# Patient Record
Sex: MALE | Race: AMERICAN INDIAN OR ALASKA NATIVE | NOT HISPANIC OR LATINO | Employment: UNEMPLOYED | URBAN - METROPOLITAN AREA
[De-identification: names, ages, dates, MRNs, and addresses within clinical notes are randomized per-mention and may not be internally consistent; named-entity substitution may affect disease eponyms.]

---

## 2017-06-22 ENCOUNTER — HOSPITAL ENCOUNTER (EMERGENCY)
Facility: CLINIC | Age: 52
Discharge: HOME | End: 2017-06-22
Attending: EMERGENCY MEDICINE | Admitting: EMERGENCY MEDICINE
Payer: COMMERCIAL

## 2017-06-22 VITALS
HEART RATE: 72 BPM | WEIGHT: 156 LBS | RESPIRATION RATE: 16 BRPM | DIASTOLIC BLOOD PRESSURE: 82 MMHG | BODY MASS INDEX: 23.64 KG/M2 | SYSTOLIC BLOOD PRESSURE: 132 MMHG | OXYGEN SATURATION: 97 % | TEMPERATURE: 98.2 F | HEIGHT: 68 IN

## 2017-06-22 DIAGNOSIS — F10.930 UNCOMPLICATED ALCOHOL WITHDRAWAL (H): ICD-10-CM

## 2017-06-22 DIAGNOSIS — R11.2 NAUSEA AND VOMITING, INTRACTABILITY OF VOMITING NOT SPECIFIED, UNSPECIFIED VOMITING TYPE: ICD-10-CM

## 2017-06-22 DIAGNOSIS — E87.6 HYPOKALEMIA: ICD-10-CM

## 2017-06-22 LAB
ALBUMIN SERPL-MCNC: 4 G/DL (ref 3.4–5)
ALCOHOL BREATH TEST: 0 (ref 0–0.01)
ALP SERPL-CCNC: 199 U/L (ref 40–150)
ALT SERPL W P-5'-P-CCNC: 108 U/L (ref 0–70)
ANION GAP SERPL CALCULATED.3IONS-SCNC: 11 MMOL/L (ref 3–14)
ANION GAP SERPL CALCULATED.3IONS-SCNC: 18 MMOL/L (ref 3–14)
AST SERPL W P-5'-P-CCNC: 214 U/L (ref 0–45)
BASOPHILS # BLD AUTO: 0.1 10E9/L (ref 0–0.2)
BASOPHILS NFR BLD AUTO: 1 %
BILIRUB SERPL-MCNC: 4.6 MG/DL (ref 0.2–1.3)
BUN SERPL-MCNC: 13 MG/DL (ref 7–30)
BUN SERPL-MCNC: 14 MG/DL (ref 7–30)
CALCIUM SERPL-MCNC: 8 MG/DL (ref 8.5–10.1)
CALCIUM SERPL-MCNC: 9 MG/DL (ref 8.5–10.1)
CHLORIDE SERPL-SCNC: 102 MMOL/L (ref 94–109)
CHLORIDE SERPL-SCNC: 96 MMOL/L (ref 94–109)
CO2 SERPL-SCNC: 26 MMOL/L (ref 20–32)
CO2 SERPL-SCNC: 29 MMOL/L (ref 20–32)
CREAT SERPL-MCNC: 0.6 MG/DL (ref 0.66–1.25)
CREAT SERPL-MCNC: 0.7 MG/DL (ref 0.66–1.25)
DIFFERENTIAL METHOD BLD: ABNORMAL
EOSINOPHIL # BLD AUTO: 0 10E9/L (ref 0–0.7)
EOSINOPHIL NFR BLD AUTO: 0.4 %
ERYTHROCYTE [DISTWIDTH] IN BLOOD BY AUTOMATED COUNT: 15.5 % (ref 10–15)
GFR SERPL CREATININE-BSD FRML MDRD: >90 ML/MIN/1.7M2
GFR SERPL CREATININE-BSD FRML MDRD: ABNORMAL ML/MIN/1.7M2
GLUCOSE SERPL-MCNC: 104 MG/DL (ref 70–99)
GLUCOSE SERPL-MCNC: 135 MG/DL (ref 70–99)
HCT VFR BLD AUTO: 43 % (ref 40–53)
HGB BLD-MCNC: 15.3 G/DL (ref 13.3–17.7)
IMM GRANULOCYTES # BLD: 0 10E9/L (ref 0–0.4)
IMM GRANULOCYTES NFR BLD: 0.2 %
LIPASE SERPL-CCNC: 315 U/L (ref 73–393)
LYMPHOCYTES # BLD AUTO: 0.6 10E9/L (ref 0.8–5.3)
LYMPHOCYTES NFR BLD AUTO: 11.1 %
MCH RBC QN AUTO: 32.6 PG (ref 26.5–33)
MCHC RBC AUTO-ENTMCNC: 35.6 G/DL (ref 31.5–36.5)
MCV RBC AUTO: 92 FL (ref 78–100)
MONOCYTES # BLD AUTO: 0.7 10E9/L (ref 0–1.3)
MONOCYTES NFR BLD AUTO: 14 %
NEUTROPHILS # BLD AUTO: 3.8 10E9/L (ref 1.6–8.3)
NEUTROPHILS NFR BLD AUTO: 73.3 %
NRBC # BLD AUTO: 0 10*3/UL
NRBC BLD AUTO-RTO: 0 /100
PLATELET # BLD AUTO: 102 10E9/L (ref 150–450)
POTASSIUM SERPL-SCNC: 2.6 MMOL/L (ref 3.4–5.3)
POTASSIUM SERPL-SCNC: 3.2 MMOL/L (ref 3.4–5.3)
PROT SERPL-MCNC: 9.1 G/DL (ref 6.8–8.8)
RBC # BLD AUTO: 4.69 10E12/L (ref 4.4–5.9)
SODIUM SERPL-SCNC: 140 MMOL/L (ref 133–144)
SODIUM SERPL-SCNC: 142 MMOL/L (ref 133–144)
WBC # BLD AUTO: 5.2 10E9/L (ref 4–11)

## 2017-06-22 PROCEDURE — 25000128 H RX IP 250 OP 636: Performed by: EMERGENCY MEDICINE

## 2017-06-22 PROCEDURE — 80048 BASIC METABOLIC PNL TOTAL CA: CPT | Performed by: EMERGENCY MEDICINE

## 2017-06-22 PROCEDURE — 36415 COLL VENOUS BLD VENIPUNCTURE: CPT | Performed by: EMERGENCY MEDICINE

## 2017-06-22 PROCEDURE — 96375 TX/PRO/DX INJ NEW DRUG ADDON: CPT | Performed by: EMERGENCY MEDICINE

## 2017-06-22 PROCEDURE — 96361 HYDRATE IV INFUSION ADD-ON: CPT | Performed by: EMERGENCY MEDICINE

## 2017-06-22 PROCEDURE — 80053 COMPREHEN METABOLIC PANEL: CPT | Performed by: EMERGENCY MEDICINE

## 2017-06-22 PROCEDURE — 82075 ASSAY OF BREATH ETHANOL: CPT | Performed by: EMERGENCY MEDICINE

## 2017-06-22 PROCEDURE — 25000132 ZZH RX MED GY IP 250 OP 250 PS 637: Performed by: EMERGENCY MEDICINE

## 2017-06-22 PROCEDURE — 99284 EMERGENCY DEPT VISIT MOD MDM: CPT | Mod: Z6 | Performed by: EMERGENCY MEDICINE

## 2017-06-22 PROCEDURE — 99284 EMERGENCY DEPT VISIT MOD MDM: CPT | Mod: 25 | Performed by: EMERGENCY MEDICINE

## 2017-06-22 PROCEDURE — 85025 COMPLETE CBC W/AUTO DIFF WBC: CPT | Performed by: EMERGENCY MEDICINE

## 2017-06-22 PROCEDURE — 83690 ASSAY OF LIPASE: CPT | Performed by: EMERGENCY MEDICINE

## 2017-06-22 PROCEDURE — 96365 THER/PROPH/DIAG IV INF INIT: CPT | Performed by: EMERGENCY MEDICINE

## 2017-06-22 RX ORDER — POTASSIUM CHLORIDE 7.45 MG/ML
10 INJECTION INTRAVENOUS CONTINUOUS
Status: DISCONTINUED | OUTPATIENT
Start: 2017-06-22 | End: 2017-06-22 | Stop reason: HOSPADM

## 2017-06-22 RX ORDER — DIAZEPAM 10 MG
10 TABLET ORAL ONCE
Status: COMPLETED | OUTPATIENT
Start: 2017-06-22 | End: 2017-06-22

## 2017-06-22 RX ORDER — POTASSIUM CHLORIDE 1.5 G/1.58G
40 POWDER, FOR SOLUTION ORAL ONCE
Status: COMPLETED | OUTPATIENT
Start: 2017-06-22 | End: 2017-06-22

## 2017-06-22 RX ORDER — ONDANSETRON 2 MG/ML
8 INJECTION INTRAMUSCULAR; INTRAVENOUS ONCE
Status: COMPLETED | OUTPATIENT
Start: 2017-06-22 | End: 2017-06-22

## 2017-06-22 RX ADMIN — DIAZEPAM 10 MG: 10 TABLET ORAL at 12:52

## 2017-06-22 RX ADMIN — SODIUM CHLORIDE 1000 ML: 9 INJECTION, SOLUTION INTRAVENOUS at 09:17

## 2017-06-22 RX ADMIN — POTASSIUM CHLORIDE 40 MEQ: 1.5 POWDER, FOR SOLUTION ORAL at 10:20

## 2017-06-22 RX ADMIN — ONDANSETRON 8 MG: 2 INJECTION INTRAMUSCULAR; INTRAVENOUS at 09:19

## 2017-06-22 RX ADMIN — SODIUM CHLORIDE 1000 ML: 9 INJECTION, SOLUTION INTRAVENOUS at 10:22

## 2017-06-22 RX ADMIN — DIAZEPAM 10 MG: 10 TABLET ORAL at 09:59

## 2017-06-22 RX ADMIN — POTASSIUM CHLORIDE 10 MEQ: 7.46 INJECTION, SOLUTION INTRAVENOUS at 10:33

## 2017-06-22 ASSESSMENT — ENCOUNTER SYMPTOMS
RESPIRATORY NEGATIVE: 1
FEVER: 0
NAUSEA: 1

## 2017-06-22 NOTE — ED AVS SNAPSHOT
[facility], Emergency Department    [address]    [place] [place] [place] [address]    Phone:  [phone]    Fax:  [phone]                                       Mario Lamar II   MRN: [id number]    Department:  [facility], Emergency Department   Date of Visit:  6/22/2017           Patient Information     Date Of Birth          1965        Your diagnoses for this visit were:     Nausea and vomiting, intractability of vomiting not specified, unspecified vomiting type     Hypokalemia        You were seen by Destiney Clement MD.      24 Hour Appointment Hotline       To make an appointment at any [facility] clinic, call [phone] ([phone]). If you don't have a family doctor or clinic, we will help you find one. [facility] clinics are conveniently located to serve the needs of you and your family.             Review of your medicines      Our records show that you are taking the medicines listed below. If these are incorrect, please call your family doctor or clinic.        Dose / Directions Last dose taken    clobetasol 0.05 % ointment   Commonly known as:  TEMOVATE   Quantity:  15 g        Apply topically 2 times daily as needed (Applied to patches on elbows and knees)   Refills:  0        FLUoxetine 20 MG capsule   Commonly known as:  PROzac   Dose:  20 mg   Quantity:  30 capsule        Take 1 capsule (20 mg) by mouth daily   Refills:  0        mirtazapine 7.5 MG Tabs tablet   Commonly known as:  REMERON   Dose:  7.5 mg   Quantity:  30 tablet        Take 1 tablet (7.5 mg) by mouth At Bedtime   Refills:  0                Procedures and tests performed during your visit     Alcohol breath test POCT    Basic metabolic panel    CBC with platelets differential    Comprehensive metabolic panel    Lipase      Orders Needing Specimen Collection     None      Pending Results     No orders found from 6/20/2017 to 6/23/2017.            Pending Culture Results     No orders found from  "2017 to 2017.            Pending Results Instructions     If you had any lab results that were not finalized at the time of your Discharge, you can call the ED Lab Result RN at [phone]. You will be contacted by this team for any positive Lab results or changes in treatment. The nurses are available 7 days a week from 10A to 6:30P.  You can leave a message 24 hours per day and they will return your call.        Thank you for choosing [facility]       Thank you for choosing [facility] for your care. Our goal is always to provide you with excellent care. Hearing back from our patients is one way we can continue to improve our services. Please take a few minutes to complete the written survey that you may receive in the mail after you visit with us. Thank you!        [facility] Information     [facility] lets you send messages to your doctor, view your test results, renew your prescriptions, schedule appointments and more. To sign up, go to [email or web address] . Click on \"Log in\" on the left side of the screen, which will take you to the Welcome page. Then click on \"Sign up Now\" on the right side of the page.     You will be asked to enter the access code listed below, as well as some personal information. Please follow the directions to create your username and password.     Your access code is: [email or web address]  Expires: 2017 12:48 PM     Your access code will  in 90 days. If you need help or a new code, please call your [facility] clinic or [phone].        Care EveryWhere ID     This is your Care EveryWhere ID. This could be used by other organizations to access your [facility] medical records  [id number]        Equal Access to Services     DEANN ROSENBAUM : Geri Bender, marko galvan, quentin hensley. So [facility] [phone].    ATENCIÓN: Si habla español, tiene a lo disposición servicios gratuitos de asistencia lingüística. Llame " al [phone].    We comply with applicable federal civil rights laws and [place] laws. We do not discriminate on the basis of race, color, national origin, age, disability sex, sexual orientation or gender identity.            After Visit Summary       This is your record. Keep this with you and show to your community pharmacist(s) and doctor(s) at your next visit.

## 2017-06-22 NOTE — ED NOTES
Patient placed on EKG monitor, pulse oximeter and blood pressure cuff for K of 2.6.  MD notified of low K+

## 2017-06-22 NOTE — ED PROVIDER NOTES
"  History     Chief Complaint   Patient presents with     Alcohol Problem     Drinks a quart to half gallon of vodka. Also drinks one mouthwash on Sunday. Last drink was at about 2200. Looking for detox. Dry heaving since yesterday and complains of pain in right lateral rib area and right upper quadrant.      HPI  Mario Lamar II is a 52-year-old male with a history of alcohol use who is here with concern for alcohol withdrawal, nausea, vomiting.  The patient states that he drinks regularly and has been doing so for many months now.  He describes for the last 2 days symptoms of epigastric discomfort nausea and vomiting.  He points to his liver and says that he has  liver pain .  He has had no fevers.  He denies any blood in his vomit. He is having symptoms of alcohol withdrawal including shakiness.  He denies a history of seizures.  No other recent illness.  No trauma.  No other complaints at this time.  This part of the document was transcribed by Silvio Wheat Medical Scribe.       I have reviewed the Medications, Allergies, Past Medical and Surgical History, and Social History in the Epic system.    Review of Systems   Constitutional: Negative for fever.   Respiratory: Negative.    Gastrointestinal: Positive for nausea.   All other systems reviewed and are negative.      Physical Exam   BP: (!) 130/93  Heart Rate: 91  Temp: 98.4  F (36.9  C)  Resp: 16  Height: 172.7 cm (5' 8\")  Weight: 70.8 kg (156 lb)  SpO2: 98 %  Physical Exam   Constitutional: He is oriented to person, place, and time. He appears well-developed and well-nourished.   HENT:   Head: Atraumatic.   Right Ear: External ear normal.   Left Ear: External ear normal.   Eyes: Conjunctivae and EOM are normal. No scleral icterus.   Neck: Normal range of motion. Neck supple.   Cardiovascular: Normal rate and regular rhythm.    Pulmonary/Chest: Effort normal. No respiratory distress.   Abdominal: Soft. There is no tenderness. There is no " rebound and no guarding.   Musculoskeletal: Normal range of motion. He exhibits no edema or tenderness.   Neurological: He is alert and oriented to person, place, and time.   Skin: Skin is warm and dry. No rash noted.   Psychiatric: He has a normal mood and affect. His behavior is normal.   Nursing note and vitals reviewed.      ED Course     ED Course     Procedures      Labs Ordered and Resulted from Time of ED Arrival Up to the Time of Departure from the ED   COMPREHENSIVE METABOLIC PANEL - Abnormal; Notable for the following:        Result Value    Potassium 2.6 (*)     Anion Gap 18 (*)     Glucose 104 (*)     Bilirubin Total 4.6 (*)     Protein Total 9.1 (*)     Alkaline Phosphatase 199 (*)      (*)      (*)     All other components within normal limits   CBC WITH PLATELETS DIFFERENTIAL - Abnormal; Notable for the following:     RDW 15.5 (*)     Platelet Count 102 (*)     Absolute Lymphocytes 0.6 (*)     All other components within normal limits   BASIC METABOLIC PANEL - Abnormal; Notable for the following:     Potassium 3.2 (*)     Glucose 135 (*)     Creatinine 0.60 (*)     Calcium 8.0 (*)     All other components within normal limits   ALCOHOL BREATH TEST POCT - Normal   LIPASE       Assessments & Plan (with Medical Decision Making)   52-year-old male here with nausea vomiting and alcohol withdrawal.  In the Emergency Department he is not tachycardic not hypertensive.  He does seem slightly tremulous though.  His belly exam is extremely benign with no tenderness elicited on palpation.  He was given symptomatic control with IV fluids, Zofran as well as a dose of oral Valium.  His labs were mostly notable for a chem panel with an elevated anion gap and a potassium of 2.6.  After the interventions previously described we rechecked the potassium and it is  now normal at 3.3.  The rest of his labs are unremarkable with exception of a mildly elevated ALT AST and bilirubin, suspect alcoholic  hepatitis.  He is feeling much improved against so I think it is probably reasonable for him to be transitioned over to detox.  I offered the patient detox at [address] but the patient declined.  He wanted to be admitted to [facility] but there is no chance for any bed today.  Again he declined detox and wants to go home instead which I certainly cannot force him to stay.  He can come back to the emergency room with any concern.  This part of the document was transcribed by Silvio Wheat, Medical Scribe.       I have reviewed the nursing notes.    I have reviewed the findings, diagnosis, plan and need for follow up with the patient.    Discharge Medication List as of 6/22/2017 12:48 PM          Final diagnoses:   Nausea and vomiting, intractability of vomiting not specified, unspecified vomiting type   Hypokalemia       6/22/2017   [facility], [place], EMERGENCY DEPARTMENT     Destiney Clement MD  06/22/17 6693

## 2017-06-22 NOTE — ED AVS SNAPSHOT
[facility], Emergency Department    [address]    [place] [place] [address]    Phone:  [phone]    Fax:  [phone]                                       Mario Lamar II   MRN: [id number]    Department:  [facility], Emergency Department   Date of Visit:  6/22/2017           After Visit Summary Signature Page     I have received my discharge instructions, and my questions have been answered. I have discussed any challenges I see with this plan with the nurse or doctor.    ..........................................................................................................................................  Patient/Patient Representative Signature      ..........................................................................................................................................  Patient Representative Print Name and Relationship to Patient    ..................................................               ................................................  Date                                            Time    ..........................................................................................................................................  Reviewed by Signature/Title    ...................................................              ..............................................  Date                                                            Time

## 2017-06-22 NOTE — DISCHARGE INSTRUCTIONS
Please make an appointment to [facility] with [facility] (phone: [phone] as soon as possible.

## 2017-07-14 ENCOUNTER — HOSPITAL ENCOUNTER (EMERGENCY)
Facility: CLINIC | Age: 52
Discharge: HOME | End: 2017-07-14
Attending: EMERGENCY MEDICINE | Admitting: EMERGENCY MEDICINE
Payer: COMMERCIAL

## 2017-07-14 VITALS
TEMPERATURE: 98 F | WEIGHT: 154 LBS | RESPIRATION RATE: 18 BRPM | DIASTOLIC BLOOD PRESSURE: 75 MMHG | SYSTOLIC BLOOD PRESSURE: 120 MMHG | HEIGHT: 68 IN | BODY MASS INDEX: 23.34 KG/M2 | OXYGEN SATURATION: 96 %

## 2017-07-14 DIAGNOSIS — F10.10 ALCOHOL ABUSE: ICD-10-CM

## 2017-07-14 DIAGNOSIS — F10.20 ACUTE ALCOHOLISM (H): ICD-10-CM

## 2017-07-14 LAB — ALCOHOL BREATH TEST: 0 (ref 0–0.01)

## 2017-07-14 PROCEDURE — 82075 ASSAY OF BREATH ETHANOL: CPT | Performed by: EMERGENCY MEDICINE

## 2017-07-14 PROCEDURE — 25000132 ZZH RX MED GY IP 250 OP 250 PS 637: Performed by: EMERGENCY MEDICINE

## 2017-07-14 PROCEDURE — 99283 EMERGENCY DEPT VISIT LOW MDM: CPT | Performed by: EMERGENCY MEDICINE

## 2017-07-14 PROCEDURE — 99282 EMERGENCY DEPT VISIT SF MDM: CPT | Mod: Z6 | Performed by: EMERGENCY MEDICINE

## 2017-07-14 RX ORDER — DIAZEPAM 5 MG
5 TABLET ORAL ONCE
Status: COMPLETED | OUTPATIENT
Start: 2017-07-14 | End: 2017-07-14

## 2017-07-14 RX ADMIN — DIAZEPAM 5 MG: 5 TABLET ORAL at 13:56

## 2017-07-14 ASSESSMENT — ENCOUNTER SYMPTOMS
NAUSEA: 1
ARTHRALGIAS: 0
HEADACHES: 0
COLOR CHANGE: 0
CHILLS: 1
CONFUSION: 0
NECK STIFFNESS: 0
DIAPHORESIS: 1
DIFFICULTY URINATING: 0
SHORTNESS OF BREATH: 0
FEVER: 0
EYE REDNESS: 0
ABDOMINAL PAIN: 0

## 2017-07-14 NOTE — ED AVS SNAPSHOT
[facility], Emergency Department    [address]    [place] [address]    Phone:  [phone]    Fax:  [phone]                                       Mario Lamar II   MRN: [id number]    Department:  [facility], Emergency Department   Date of Visit:  7/14/2017           Patient Information     Date Of Birth          1965        Your diagnoses for this visit were:     Alcohol abuse        You were seen by Gilberto Treviño MD.      Follow-up Information     Follow up with Elle Aguilar MD.    Specialty:  Family Practice    Contact information:     [facility]  [address] [facility] [address]  [phone]        24 Hour Appointment Hotline       To make an appointment at any [facility] clinic, call [phone] ([phone]). If you don't have a family doctor or clinic, we will help you find one. [facility] clinics are conveniently located to serve the needs of you and your family.             Review of your medicines      Our records show that you are taking the medicines listed below. If these are incorrect, please call your family doctor or clinic.        Dose / Directions Last dose taken    clobetasol 0.05 % ointment   Commonly known as:  TEMOVATE   Quantity:  15 g        Apply topically 2 times daily as needed (Applied to patches on elbows and knees)   Refills:  0        FLUoxetine 20 MG capsule   Commonly known as:  PROzac   Dose:  20 mg   Quantity:  30 capsule        Take 1 capsule (20 mg) by mouth daily   Refills:  0        mirtazapine 7.5 MG Tabs tablet   Commonly known as:  REMERON   Dose:  7.5 mg   Quantity:  30 tablet        Take 1 tablet (7.5 mg) by mouth At Bedtime   Refills:  0                Procedures and tests performed during your visit     Alcohol breath test POCT      Orders Needing Specimen Collection     None      Pending Results     No orders found from 7/12/2017 to 7/15/2017.            Pending Culture Results     No orders found  "from 2017 to 7/15/2017.            Pending Results Instructions     If you had any lab results that were not finalized at the time of your Discharge, you can call the ED Lab Result RN at [phone]. You will be contacted by this team for any positive Lab results or changes in treatment. The nurses are available 7 days a week from 10A to 6:30P.  You can leave a message 24 hours per day and they will return your call.        Thank you for choosing [facility]       Thank you for choosing [facility] for your care. Our goal is always to provide you with excellent care. Hearing back from our patients is one way we can continue to improve our services. Please take a few minutes to complete the written survey that you may receive in the mail after you visit with us. Thank you!        [employer] Information     [employer] lets you send messages to your doctor, view your test results, renew your prescriptions, schedule appointments and more. To sign up, go to [email or web address] . Click on \"Log in\" on the left side of the screen, which will take you to the Welcome page. Then click on \"Sign up Now\" on the right side of the page.     You will be asked to enter the access code listed below, as well as some personal information. Please follow the directions to create your username and password.     Your access code is: [email or web address]  Expires: 2017 12:48 PM     Your access code will  in 90 days. If you need help or a new code, please call your [facility] clinic or [phone].        Care EveryWhere ID     This is your Care EveryWhere ID. This could be used by other organizations to access your [facility] medical records  [id number]        Equal Access to Services     DEANN ROSENBAUM : Hadii lili Bender, marko aglvan, quentin hensley. So [facility] [phone].    ATENCIÓN: Si habla español, tiene a lo disposición servicios gratuitos de asistencia lingüística. " Alayna lang [phone].    We comply with applicable federal civil rights laws and [place] laws. We do not discriminate on the basis of race, color, national origin, age, disability sex, sexual orientation or gender identity.            After Visit Summary       This is your record. Keep this with you and show to your community pharmacist(s) and doctor(s) at your next visit.

## 2017-07-14 NOTE — ED PROVIDER NOTES
"  History     Chief Complaint   Patient presents with     Alcohol Problem     Pt has been drinking 1L vodka per day with last drink at approx midnight. Pt states he has had seizures with withdrawal. Pt believes last one was approx 3mths ago.     HPI  Mario Lamar II is a 52 year old male with a history of substance abuse who presents to the Emergency Department for evaluation of an alcohol problem. Patient complains of withdrawal symptoms beginning last night; including shakiness, chills, diaphoresis, nausea, and seeing \"lots of little things\" when he closes his eyes. He has been drinking everyday since the death of his wife in 2015. He drinks vodka. His last drink was yesterday evening around midnight, as far as he remembers since he notes that he \"blacked out\". He is seeking detox. He has been to detox before at [address]. He denies other drug use. He notes that his hand \"is all messed up\" and that he has screws and plates in his ankle. Patient is homeless, but occasionally stays with his sister.    Past Medical History:   Diagnosis Date     Substance abuse     alcohol abuse       Past Surgical History:   Procedure Laterality Date     ORTHOPEDIC SURGERY      Right ankle orthopedic surgery after fracture.       No family history on file.    Social History   Substance Use Topics     Smoking status: Current Every Day Smoker     Packs/day: 0.25     Smokeless tobacco: Former User     Alcohol use Yes      Comment: every day, 1 quart and a half/day of vodka       No current facility-administered medications for this encounter.      Current Outpatient Prescriptions   Medication     FLUoxetine (PROZAC) 20 MG capsule     mirtazapine (REMERON) 7.5 MG TABS tablet     clobetasol (TEMOVATE) 0.05 % ointment      No Known Allergies      I have reviewed the Medications, Allergies, Past Medical and Surgical History, and Social History in the Epic system.    Review of Systems   Constitutional: Positive for chills and " "diaphoresis. Negative for fever.   HENT: Negative for congestion.    Eyes: Positive for visual disturbance. Negative for redness.   Respiratory: Negative for shortness of breath.    Cardiovascular: Negative for chest pain.   Gastrointestinal: Positive for nausea. Negative for abdominal pain.   Genitourinary: Negative for difficulty urinating.   Musculoskeletal: Negative for arthralgias and neck stiffness.   Skin: Negative for color change.   Neurological: Negative for headaches.   Psychiatric/Behavioral: Negative for confusion.   All other systems reviewed and are negative.      Physical Exam   BP: 125/84  Heart Rate: 88  Temp: 98.3  F (36.8  C)  Resp: 18  Height: 172.7 cm (5' 8\")  Weight: 69.9 kg (154 lb)  SpO2: 95 %  Physical Exam   Constitutional: No distress.   HENT:   Head: Atraumatic.   Mouth/Throat: Oropharynx is clear and moist. No oropharyngeal exudate.   Eyes: Pupils are equal, round, and reactive to light. No scleral icterus.   Cardiovascular: Normal heart sounds and intact distal pulses.    Pulmonary/Chest: Breath sounds normal. No respiratory distress.   Abdominal: Soft. Bowel sounds are normal. There is no tenderness.   Musculoskeletal: He exhibits no edema or tenderness.   Neurological: He displays tremor.   Skin: Skin is warm. No rash noted. He is not diaphoretic.       ED Course   1:15 PM  The patient was seen and examined by Kevin Treviño MD in Room 7.     ED Course     Procedures               Labs Ordered and Resulted from Time of ED Arrival Up to the Time of Departure from the ED   ALCOHOL BREATH TEST POCT - Normal            Assessments & Plan (with Medical Decision Making)   52-year-old male with long-standing history of alcohol use and abuse who presents requesting detox.  He states that he is drinking 1 L of vodka per day.  However, his exam is largely unremarkable with normal vital signs and a mild tremor of his upper extremities, stretched hands.  Alcohol level was 0.  He had been provided " with 5 mg of Valium.  Unfortunately there were no detox beds available.  The patient will be discharged with instructions to follow-up with his primary physician.  In addition, he was given a list of available detox centers in the [facility].    I have reviewed the nursing notes.    I have reviewed the findings, diagnosis, plan and need for follow up with the patient.    New Prescriptions    No medications on file       Final diagnoses:   Alcohol abuse   IYuridia, am serving as a trained medical scribe to document services personally performed by Kevin Treviño MD, based on the provider's statements to me.      IKevin MD, was physically present and have reviewed and verified the accuracy of this note documented by Yuridia Espinoza.       7/14/2017   [facility], [place], EMERGENCY DEPARTMENT     Gilberto Treviño MD  07/14/17 4198

## 2017-07-14 NOTE — ED AVS SNAPSHOT
[facility], Emergency Department    [address]    [place] [place] [address]    Phone:  [phone]    Fax:  [phone]                                       Mario Lamar II   MRN: [id number]    Department:  [facility], Emergency Department   Date of Visit:  7/14/2017           After Visit Summary Signature Page     I have received my discharge instructions, and my questions have been answered. I have discussed any challenges I see with this plan with the nurse or doctor.    ..........................................................................................................................................  Patient/Patient Representative Signature      ..........................................................................................................................................  Patient Representative Print Name and Relationship to Patient    ..................................................               ................................................  Date                                            Time    ..........................................................................................................................................  Reviewed by Signature/Title    ...................................................              ..............................................  Date                                                            Time

## 2017-11-26 ENCOUNTER — HOSPITAL ENCOUNTER (INPATIENT)
Facility: CLINIC | Age: 52
LOS: 5 days | Discharge: HOME | DRG: 897 | End: 2017-12-01
Attending: EMERGENCY MEDICINE | Admitting: INTERNAL MEDICINE
Payer: COMMERCIAL

## 2017-11-26 DIAGNOSIS — E87.6 HYPOKALEMIA: ICD-10-CM

## 2017-11-26 DIAGNOSIS — F10.939 ALCOHOL WITHDRAWAL SYNDROME WITH COMPLICATION (H): ICD-10-CM

## 2017-11-26 DIAGNOSIS — F10.930 ALCOHOL WITHDRAWAL SYNDROME WITHOUT COMPLICATION (H): ICD-10-CM

## 2017-11-26 DIAGNOSIS — F32.A DEPRESSION WITH SUICIDAL IDEATION: Primary | ICD-10-CM

## 2017-11-26 DIAGNOSIS — E83.42 HYPOMAGNESEMIA: ICD-10-CM

## 2017-11-26 DIAGNOSIS — R45.851 DEPRESSION WITH SUICIDAL IDEATION: Primary | ICD-10-CM

## 2017-11-26 LAB
ABO + RH BLD: NORMAL
ABO + RH BLD: NORMAL
ALBUMIN SERPL-MCNC: 3.7 G/DL (ref 3.4–5)
ALBUMIN UR-MCNC: 30 MG/DL
ALP SERPL-CCNC: 167 U/L (ref 40–150)
ALT SERPL W P-5'-P-CCNC: 123 U/L (ref 0–70)
AMPHETAMINES UR QL SCN: NEGATIVE
ANION GAP SERPL CALCULATED.3IONS-SCNC: 20 MMOL/L (ref 3–14)
APPEARANCE UR: CLEAR
AST SERPL W P-5'-P-CCNC: 196 U/L (ref 0–45)
BACTERIA #/AREA URNS HPF: ABNORMAL /HPF
BARBITURATES UR QL: NEGATIVE
BASOPHILS # BLD AUTO: 0.1 10E9/L (ref 0–0.2)
BASOPHILS NFR BLD AUTO: 1.5 %
BENZODIAZ UR QL: POSITIVE
BILIRUB SERPL-MCNC: 2.5 MG/DL (ref 0.2–1.3)
BILIRUB UR QL STRIP: ABNORMAL
BLD GP AB SCN SERPL QL: NORMAL
BLOOD BANK CMNT PATIENT-IMP: NORMAL
BUN SERPL-MCNC: 13 MG/DL (ref 7–30)
CALCIUM SERPL-MCNC: 8.5 MG/DL (ref 8.5–10.1)
CANNABINOIDS UR QL SCN: NEGATIVE
CHLORIDE SERPL-SCNC: 99 MMOL/L (ref 94–109)
CO2 SERPL-SCNC: 20 MMOL/L (ref 20–32)
COCAINE UR QL: NEGATIVE
COLOR UR AUTO: ABNORMAL
CREAT SERPL-MCNC: 0.56 MG/DL (ref 0.66–1.25)
DIFFERENTIAL METHOD BLD: ABNORMAL
EOSINOPHIL # BLD AUTO: 0 10E9/L (ref 0–0.7)
EOSINOPHIL NFR BLD AUTO: 0.2 %
ERYTHROCYTE [DISTWIDTH] IN BLOOD BY AUTOMATED COUNT: 15.1 % (ref 10–15)
ETHANOL SERPL-MCNC: 0.01 G/DL
ETHANOL UR QL SCN: NEGATIVE
GFR SERPL CREATININE-BSD FRML MDRD: >90 ML/MIN/1.7M2
GLUCOSE SERPL-MCNC: 71 MG/DL (ref 70–99)
GLUCOSE UR STRIP-MCNC: 30 MG/DL
HCT VFR BLD AUTO: 40.3 % (ref 40–53)
HGB BLD-MCNC: 14 G/DL (ref 13.3–17.7)
HGB UR QL STRIP: ABNORMAL
IMM GRANULOCYTES # BLD: 0 10E9/L (ref 0–0.4)
IMM GRANULOCYTES NFR BLD: 0 %
INR PPP: 1.08 (ref 0.86–1.14)
KETONES UR STRIP-MCNC: 40 MG/DL
LEUKOCYTE ESTERASE UR QL STRIP: ABNORMAL
LIPASE SERPL-CCNC: 399 U/L (ref 73–393)
LYMPHOCYTES # BLD AUTO: 1 10E9/L (ref 0.8–5.3)
LYMPHOCYTES NFR BLD AUTO: 17.4 %
MAGNESIUM SERPL-MCNC: 1.2 MG/DL (ref 1.6–2.3)
MAGNESIUM SERPL-MCNC: 2.1 MG/DL (ref 1.6–2.3)
MCH RBC QN AUTO: 31.5 PG (ref 26.5–33)
MCHC RBC AUTO-ENTMCNC: 34.7 G/DL (ref 31.5–36.5)
MCV RBC AUTO: 91 FL (ref 78–100)
MONOCYTES # BLD AUTO: 0.7 10E9/L (ref 0–1.3)
MONOCYTES NFR BLD AUTO: 11.2 %
MUCOUS THREADS #/AREA URNS LPF: PRESENT /LPF
NEUTROPHILS # BLD AUTO: 4.2 10E9/L (ref 1.6–8.3)
NEUTROPHILS NFR BLD AUTO: 69.7 %
NITRATE UR QL: NEGATIVE
NRBC # BLD AUTO: 0 10*3/UL
NRBC BLD AUTO-RTO: 0 /100
OPIATES UR QL SCN: NEGATIVE
PH UR STRIP: 7 PH (ref 5–7)
PHOSPHATE SERPL-MCNC: 2.6 MG/DL (ref 2.5–4.5)
PLATELET # BLD AUTO: 124 10E9/L (ref 150–450)
POTASSIUM SERPL-SCNC: 3.3 MMOL/L (ref 3.4–5.3)
POTASSIUM SERPL-SCNC: 3.4 MMOL/L (ref 3.4–5.3)
PROT SERPL-MCNC: 8.7 G/DL (ref 6.8–8.8)
RBC # BLD AUTO: 4.45 10E12/L (ref 4.4–5.9)
RBC #/AREA URNS AUTO: >182 /HPF (ref 0–2)
SODIUM SERPL-SCNC: 139 MMOL/L (ref 133–144)
SOURCE: ABNORMAL
SP GR UR STRIP: 1.02 (ref 1–1.03)
SPECIMEN EXP DATE BLD: NORMAL
SQUAMOUS #/AREA URNS AUTO: <1 /HPF (ref 0–1)
TROPONIN I BLD-MCNC: 0 UG/L (ref 0–0.1)
TROPONIN I SERPL-MCNC: <0.015 UG/L (ref 0–0.04)
UROBILINOGEN UR STRIP-MCNC: >12 MG/DL (ref 0–2)
WBC # BLD AUTO: 6 10E9/L (ref 4–11)
WBC #/AREA URNS AUTO: 11 /HPF (ref 0–2)

## 2017-11-26 PROCEDURE — 85610 PROTHROMBIN TIME: CPT | Performed by: EMERGENCY MEDICINE

## 2017-11-26 PROCEDURE — 12000001 ZZH R&B MED SURG/OB UMMC

## 2017-11-26 PROCEDURE — 25000128 H RX IP 250 OP 636: Performed by: EMERGENCY MEDICINE

## 2017-11-26 PROCEDURE — 99284 EMERGENCY DEPT VISIT MOD MDM: CPT | Mod: Z6 | Performed by: EMERGENCY MEDICINE

## 2017-11-26 PROCEDURE — 86900 BLOOD TYPING SEROLOGIC ABO: CPT | Performed by: EMERGENCY MEDICINE

## 2017-11-26 PROCEDURE — 83735 ASSAY OF MAGNESIUM: CPT | Performed by: EMERGENCY MEDICINE

## 2017-11-26 PROCEDURE — 84100 ASSAY OF PHOSPHORUS: CPT | Performed by: EMERGENCY MEDICINE

## 2017-11-26 PROCEDURE — 83690 ASSAY OF LIPASE: CPT | Performed by: EMERGENCY MEDICINE

## 2017-11-26 PROCEDURE — 36415 COLL VENOUS BLD VENIPUNCTURE: CPT | Performed by: STUDENT IN AN ORGANIZED HEALTH CARE EDUCATION/TRAINING PROGRAM

## 2017-11-26 PROCEDURE — 86850 RBC ANTIBODY SCREEN: CPT | Performed by: EMERGENCY MEDICINE

## 2017-11-26 PROCEDURE — 86901 BLOOD TYPING SEROLOGIC RH(D): CPT | Performed by: EMERGENCY MEDICINE

## 2017-11-26 PROCEDURE — 83735 ASSAY OF MAGNESIUM: CPT | Performed by: STUDENT IN AN ORGANIZED HEALTH CARE EDUCATION/TRAINING PROGRAM

## 2017-11-26 PROCEDURE — 96375 TX/PRO/DX INJ NEW DRUG ADDON: CPT | Performed by: EMERGENCY MEDICINE

## 2017-11-26 PROCEDURE — 99285 EMERGENCY DEPT VISIT HI MDM: CPT | Mod: 25 | Performed by: EMERGENCY MEDICINE

## 2017-11-26 PROCEDURE — 80320 DRUG SCREEN QUANTALCOHOLS: CPT | Performed by: EMERGENCY MEDICINE

## 2017-11-26 PROCEDURE — 25000128 H RX IP 250 OP 636: Performed by: STUDENT IN AN ORGANIZED HEALTH CARE EDUCATION/TRAINING PROGRAM

## 2017-11-26 PROCEDURE — 96365 THER/PROPH/DIAG IV INF INIT: CPT | Performed by: EMERGENCY MEDICINE

## 2017-11-26 PROCEDURE — 87086 URINE CULTURE/COLONY COUNT: CPT | Performed by: INTERNAL MEDICINE

## 2017-11-26 PROCEDURE — 96376 TX/PRO/DX INJ SAME DRUG ADON: CPT | Performed by: EMERGENCY MEDICINE

## 2017-11-26 PROCEDURE — 96366 THER/PROPH/DIAG IV INF ADDON: CPT | Performed by: EMERGENCY MEDICINE

## 2017-11-26 PROCEDURE — 96367 TX/PROPH/DG ADDL SEQ IV INF: CPT | Performed by: EMERGENCY MEDICINE

## 2017-11-26 PROCEDURE — 80307 DRUG TEST PRSMV CHEM ANLYZR: CPT | Performed by: EMERGENCY MEDICINE

## 2017-11-26 PROCEDURE — 25000125 ZZHC RX 250: Performed by: EMERGENCY MEDICINE

## 2017-11-26 PROCEDURE — 84484 ASSAY OF TROPONIN QUANT: CPT

## 2017-11-26 PROCEDURE — 84484 ASSAY OF TROPONIN QUANT: CPT | Performed by: STUDENT IN AN ORGANIZED HEALTH CARE EDUCATION/TRAINING PROGRAM

## 2017-11-26 PROCEDURE — 84132 ASSAY OF SERUM POTASSIUM: CPT | Performed by: STUDENT IN AN ORGANIZED HEALTH CARE EDUCATION/TRAINING PROGRAM

## 2017-11-26 PROCEDURE — 81001 URINALYSIS AUTO W/SCOPE: CPT | Performed by: EMERGENCY MEDICINE

## 2017-11-26 PROCEDURE — 85025 COMPLETE CBC W/AUTO DIFF WBC: CPT | Performed by: EMERGENCY MEDICINE

## 2017-11-26 PROCEDURE — HZ2ZZZZ DETOXIFICATION SERVICES FOR SUBSTANCE ABUSE TREATMENT: ICD-10-PCS | Performed by: STUDENT IN AN ORGANIZED HEALTH CARE EDUCATION/TRAINING PROGRAM

## 2017-11-26 PROCEDURE — 25000132 ZZH RX MED GY IP 250 OP 250 PS 637: Performed by: EMERGENCY MEDICINE

## 2017-11-26 PROCEDURE — 93005 ELECTROCARDIOGRAM TRACING: CPT | Performed by: EMERGENCY MEDICINE

## 2017-11-26 PROCEDURE — 80053 COMPREHEN METABOLIC PANEL: CPT | Performed by: EMERGENCY MEDICINE

## 2017-11-26 PROCEDURE — 25000132 ZZH RX MED GY IP 250 OP 250 PS 637: Performed by: STUDENT IN AN ORGANIZED HEALTH CARE EDUCATION/TRAINING PROGRAM

## 2017-11-26 PROCEDURE — 96361 HYDRATE IV INFUSION ADD-ON: CPT | Performed by: EMERGENCY MEDICINE

## 2017-11-26 RX ORDER — PRAZOSIN HYDROCHLORIDE 2 MG/1
4 CAPSULE ORAL AT BEDTIME
Status: ON HOLD | COMMUNITY
End: 2017-12-01

## 2017-11-26 RX ORDER — MAGNESIUM SULFATE HEPTAHYDRATE 40 MG/ML
4 INJECTION, SOLUTION INTRAVENOUS EVERY 4 HOURS PRN
Status: DISCONTINUED | OUTPATIENT
Start: 2017-11-26 | End: 2017-12-01 | Stop reason: HOSPADM

## 2017-11-26 RX ORDER — NALOXONE HYDROCHLORIDE 0.4 MG/ML
.1-.4 INJECTION, SOLUTION INTRAMUSCULAR; INTRAVENOUS; SUBCUTANEOUS
Status: DISCONTINUED | OUTPATIENT
Start: 2017-11-26 | End: 2017-12-01 | Stop reason: HOSPADM

## 2017-11-26 RX ORDER — DIAZEPAM 10 MG
10 TABLET ORAL ONCE
Status: DISCONTINUED | OUTPATIENT
Start: 2017-11-26 | End: 2017-11-26

## 2017-11-26 RX ORDER — ONDANSETRON 2 MG/ML
4 INJECTION INTRAMUSCULAR; INTRAVENOUS ONCE
Status: COMPLETED | OUTPATIENT
Start: 2017-11-26 | End: 2017-11-26

## 2017-11-26 RX ORDER — FOLIC ACID 1 MG/1
1 TABLET ORAL DAILY
Status: DISCONTINUED | OUTPATIENT
Start: 2017-11-27 | End: 2017-12-01 | Stop reason: HOSPADM

## 2017-11-26 RX ORDER — POTASSIUM CHLORIDE 1.5 G/1.58G
20-40 POWDER, FOR SOLUTION ORAL
Status: DISCONTINUED | OUTPATIENT
Start: 2017-11-26 | End: 2017-12-01 | Stop reason: HOSPADM

## 2017-11-26 RX ORDER — POTASSIUM CHLORIDE 750 MG/1
20-40 TABLET, EXTENDED RELEASE ORAL
Status: DISCONTINUED | OUTPATIENT
Start: 2017-11-26 | End: 2017-12-01 | Stop reason: HOSPADM

## 2017-11-26 RX ORDER — BETAMETHASONE DIPROPIONATE 0.5 MG/G
OINTMENT, AUGMENTED TOPICAL 2 TIMES DAILY
Status: ON HOLD | COMMUNITY
End: 2017-12-01

## 2017-11-26 RX ORDER — RISPERIDONE 2 MG/1
2 TABLET ORAL AT BEDTIME
Status: ON HOLD | COMMUNITY
End: 2017-12-01

## 2017-11-26 RX ORDER — ONDANSETRON 4 MG/1
4 TABLET, ORALLY DISINTEGRATING ORAL EVERY 6 HOURS PRN
Status: DISCONTINUED | OUTPATIENT
Start: 2017-11-26 | End: 2017-12-01 | Stop reason: HOSPADM

## 2017-11-26 RX ORDER — POTASSIUM CL/LIDO/0.9 % NACL 10MEQ/0.1L
10 INTRAVENOUS SOLUTION, PIGGYBACK (ML) INTRAVENOUS ONCE
Status: COMPLETED | OUTPATIENT
Start: 2017-11-26 | End: 2017-11-26

## 2017-11-26 RX ORDER — HYDROXYZINE HYDROCHLORIDE 25 MG/1
25 TABLET, FILM COATED ORAL 3 TIMES DAILY
Status: ON HOLD | COMMUNITY
End: 2017-12-01

## 2017-11-26 RX ORDER — DIAZEPAM 10 MG/2ML
10 INJECTION, SOLUTION INTRAMUSCULAR; INTRAVENOUS ONCE
Status: COMPLETED | OUTPATIENT
Start: 2017-11-26 | End: 2017-11-26

## 2017-11-26 RX ORDER — POTASSIUM CL/LIDO/0.9 % NACL 10MEQ/0.1L
10 INTRAVENOUS SOLUTION, PIGGYBACK (ML) INTRAVENOUS
Status: DISCONTINUED | OUTPATIENT
Start: 2017-11-26 | End: 2017-12-01 | Stop reason: HOSPADM

## 2017-11-26 RX ORDER — ACETAMINOPHEN 325 MG/1
650 TABLET ORAL 3 TIMES DAILY PRN
COMMUNITY
End: 2018-01-31

## 2017-11-26 RX ORDER — MIRTAZAPINE 15 MG/1
15 TABLET, FILM COATED ORAL AT BEDTIME
Status: ON HOLD | COMMUNITY
End: 2017-12-01

## 2017-11-26 RX ORDER — DIAZEPAM 5 MG
5-20 TABLET ORAL EVERY 30 MIN PRN
Status: DISCONTINUED | OUTPATIENT
Start: 2017-11-26 | End: 2017-12-01 | Stop reason: HOSPADM

## 2017-11-26 RX ORDER — POTASSIUM CHLORIDE 7.45 MG/ML
10 INJECTION INTRAVENOUS
Status: DISCONTINUED | OUTPATIENT
Start: 2017-11-26 | End: 2017-12-01 | Stop reason: HOSPADM

## 2017-11-26 RX ORDER — ONDANSETRON 2 MG/ML
4 INJECTION INTRAMUSCULAR; INTRAVENOUS EVERY 6 HOURS PRN
Status: DISCONTINUED | OUTPATIENT
Start: 2017-11-26 | End: 2017-12-01 | Stop reason: HOSPADM

## 2017-11-26 RX ORDER — DIAZEPAM 10 MG
10 TABLET ORAL ONCE
Status: COMPLETED | OUTPATIENT
Start: 2017-11-26 | End: 2017-11-26

## 2017-11-26 RX ORDER — MULTIPLE VITAMINS W/ MINERALS TAB 9MG-400MCG
1 TAB ORAL DAILY
Status: DISCONTINUED | OUTPATIENT
Start: 2017-11-27 | End: 2017-12-01 | Stop reason: HOSPADM

## 2017-11-26 RX ORDER — POTASSIUM CHLORIDE 14.9 MG/ML
20 INJECTION INTRAVENOUS
Status: DISCONTINUED | OUTPATIENT
Start: 2017-11-26 | End: 2017-12-01 | Stop reason: HOSPADM

## 2017-11-26 RX ORDER — SENNOSIDES 8.6 MG
1 TABLET ORAL 2 TIMES DAILY PRN
Status: ON HOLD | COMMUNITY
End: 2017-12-01

## 2017-11-26 RX ORDER — FLUOXETINE 10 MG/1
30 CAPSULE ORAL DAILY
Status: ON HOLD | COMMUNITY
End: 2017-12-01

## 2017-11-26 RX ORDER — LANOLIN ALCOHOL/MO/W.PET/CERES
100 CREAM (GRAM) TOPICAL DAILY
Status: COMPLETED | OUTPATIENT
Start: 2017-11-27 | End: 2017-11-29

## 2017-11-26 RX ADMIN — POTASSIUM CHLORIDE 10 MEQ: 14.9 INJECTION, SOLUTION, CONCENTRATE PARENTERAL at 14:14

## 2017-11-26 RX ADMIN — ONDANSETRON 4 MG: 2 INJECTION INTRAMUSCULAR; INTRAVENOUS at 16:04

## 2017-11-26 RX ADMIN — SODIUM CHLORIDE 1000 ML: 9 INJECTION, SOLUTION INTRAVENOUS at 11:35

## 2017-11-26 RX ADMIN — DIAZEPAM 10 MG: 10 TABLET ORAL at 16:10

## 2017-11-26 RX ADMIN — ONDANSETRON 4 MG: 2 INJECTION INTRAMUSCULAR; INTRAVENOUS at 11:35

## 2017-11-26 RX ADMIN — DIAZEPAM 10 MG: 5 INJECTION, SOLUTION INTRAMUSCULAR; INTRAVENOUS at 11:35

## 2017-11-26 RX ADMIN — PANTOPRAZOLE SODIUM 40 MG: 40 INJECTION, POWDER, FOR SOLUTION INTRAVENOUS at 12:33

## 2017-11-26 RX ADMIN — SODIUM CHLORIDE 1000 ML: 9 INJECTION, SOLUTION INTRAVENOUS at 19:03

## 2017-11-26 RX ADMIN — POTASSIUM CHLORIDE 20 MEQ: 750 TABLET, FILM COATED, EXTENDED RELEASE ORAL at 19:03

## 2017-11-26 RX ADMIN — FOLIC ACID: 5 INJECTION, SOLUTION INTRAMUSCULAR; INTRAVENOUS; SUBCUTANEOUS at 13:00

## 2017-11-26 RX ADMIN — DIAZEPAM 10 MG: 5 TABLET ORAL at 19:45

## 2017-11-26 ASSESSMENT — ACTIVITIES OF DAILY LIVING (ADL)
TOILETING: 0-->INDEPENDENT
NUMBER_OF_TIMES_PATIENT_HAS_FALLEN_WITHIN_LAST_SIX_MONTHS: 5
RETIRED_EATING: 0-->INDEPENDENT
SWALLOWING: 0-->SWALLOWS FOODS/LIQUIDS WITHOUT DIFFICULTY
COGNITION: 0 - NO COGNITION ISSUES REPORTED
ADLS_ACUITY_SCORE: 13
BATHING: 0-->INDEPENDENT
DRESS: 0-->INDEPENDENT
AMBULATION: 0-->INDEPENDENT
TRANSFERRING: 0-->INDEPENDENT
FALL_HISTORY_WITHIN_LAST_SIX_MONTHS: YES
RETIRED_COMMUNICATION: 0-->UNDERSTANDS/COMMUNICATES WITHOUT DIFFICULTY

## 2017-11-26 ASSESSMENT — ENCOUNTER SYMPTOMS
ARTHRALGIAS: 0
HEADACHES: 0
SHORTNESS OF BREATH: 0
COLOR CHANGE: 0
EYE REDNESS: 0
NECK STIFFNESS: 0
FEVER: 0
DIFFICULTY URINATING: 0
DYSPHORIC MOOD: 1
BLOOD IN STOOL: 0
ABDOMINAL PAIN: 1
VOMITING: 1
NAUSEA: 1
CONFUSION: 0

## 2017-11-26 NOTE — IP AVS SNAPSHOT
[facility]    [address] [facility] [address] [place] [address]    Phone:  [phone]                                       After Visit Summary   11/26/2017    Mario Lamar II    MRN: [id number]           After Visit Summary Signature Page     I have received my discharge instructions, and my questions have been answered. I have discussed any challenges I see with this plan with the nurse or doctor.    ..........................................................................................................................................  Patient/Patient Representative Signature      ..........................................................................................................................................  Patient Representative Print Name and Relationship to Patient    ..................................................               ................................................  Date                                            Time    ..........................................................................................................................................  Reviewed by Signature/Title    ...................................................              ..............................................  Date                                                            Time

## 2017-11-26 NOTE — ED NOTES
RN went into room to check vitals before admission and he was having chest pain (330pm).  Admitting MD was at bedside and ordered EKG and Istat troponin which were normal.  MSSA was 14 and patient was given an additional 10mg of oral valium.  Pt was given 4mg IV Zofran for nausea.  Updated report given to Love ALCAZAR on 10a.

## 2017-11-26 NOTE — ED PROVIDER NOTES
"  History     Chief Complaint   Patient presents with     Withdrawal     shaky, nausea, dizzy, does not want detox     Abdominal Pain     right side abd pain \"since October\"     HPI  Mario Lamar II is a 52 year old male who presents to the emergency department complaining of alcohol withdrawal.  The patient states that he drinks alcohol daily.  He states that as of late, he's been drinking a half a gallon of vodka per day.  His last drink was yesterday evening.  This morning, the patient began feeling tremulous, nauseous, and began experiencing upper abdominal pain.  Patient reports a history of alcohol at all which is in included tremors, alcohol withdrawal seizures, and DTs.  The patient  states that he has been vomiting some yellow liquid this morning as well.  He states he's had upper abdominal pain for the past 1-2 months.  He denies any fever.  He denies any cough or dyspnea.  Patient states he did have one episode of loose stool today.  He denies melena.  He denies hematemesis.  He denies hematochezia.  The patient reports a history of depression.  He denies any suicide ideation.  He has not been compliant with his medications including his anti-depressants and antihypertensives.  He denies any recent fall or injury.  He does express desire to quit drinking.    I have reviewed the Medications, Allergies, Past Medical and Surgical History, and Social History in the Epic system.    Review of Systems   Constitutional: Negative for fever.   HENT: Negative for congestion.    Eyes: Negative for redness.   Respiratory: Negative for shortness of breath.    Cardiovascular: Negative for chest pain.   Gastrointestinal: Positive for abdominal pain, nausea and vomiting. Negative for blood in stool.   Genitourinary: Negative for difficulty urinating.   Musculoskeletal: Negative for arthralgias and neck stiffness.   Skin: Negative for color change.   Neurological: Negative for headaches. "   Psychiatric/Behavioral: Positive for dysphoric mood. Negative for confusion and suicidal ideas.   All other systems reviewed and are negative.      Physical Exam   BP: 141/81  Pulse: 107  Temp: 98  F (36.7  C)  Resp: 18  Weight: 69.6 kg (153 lb 8 oz)  SpO2: 98 %      Physical Exam   Constitutional: He appears well-developed and well-nourished. No distress.   HENT:   Head: Normocephalic and atraumatic.   Mouth/Throat: Oropharynx is clear and moist. No oropharyngeal exudate.   Tongue fasciculations   Eyes: Pupils are equal, round, and reactive to light. No scleral icterus.   Neck: Normal range of motion.   Cardiovascular: Normal heart sounds and intact distal pulses.  Tachycardia present.    Pulmonary/Chest: Effort normal and breath sounds normal. No respiratory distress.   Abdominal: Soft. Bowel sounds are normal. There is no tenderness.   Genitourinary: Rectal exam shows guaiac positive stool (brown stool).   Musculoskeletal: Normal range of motion. He exhibits no edema or tenderness.   Neurological: He is alert. He has normal strength. He displays tremor. Coordination normal.   Skin: Skin is warm. No rash noted. He is not diaphoretic.   Psychiatric: He has a normal mood and affect. His behavior is normal.   Nursing note and vitals reviewed.      ED Course     ED Course     Procedures            Critical Care time:         Results for orders placed or performed during the hospital encounter of 11/26/17 (from the past 24 hour(s))   CBC with platelets differential   Result Value Ref Range    WBC 6.0 4.0 - 11.0 10e9/L    RBC Count 4.45 4.4 - 5.9 10e12/L    Hemoglobin 14.0 13.3 - 17.7 g/dL    Hematocrit 40.3 40.0 - 53.0 %    MCV 91 78 - 100 fl    MCH 31.5 26.5 - 33.0 pg    MCHC 34.7 31.5 - 36.5 g/dL    RDW 15.1 (H) 10.0 - 15.0 %    Platelet Count 124 (L) 150 - 450 10e9/L    Diff Method Automated Method     % Neutrophils 69.7 %    % Lymphocytes 17.4 %    % Monocytes 11.2 %    % Eosinophils 0.2 %    % Basophils 1.5 %     % Immature Granulocytes 0.0 %    Nucleated RBCs 0 0 /100    Absolute Neutrophil 4.2 1.6 - 8.3 10e9/L    Absolute Lymphocytes 1.0 0.8 - 5.3 10e9/L    Absolute Monocytes 0.7 0.0 - 1.3 10e9/L    Absolute Eosinophils 0.0 0.0 - 0.7 10e9/L    Absolute Basophils 0.1 0.0 - 0.2 10e9/L    Abs Immature Granulocytes 0.0 0 - 0.4 10e9/L    Absolute Nucleated RBC 0.0    Comprehensive metabolic panel   Result Value Ref Range    Sodium 139 133 - 144 mmol/L    Potassium 3.3 (L) 3.4 - 5.3 mmol/L    Chloride 99 94 - 109 mmol/L    Carbon Dioxide 20 20 - 32 mmol/L    Anion Gap 20 (H) 3 - 14 mmol/L    Glucose 71 70 - 99 mg/dL    Urea Nitrogen 13 7 - 30 mg/dL    Creatinine 0.56 (L) 0.66 - 1.25 mg/dL    GFR Estimate >90 >60 mL/min/1.7m2    GFR Estimate If Black >90 >60 mL/min/1.7m2    Calcium 8.5 8.5 - 10.1 mg/dL    Bilirubin Total 2.5 (H) 0.2 - 1.3 mg/dL    Albumin 3.7 3.4 - 5.0 g/dL    Protein Total 8.7 6.8 - 8.8 g/dL    Alkaline Phosphatase 167 (H) 40 - 150 U/L     (H) 0 - 70 U/L     (H) 0 - 45 U/L   Lipase   Result Value Ref Range    Lipase 399 (H) 73 - 393 U/L   Magnesium   Result Value Ref Range    Magnesium 1.2 (L) 1.6 - 2.3 mg/dL   Alcohol level blood   Result Value Ref Range    Ethanol g/dL 0.01 (H) <0.01 g/dL   ABO/Rh type and screen   Result Value Ref Range    ABO A     RH(D) Pos     Antibody Screen Neg     Test Valid Only At          [facility]    Specimen Expires 11/29/2017    INR   Result Value Ref Range    INR 1.08 0.86 - 1.14      Medications   dextrose 5% and 0.9% NaCl 1,000 mL with INFUVITE 10 mL, thiamine 100 mg, folic acid 1 mg, magnesium sulfate 2 g infusion (not administered)   0.9% sodium chloride BOLUS (1,000 mLs Intravenous New Bag 11/26/17 1135)   ondansetron (ZOFRAN) injection 4 mg (4 mg Intravenous Given 11/26/17 1135)   diazepam (VALIUM) injection 10 mg (10 mg Intravenous Given 11/26/17 1135)   pantoprazole (PROTONIX) 40 mg IV push injection (40 mg  Intravenous Given 11/26/17 1233)         12:11 PM Feeling better.  Resting comfortably.  Minimal tremors.    12:51 PM Sleeping comfortably.  Awaken easily to voice.  Symptoms well controlled.    1:16 PM Tolerating ice chips without nausea. Abdomen- soft and non-tender.       Assessments & Plan (with Medical Decision Making)   52 year old male who presents to the emergency department in alcohol withdrawal.  He has been drinking alcohol daily for some time.  He is now expressing a desire to quit but began to experience alcohol withdrawal symptoms this morning with his last drink being yesterday evening.  The patient arrives tremulous with mild tachycardia.  He reports a history of withdrawal seizures and DTs.  The patient was treated with IV fluids, Zofran, and Valium.  His symptoms are well controlled.  The patient now denies any nausea and is able to tolerate ice chips.  His labs are remarkable for liver enzyme abnormality including his alk phos and transaminases consistent with previous results.  He has minimal elevation of his lipase.  The patient's magnesium level is low and he is also mildly hypokalemic.  He was given a banana bag with magnesium sulfate.  Potassium replacement also given.   The patient has brown, guaiac positive stool on exam but a normal hemoglobin and no history of hematemesis, melena or hematochezia.  Patient was given a single dose of IV Protonix as he likely has alcohol-related gastritis.  Do not suspect significant GI bleed.  The patient endorses some symptoms of depression but denies suicidal ideations.  There are no alcohol detox beds available here today.  The patient does have active alcohol withdrawal on presentation and will require admission for management of his alcohol withdrawal.  The patient will be admitted to the hospitalist service for further care.    I have reviewed the nursing notes.    I have reviewed the findings, diagnosis, plan and need for follow up with the  patient.    New Prescriptions    No medications on file       Final diagnoses:   Alcohol withdrawal syndrome without complication (H)   Hypomagnesemia   Hypokalemia       11/26/2017   [facility], EMERGENCY DEPARTMENT     Ventura Ba MD  11/26/17 1114

## 2017-11-26 NOTE — PHARMACY-ADMISSION MEDICATION HISTORY
Admission Medication History status for the 2017 admission is complete.  See EPIC admission navigator for Prior to Admission medications.    Medication history sources:  Patient, Care Everywhere ([facility])     Medication history source reliability: Poor- patient did not know any of his meds     Medication adherence:  Poor- patient has not taken any of his meds in over a month    Changes made to PTA medication list (reason)  Added:   -Hydroxyzine 25 mg tablets  -Acetaminophen 325 mg tablets  -Cholecalciferol 1000 unit tablets  -Fluoxetine 10 mg capsules  -Melatonin 1 mg tablets  -Milk of Magnesia 400 mg/5 mL suspension  -Mirtazapine 15 mg tablets  -Pantoprazole 40 mg  -Prazosin 2 mg capsules  -Risperidone 2 mg tablets  -Sennosides 806 mg tablets  -Therapeutic multivitamin tablets   -Augmented betamethasone dipropionate 0.05% ointment  Deleted:   -Clobetasol 0.05% ointment  -fluoxetine 20 mg capsules (presciption  on 17)  -mirtazapine 7.5 mg tabs (presciption  on 17)  Changed: None    Additional medication history information (including reliability of information, actions taken by pharmacist):   -Patient did not know the names of any of his medications.  -Patient states that he has not used any of his medications in over a month.  -Patient says that he should be on the medications he was on at [facility].  -All medications added to prior to admit list are from [facility] on Care Everywhere. All are from 2017.   -Called [facility] and the only med they had on patient's profile was acomprosate. It was never filled because insurance did not cover it.    Time spent in this activity: 45 minutes     Medication history completed by: MALAIKA Gee2 Pharmacy Intern    Prior to Admission medications    Medication Sig Last Dose Taking? Auth Provider   hydrOXYzine (ATARAX) 25 MG tablet Take 25 mg by mouth 3 times daily  More than  a month at Unknown time  Unknown, Entered By History   acetaminophen (TYLENOL) 325 MG tablet Take 650 mg by mouth 3 times daily as needed  More than a month at Unknown time  Unknown, Entered By History   cholecalciferol 1000 UNITS TABS Take 2,000 Units by mouth daily More than a month at Unknown time  Unknown, Entered By History   FLUoxetine (PROZAC) 10 MG capsule Take 30 mg by mouth daily More than a month at Unknown time  Unknown, Entered By History   melatonin 1 MG TABS tablet Take 1 mg by mouth nightly as needed for sleep More than a month at Unknown time  Unknown, Entered By History   magnesium hydroxide (MILK OF MAGNESIA) 400 MG/5ML suspension Take 30 mLs by mouth as needed for constipation More than a month at Unknown time  Unknown, Entered By History   mirtazapine (REMERON) 15 MG tablet Take 15 mg by mouth At Bedtime More than a month at Unknown time  Unknown, Entered By History   PANTOPRAZOLE SODIUM PO Take 40 mg by mouth every morning (before breakfast) More than a month at Unknown time  Unknown, Entered By History   prazosin (MINIPRESS) 2 MG capsule Take 4 mg by mouth At Bedtime More than a month at Unknown time  Unknown, Entered By History   risperiDONE (RISPERDAL) 2 MG tablet Take 2 mg by mouth At Bedtime More than a month at Unknown time  Unknown, Entered By History   sennosides (SENOKOT) 8.6 MG tablet Take 1 tablet by mouth 2 times daily as needed for constipation More than a month at Unknown time  Unknown, Entered By History   Multiple Vitamin (THERAPEUTIC MULTIVITAMIN PO) Take 1 tablet by mouth daily More than a month at Unknown time  Unknown, Entered By History   augmented betamethasone dipropionate (DIPROLENE-AF) 0.05 % ointment Apply topically 2 times daily More than a month at Unknown time  Unknown, Entered By History

## 2017-11-26 NOTE — IP AVS SNAPSHOT
MRN:[id number]                      After Visit Summary   11/26/2017    Mario Lamar II    MRN: [id number]           Thank you!     Thank you for choosing [facility] for your care. Our goal is always to provide you with excellent care. Hearing back from our patients is one way we can continue to improve our services. Please take a few minutes to complete the written survey that you may receive in the mail after you visit with us. Thank you!        Patient Information     Date Of Birth          1965        Designated Caregiver       Most Recent Value    Caregiver    Will someone help with your care after discharge? no      About your hospital stay     You were admitted on:  November 26, 2017 You last received care in the:  [facility] [address]    You were discharged on:  December 1, 2017        Reason for your hospital stay       Alcohol withdrawal                  Who to Call     For medical emergencies, please call 911.  For non-urgent questions about your medical care, please call your primary care provider or clinic, [phone]          Attending Provider     Provider Specialty    Ventura Ba MD Emergency Medicine    Mathew Robb MD Internal Medicine       Primary Care Provider Office Phone # Fax #    Elle Aguilar [phone]      After Care Instructions     Activity       Your activity upon discharge: activity as tolerated            Diet       Follow this diet upon discharge: Regular                  Follow-up Appointments     Adult [facility] Follow-up and recommended labs and tests       Follow up with primary care provider, Elle Aguilar, within 7 days for hospital follow- up.  No follow up labs or test are needed.      Appointments on [place] and/or [facility] campus (with [facility] or [facility] provider or service). Call [phone] if you haven't heard regarding these appointments within 7 days of discharge.                  Pending Results     No orders  "found from 2017 to 2017.            Statement of Approval     Ordered          17 0903  I have reviewed and agree with all the recommendations and orders detailed in this document.  EFFECTIVE NOW     Approved and electronically signed by:  Pedro Elena MD             Admission Information     Date & Time Department Dept. Phone    2017 [facility] [phone]      Your Vitals Were     Blood Pressure Pulse Temperature Respirations Height Weight    118/82 (BP Location: Right arm) 92 96.1  F (35.6  C) (Oral) 16 1.74 m (5' 8.5\") 68.7 kg (151 lb 6.4 oz)    Pulse Oximetry BMI (Body Mass Index)                98% 22.69 kg/m2          [employer] Information     [employer] lets you send messages to your doctor, view your test results, renew your prescriptions, schedule appointments and more. To sign up, go to [email or web address] . Click on \"Log in\" on the left side of the screen, which will take you to the Welcome page. Then click on \"Sign up Now\" on the right side of the page.     You will be asked to enter the access code listed below, as well as some personal information. Please follow the directions to create your username and password.     Your access code is: [email or web address]  Expires: 3/1/2018  9:03 AM     Your access code will  in 90 days. If you need help or a new code, please call your [facility] clinic or [phone].        Care EveryWhere ID     This is your Care EveryWhere ID. This could be used by other organizations to access your [facility] medical records  [id number]        Equal Access to Services     [facility] : Geri Bender, marko galvan, quentin hensley. So [facility] [phone].    ATENCIÓN: Si habla español, tiene a lo disposición servicios gratuitos de asistencia lingüística. Llame al [phone].    We comply with applicable federal civil rights laws and [place] laws. We do not discriminate on the " basis of race, color, national origin, age, disability, sex, sexual orientation, or gender identity.               Review of your medicines      START taking        Dose / Directions    escitalopram 10 MG tablet   Commonly known as:  LEXAPRO   Used for:  Depression with suicidal ideation        Dose:  10 mg   Start taking on:  12/2/2017   Take 1 tablet (10 mg) by mouth daily   Quantity:  60 tablet   Refills:  0         CONTINUE these medicines which have NOT CHANGED        Dose / Directions    acetaminophen 325 MG tablet   Commonly known as:  TYLENOL        Dose:  650 mg   Take 650 mg by mouth 3 times daily as needed   Refills:  0       cholecalciferol 1000 UNITS Tabs        Dose:  2000 Units   Take 2,000 Units by mouth daily   Refills:  0       hydrOXYzine 25 MG tablet   Commonly known as:  ATARAX   Used for:  Alcohol withdrawal syndrome without complication (H)        Dose:  25 mg   Take 1 tablet (25 mg) by mouth 3 times daily   Quantity:  30 tablet   Refills:  0       melatonin 1 MG Tabs tablet        Dose:  1 mg   Take 1 mg by mouth nightly as needed for sleep   Refills:  0       mirtazapine 15 MG tablet   Commonly known as:  REMERON   Used for:  Depression with suicidal ideation        Dose:  15 mg   Take 1 tablet (15 mg) by mouth At Bedtime   Quantity:  90 tablet   Refills:  1       THERAPEUTIC MULTIVITAMIN PO        Dose:  1 tablet   Take 1 tablet by mouth daily   Refills:  0         STOP taking     augmented betamethasone dipropionate 0.05 % ointment   Commonly known as:  DIPROLENE-AF           FLUoxetine 10 MG capsule   Commonly known as:  PROzac           magnesium hydroxide 400 MG/5ML suspension   Commonly known as:  MILK OF MAGNESIA           PANTOPRAZOLE SODIUM PO           prazosin 2 MG capsule   Commonly known as:  MINIPRESS           risperiDONE 2 MG tablet   Commonly known as:  risperDAL           sennosides 8.6 MG tablet   Commonly known as:  SENOKOT                Where to get your medicines       These medications were sent to [facility] [place], [place] - [address]  [address] [facility] [address], [facility] [address]     Phone:  [phone]     escitalopram 10 MG tablet    hydrOXYzine 25 MG tablet    mirtazapine 15 MG tablet                Protect others around you: Learn how to safely use, store and throw away your medicines at www.disposemymeds.org.             Medication List: This is a list of all your medications and when to take them. Check marks below indicate your daily home schedule. Keep this list as a reference.      Medications           Morning Afternoon Evening Bedtime As Needed    acetaminophen 325 MG tablet   Commonly known as:  TYLENOL   Take 650 mg by mouth 3 times daily as needed                                cholecalciferol 1000 UNITS Tabs   Take 2,000 Units by mouth daily                                escitalopram 10 MG tablet   Commonly known as:  LEXAPRO   Take 1 tablet (10 mg) by mouth daily   Start taking on:  12/2/2017   Last time this was given:  10 mg on 12/1/2017  8:54 AM                                hydrOXYzine 25 MG tablet   Commonly known as:  ATARAX   Take 1 tablet (25 mg) by mouth 3 times daily                                melatonin 1 MG Tabs tablet   Take 1 mg by mouth nightly as needed for sleep                                mirtazapine 15 MG tablet   Commonly known as:  REMERON   Take 1 tablet (15 mg) by mouth At Bedtime   Last time this was given:  15 mg on 11/30/2017  9:33 PM                                THERAPEUTIC MULTIVITAMIN PO   Take 1 tablet by mouth daily

## 2017-11-26 NOTE — PROGRESS NOTES
"Notified moonlighter at 1758 in regards to black \"callus\" on right big toe and nickel-sized scabed wound on inner left ankle.  "

## 2017-11-26 NOTE — H&P
[facility] History and Physical    Mario Lamar II MRN# [id number]   Age: 52 year old YOB: 1965     Date of Admission:  11/26/2017    Primary care provider: Elle Aguilar          Assessment and Plan:   Mario Lamar II is 52 year old male with a history of major depressive disorder, PSTD, and alcohol use disorder who presents with diaphoresis, shakiness, dizziness, and emesis consistent with alcohol withdrawal.    Alcohol use disorder with alcohol withdrawal  Hypokalemia and hypomagnesemia  Presents with tremors, nausea, and emesis. Has history of withdrawal seizures and DT. Last alcoholic drink was last night. Ethanol 0.01 on admission. Hypokalemia and hypomagnesemia likely secondary to alcohol use.  -- MSSA protocol with Valium  -- Daily thiamine, folate, and multivitamin  -- PRN Zofran  -- 1 L NS at 100 cc/hr  -- K and Mg supplementation protocol  -- Chemical dependency consult placed    Concern for hematemesis and melena  Hgb on admission stable at 14. Uncertain whether patient had hematemesis. Rectal exam negative for melena. Differential for hematemesis includes gastritis, Lisa-Murcia tear in the setting of frequent episodes of emesis, ulcers, varices, and Dieulafoy lesion. No EGD/colonoscopy on file. Received IV protonix in ED.  -- Protonix 40 mg IV BID   -- Will continue to monitor for hematemesis and GIB/melena  -- Will trend Hgb  -- Will not start octreotide at this time since suspicion for variceal bleeding is low since cirrhosis was not seen on abdominal US at [facility] [place] in [place]  -- If no recurrent GIB, consider discontinuing IV BID and resuming PTA PO PPI    Hepatitis   Hepatic steatosis  Hepatic steatosis seen on abdominal US at [facility] [place] in 8/2017. Presents with elevated LFTs that are stable for previous levels.  -- Continue to monitor    Mildly elevated lipase  Lipase 399 on admission. No concern for pancreatitis at this time. Has history of pancreatitis. No  upper abdominal pain and lipase not >3x ULN. Lipase stable to level in 6/2017.  -- Continue to monitor    Chest discomfort  Likely musculoskeletal discomfort since chest discomfort was reproducible to palpation. EKG did not show any ischemic changes and initial troponin negative.  -- Repeat troponin at 2000 to rule out ACS    Depression  PTSD  Sleep difficulties with auditory hallucinations  Patient has not been taking his psychiatric medications for 1 month. Was previously on fluoxetine, atarax, melatonin, mirtazapine, prazosin, and risperidone.  -- Resumed PRN melatonin  -- Will continue to hold antipsychotics  -- Psychiatry consulted    Calluses on bilateral feet  -- Podiatry consulted      HENRYI: Regular diet  Ppx: SCDs  Code status: Full Code  Disposition: Admitted inpatient     Cecile Hanson MD, PhD  [facility]  [id number]          Chief Complaint:   Sweaty, shaky, dizzy, and vomiting     History is obtained from the patient and chart review.         History of Present Illness:   Mario Lamar II is 52 year old male with a history of major depressive disorder, PSTD, and alcohol use disorder who presents with diaphoresis, shakiness, dizziness, and emesis.    Notes feeling diaphoretic, shaky, chills, and dizziness this morning while at sister's house. Vomited ~30 times today with yellow emesis and thought that he may have had red specks in 6 episodes of emesis today. He has been drinking 1/2 gallon vodka daily since October 2017, last drink was last night. Per patient, has previously had DT and alcohol withdrawal seizures, last seizure 1 month ago. Was living in a sober house for 2 weeks in October. Per patient, was in an alcohol treatment program from Feb-May of this year in [place]. [place] recently [place] in [place] [facility]    Denied fevers. Had mid chest discomfort that started 1 hour prior to interview and resolved during interview without any interventions. EKG did  not show any ischemic changes and troponin negative. Denied SOB. Endorsed having lower abdominal pain earlier today but no current abdominal pain. No hematochezia. Endorsed having melena since October. Endorsed having diarrhea over the last 2 days with ~3 loose BM/day. No constipation, dysuria, hematuria, or rashes.    Has not taken any of his prescription medications for 1 month. Notes that he has not taken any of his medications because he has been drinking. Denied SI/HI.    In ED, received 1 L NS bolus, banana bag with magnesium sulfate, Valium, zofran, and protonix 40 mg IV once.           Past Medical History:   Past Medical History:   Diagnosis Date     Depression      PTSD (post-traumatic stress disorder)      Substance abuse     alcohol abuse             Past Surgical History:      Past Surgical History:   Procedure Laterality Date     ORTHOPEDIC SURGERY      Right ankle orthopedic surgery after fracture.             Social History:     Social History     Social History     Marital status:      Spouse name: N/A     Number of children: N/A     Years of education: N/A     Occupational History     Not on file.     Social History Main Topics     Smoking status: Former Smoker     Packs/day: 0.50     Years: 33.00     Quit date: 10/26/2017     Smokeless tobacco: Former User     Alcohol use Yes      Comment: 1/2 gal vodka every day     Drug use: No     Sexual activity: Yes     Partners: Female     Other Topics Concern     Not on file     Social History Narrative             Family History:     Family History   Problem Relation Age of Onset     Alcoholism Sister      Family history reviewed and updated in EPIC            Allergies:   No Known Allergies          Medications:     Was not taking any medications over the last month.    No current facility-administered medications for this encounter.      Current Outpatient Prescriptions   Medication Sig     hydrOXYzine (ATARAX) 25 MG tablet Take 25 mg by mouth 3  times daily      acetaminophen (TYLENOL) 325 MG tablet Take 650 mg by mouth 3 times daily as needed      cholecalciferol 1000 UNITS TABS Take 2,000 Units by mouth daily     FLUoxetine (PROZAC) 10 MG capsule Take 30 mg by mouth daily     melatonin 1 MG TABS tablet Take 1 mg by mouth nightly as needed for sleep     magnesium hydroxide (MILK OF MAGNESIA) 400 MG/5ML suspension Take 30 mLs by mouth as needed for constipation     mirtazapine (REMERON) 15 MG tablet Take 15 mg by mouth At Bedtime     PANTOPRAZOLE SODIUM PO Take 40 mg by mouth every morning (before breakfast)     prazosin (MINIPRESS) 2 MG capsule Take 4 mg by mouth At Bedtime     risperiDONE (RISPERDAL) 2 MG tablet Take 2 mg by mouth At Bedtime     sennosides (SENOKOT) 8.6 MG tablet Take 1 tablet by mouth 2 times daily as needed for constipation     Multiple Vitamin (THERAPEUTIC MULTIVITAMIN PO) Take 1 tablet by mouth daily     augmented betamethasone dipropionate (DIPROLENE-AF) 0.05 % ointment Apply topically 2 times daily             Review of Systems:   A 10 point comprehensive review of systems was performed and found to be negative except as described in this note           Physical Exam:   Vitals were reviewed  Temp: 98.7  F (37.1  C) Temp src: Oral BP: 130/84 Pulse: 94   Resp: 20 SpO2: 100 % O2 Device: None (Room air)      General: mildly diaphoretic, laying in bed  HEENT: NC/AT, PERRL, EOMI, bilateral conjunctival injection, MMM  Neck: no cervical lymphadenopathy  CV: tachycardic, RRR, normal S1 and S2, no murmurs, 2+ bilateral radial pulses. Mid-chest discomfort reproducible to palpation  Resp: CTAB, no w/r/r, on RA  Abd: soft, nondistended, mild TTP in RUQ, negative Corona's, no rebound, no guarding, bowel sounds present   : rectal exam showed no stool, normal sphincter tone, no external hemorrhoids  Extremities: no lower extremity edema. Bilateral hand tremors. Black calluses on bilateral feet on plantar and dorsal surfaces.  Neuro: alert and  oriented            Data:   All laboratory and imaging data in the past 24 hours reviewed   Recent Results (from the past 24 hour(s))   CBC with platelets differential    Collection Time: 11/26/17 11:29 AM   Result Value Ref Range    WBC 6.0 4.0 - 11.0 10e9/L    RBC Count 4.45 4.4 - 5.9 10e12/L    Hemoglobin 14.0 13.3 - 17.7 g/dL    Hematocrit 40.3 40.0 - 53.0 %    MCV 91 78 - 100 fl    MCH 31.5 26.5 - 33.0 pg    MCHC 34.7 31.5 - 36.5 g/dL    RDW 15.1 (H) 10.0 - 15.0 %    Platelet Count 124 (L) 150 - 450 10e9/L    Diff Method Automated Method     % Neutrophils 69.7 %    % Lymphocytes 17.4 %    % Monocytes 11.2 %    % Eosinophils 0.2 %    % Basophils 1.5 %    % Immature Granulocytes 0.0 %    Nucleated RBCs 0 0 /100    Absolute Neutrophil 4.2 1.6 - 8.3 10e9/L    Absolute Lymphocytes 1.0 0.8 - 5.3 10e9/L    Absolute Monocytes 0.7 0.0 - 1.3 10e9/L    Absolute Eosinophils 0.0 0.0 - 0.7 10e9/L    Absolute Basophils 0.1 0.0 - 0.2 10e9/L    Abs Immature Granulocytes 0.0 0 - 0.4 10e9/L    Absolute Nucleated RBC 0.0    Comprehensive metabolic panel    Collection Time: 11/26/17 11:29 AM   Result Value Ref Range    Sodium 139 133 - 144 mmol/L    Potassium 3.3 (L) 3.4 - 5.3 mmol/L    Chloride 99 94 - 109 mmol/L    Carbon Dioxide 20 20 - 32 mmol/L    Anion Gap 20 (H) 3 - 14 mmol/L    Glucose 71 70 - 99 mg/dL    Urea Nitrogen 13 7 - 30 mg/dL    Creatinine 0.56 (L) 0.66 - 1.25 mg/dL    GFR Estimate >90 >60 mL/min/1.7m2    GFR Estimate If Black >90 >60 mL/min/1.7m2    Calcium 8.5 8.5 - 10.1 mg/dL    Bilirubin Total 2.5 (H) 0.2 - 1.3 mg/dL    Albumin 3.7 3.4 - 5.0 g/dL    Protein Total 8.7 6.8 - 8.8 g/dL    Alkaline Phosphatase 167 (H) 40 - 150 U/L     (H) 0 - 70 U/L     (H) 0 - 45 U/L   Lipase    Collection Time: 11/26/17 11:29 AM   Result Value Ref Range    Lipase 399 (H) 73 - 393 U/L   Magnesium    Collection Time: 11/26/17 11:29 AM   Result Value Ref Range    Magnesium 1.2 (L) 1.6 - 2.3 mg/dL   Alcohol level blood     Collection Time: 11/26/17 11:29 AM   Result Value Ref Range    Ethanol g/dL 0.01 (H) <0.01 g/dL   ABO/Rh type and screen    Collection Time: 11/26/17 11:29 AM   Result Value Ref Range    ABO A     RH(D) Pos     Antibody Screen Neg     Test Valid Only At          [facility]    Specimen Expires 11/29/2017    INR    Collection Time: 11/26/17 11:29 AM   Result Value Ref Range    INR 1.08 0.86 - 1.14   Phosphorus    Collection Time: 11/26/17 11:29 AM   Result Value Ref Range    Phosphorus 2.6 2.5 - 4.5 mg/dL   Troponin POCT    Collection Time: 11/26/17  3:41 PM   Result Value Ref Range    Troponin I 0.00 0.00 - 0.10 ug/L       EKG 11/26/17: NSR, no ST segment elevations or T wave inversions.

## 2017-11-27 LAB
ALBUMIN SERPL-MCNC: 3.1 G/DL (ref 3.4–5)
ALP SERPL-CCNC: 145 U/L (ref 40–150)
ALT SERPL W P-5'-P-CCNC: 96 U/L (ref 0–70)
ANION GAP SERPL CALCULATED.3IONS-SCNC: 10 MMOL/L (ref 3–14)
AST SERPL W P-5'-P-CCNC: 144 U/L (ref 0–45)
BACTERIA SPEC CULT: NORMAL
BACTERIA SPEC CULT: NORMAL
BILIRUB SERPL-MCNC: 2.7 MG/DL (ref 0.2–1.3)
BUN SERPL-MCNC: 9 MG/DL (ref 7–30)
CALCIUM SERPL-MCNC: 8.2 MG/DL (ref 8.5–10.1)
CHLORIDE SERPL-SCNC: 102 MMOL/L (ref 94–109)
CO2 SERPL-SCNC: 25 MMOL/L (ref 20–32)
CREAT SERPL-MCNC: 0.58 MG/DL (ref 0.66–1.25)
ERYTHROCYTE [DISTWIDTH] IN BLOOD BY AUTOMATED COUNT: 15 % (ref 10–15)
GFR SERPL CREATININE-BSD FRML MDRD: >90 ML/MIN/1.7M2
GLUCOSE SERPL-MCNC: 110 MG/DL (ref 70–99)
HCT VFR BLD AUTO: 40.3 % (ref 40–53)
HGB BLD-MCNC: 13.7 G/DL (ref 13.3–17.7)
INTERPRETATION ECG - MUSE: NORMAL
LIPASE SERPL-CCNC: 492 U/L (ref 73–393)
Lab: NORMAL
MAGNESIUM SERPL-MCNC: 1.9 MG/DL (ref 1.6–2.3)
MCH RBC QN AUTO: 31.2 PG (ref 26.5–33)
MCHC RBC AUTO-ENTMCNC: 34 G/DL (ref 31.5–36.5)
MCV RBC AUTO: 92 FL (ref 78–100)
PLATELET # BLD AUTO: 84 10E9/L (ref 150–450)
POTASSIUM SERPL-SCNC: 3.6 MMOL/L (ref 3.4–5.3)
PROT SERPL-MCNC: 7.6 G/DL (ref 6.8–8.8)
RBC # BLD AUTO: 4.39 10E12/L (ref 4.4–5.9)
SODIUM SERPL-SCNC: 137 MMOL/L (ref 133–144)
SPECIMEN SOURCE: NORMAL
WBC # BLD AUTO: 4.7 10E9/L (ref 4–11)

## 2017-11-27 PROCEDURE — 25000125 ZZHC RX 250: Performed by: STUDENT IN AN ORGANIZED HEALTH CARE EDUCATION/TRAINING PROGRAM

## 2017-11-27 PROCEDURE — 25000132 ZZH RX MED GY IP 250 OP 250 PS 637: Performed by: PODIATRIST

## 2017-11-27 PROCEDURE — 99223 1ST HOSP IP/OBS HIGH 75: CPT | Performed by: PSYCHIATRY & NEUROLOGY

## 2017-11-27 PROCEDURE — 25000132 ZZH RX MED GY IP 250 OP 250 PS 637: Performed by: INTERNAL MEDICINE

## 2017-11-27 PROCEDURE — 84132 ASSAY OF SERUM POTASSIUM: CPT | Performed by: INTERNAL MEDICINE

## 2017-11-27 PROCEDURE — 12000001 ZZH R&B MED SURG/OB UMMC

## 2017-11-27 PROCEDURE — 80053 COMPREHEN METABOLIC PANEL: CPT | Performed by: STUDENT IN AN ORGANIZED HEALTH CARE EDUCATION/TRAINING PROGRAM

## 2017-11-27 PROCEDURE — 25000132 ZZH RX MED GY IP 250 OP 250 PS 637: Performed by: STUDENT IN AN ORGANIZED HEALTH CARE EDUCATION/TRAINING PROGRAM

## 2017-11-27 PROCEDURE — 36415 COLL VENOUS BLD VENIPUNCTURE: CPT | Performed by: STUDENT IN AN ORGANIZED HEALTH CARE EDUCATION/TRAINING PROGRAM

## 2017-11-27 PROCEDURE — 99231 SBSQ HOSP IP/OBS SF/LOW 25: CPT | Performed by: INTERNAL MEDICINE

## 2017-11-27 PROCEDURE — 85027 COMPLETE CBC AUTOMATED: CPT | Performed by: STUDENT IN AN ORGANIZED HEALTH CARE EDUCATION/TRAINING PROGRAM

## 2017-11-27 PROCEDURE — 83735 ASSAY OF MAGNESIUM: CPT | Performed by: STUDENT IN AN ORGANIZED HEALTH CARE EDUCATION/TRAINING PROGRAM

## 2017-11-27 PROCEDURE — 83690 ASSAY OF LIPASE: CPT | Performed by: STUDENT IN AN ORGANIZED HEALTH CARE EDUCATION/TRAINING PROGRAM

## 2017-11-27 RX ORDER — PANTOPRAZOLE SODIUM 40 MG/1
40 TABLET, DELAYED RELEASE ORAL EVERY MORNING
Status: DISCONTINUED | OUTPATIENT
Start: 2017-11-27 | End: 2017-12-01 | Stop reason: HOSPADM

## 2017-11-27 RX ORDER — UREA 40 %
CREAM (GRAM) TOPICAL DAILY
Status: DISCONTINUED | OUTPATIENT
Start: 2017-11-27 | End: 2017-12-01 | Stop reason: HOSPADM

## 2017-11-27 RX ORDER — SODIUM CHLORIDE 9 MG/ML
INJECTION, SOLUTION INTRAVENOUS
Status: DISPENSED
Start: 2017-11-27 | End: 2017-11-27

## 2017-11-27 RX ORDER — ESCITALOPRAM OXALATE 5 MG/1
10 TABLET ORAL DAILY
Status: DISCONTINUED | OUTPATIENT
Start: 2017-11-27 | End: 2017-12-01 | Stop reason: HOSPADM

## 2017-11-27 RX ORDER — MIRTAZAPINE 15 MG/1
15 TABLET, FILM COATED ORAL AT BEDTIME
Status: DISCONTINUED | OUTPATIENT
Start: 2017-11-27 | End: 2017-12-01 | Stop reason: HOSPADM

## 2017-11-27 RX ADMIN — DIAZEPAM 10 MG: 5 TABLET ORAL at 16:54

## 2017-11-27 RX ADMIN — FOLIC ACID 1 MG: 1 TABLET ORAL at 09:29

## 2017-11-27 RX ADMIN — MIRTAZAPINE 15 MG: 15 TABLET, FILM COATED ORAL at 22:18

## 2017-11-27 RX ADMIN — DIAZEPAM 10 MG: 5 TABLET ORAL at 18:56

## 2017-11-27 RX ADMIN — ESCITALOPRAM OXALATE 10 MG: 5 TABLET, FILM COATED ORAL at 15:02

## 2017-11-27 RX ADMIN — POTASSIUM CHLORIDE 20 MEQ: 750 TABLET, FILM COATED, EXTENDED RELEASE ORAL at 09:29

## 2017-11-27 RX ADMIN — Medication 2 G: at 09:26

## 2017-11-27 RX ADMIN — DIAZEPAM 5 MG: 5 TABLET ORAL at 12:19

## 2017-11-27 RX ADMIN — DIAZEPAM 10 MG: 5 TABLET ORAL at 22:18

## 2017-11-27 RX ADMIN — PANTOPRAZOLE SODIUM 40 MG: 40 INJECTION, POWDER, FOR SOLUTION INTRAVENOUS at 08:05

## 2017-11-27 RX ADMIN — MULTIPLE VITAMINS W/ MINERALS TAB 1 TABLET: TAB at 09:29

## 2017-11-27 RX ADMIN — UREA: 400 CREAM TOPICAL at 15:05

## 2017-11-27 RX ADMIN — DIAZEPAM 5 MG: 5 TABLET ORAL at 08:13

## 2017-11-27 RX ADMIN — Medication 100 MG: at 09:29

## 2017-11-27 ASSESSMENT — ACTIVITIES OF DAILY LIVING (ADL)
ADLS_ACUITY_SCORE: 12
ADLS_ACUITY_SCORE: 13
ADLS_ACUITY_SCORE: 13
ADLS_ACUITY_SCORE: 12
ADLS_ACUITY_SCORE: 12
ADLS_ACUITY_SCORE: 13

## 2017-11-27 NOTE — PLAN OF CARE
Problem: Alcohol Withdrawal Acute, Risk/Actual (Adult)  Goal: Signs and Symptoms of Listed Potential Problems Will be Absent, Minimized or Managed (Alcohol Withdrawal Acute, Risk/Actual)  Signs and symptoms of listed potential problems will be absent, minimized or managed by discharge/transition of care (reference Alcohol Withdrawal Acute, Risk/Actual (Adult) CPG).           VS:       Pt A/O X 4. Afebrile. VSS. Lungs- clear bilaterally with both       anterior and posterior. IS encouraged. Denies nausea, shortness of breath, and chest pain. Pt is on MSSA, given Valium 5 mg x 2 for scores of 9 and 10 this shift.     Output:       Bowels- active in all four quadrants. Voids spontaneously without   difficulty in the bathroom.      Activity:       Pt up in room and to bathroom with assist of standby. Bed alarm is on and activated for safety. Side rails are padded.     Skin:   Blood blisters to toes on right foot are intact, callus intact to heel on left foot. Cream applied as ordered.     Pain:       Pt had abdominal pain this morning, improved this afternoon per pt report.     CMS:       CMS and Neuro's are intact. Denies numbness and tingling in all extremities.      Dressing:       No dressings in place.     Diet:       Pt is on a regular diet and appetite was fair this shift. Pt ate half of lunch and then fell asleep per pt report. Pt also tolerated applesauce, vanilla pudding, and iza crackers.       LDA:       PIV is patent in the right arm and SL.      Equipment:       Bilateral heels are elevated off the bed.     Plan:       Pt is able to make needs known and the call light is within the pt's reach. Bed alarm is on and activated for patient safety. Continue to monitor.       Additional Info:

## 2017-11-27 NOTE — CONSULTS
Psychiatric consultation note      Patient name: Mario Lamar II   MRN: [id number]    Age: 52 year old     YOB: 1965    Reason for consultation:    Assist in management of restarting antipsychotic medications if indicated.    History of present illness:    The patient has a history of major depressive disorder and alcohol dependence. He was admitted to the medical unit for complications related to ongoing alcohol usage and withdrawal. The patient had been noncompliant with his psychotropic medications for over one month and consultation was requested to help assist in restarting the appropriate medications. The patient tells me that he recently maintained approximately one month of sobriety while residing in a sober home. During that time, his mood was reported as being euthymic and he felt to be in good health and good spirit. He attended AA meetings regularly and maintain contact with sober supports.  He recalled that one day, he received a phone call from his girlfriend who was also saying in a different sober home, informing him that she was moving out with a plan to relapse on alcohol. He tells me that this influenced him to also leave his group home which was followed by a relapse on alcohol the same day. Over the past one month he has been drinking nearly half a gallon of liquor on a daily basis. During the same time, he has not been taking psychotropic medications. His mood has progressively become more depressed, and while intoxicated with alcohol, he recalled experiencing suicidal thoughts. In a moment of discomfort from alcohol withdrawal, he presented to the emergency room to seek help.    Psychiatric Review of Systems:    -- Depressive episode:  His mood has been depressed, characterized as mild to moderate, accompanied with low-energy, low appetite, low concentration. He denied feeling helpless and hopeless. He reported suicidal thoughts only being present during moments of  "severe alcohol intoxication. He denied suicidal thoughts on examination. He denied homicidal thoughts.  -- Melony:  denies symptoms unrelated to alcohol usage  -- Psychosis:  denies symptoms  -- Anxiety: denies symptoms corresponding to [place]IL[place] or panic disorder  -- PTSD: denies symptoms  -- OCD: denies  symptoms  -- Eating disorder: denies symptoms    Psychiatric History:    He identified a history of major depressive disorder with numerous prior inpatient hospitalizations. He has attempted suicide several times, describing attempts to cut his wrist, attempted hangings, and standing on a bridge while contemplating jumping. He reported that all of his attempts at suicide had occurred while under the influence of alcohol. He has established outpatient mental health providers for medication management and therapy however has not been compliant with appointments over the past one month. He was most recently prescribed Prozac, Remeron, and risperidone.    Substance Use History:    History of alcohol addiction over 20+ years with prior admissions to detox and treatment. He identified loss of control over use, progressive use, tolerance, withdrawal, and various health-related consequences along with relational and functional consequences related to his use.    Medical History:  Defer to internal medicine notes     Social History:  Refer to the psychosocial assessment completed by our .     Family History:    The patient denied a family history of mental illnesses or suicide attempts    Medical review of systems: 10 systems were reviewed and positive for psychiatric symptoms as noted above otherwise negative    Physical Exam:    B/P: 123/78, T: 99.1, P: 88, R: 16  Estimated body mass index is 23 kg/(m^2) as calculated from the following:    Height as of this encounter: 1.74 m (5' 8.5\").    Weight as of this encounter: 69.6 kg (153 lb 8 oz).    The rest of the physical examination was reviewed in the emergency room " "note completed by the emergency room physician.      Mental status examination:  Appearance:  Alert, fair hygiene, no acute distress  Attitude:  Attempts to be cooperative  Eye Contact: Fair  Mood:   \"Sad\"  Affect: Mood congruent and blunted  Speech:  Normal rates, tone, latency, volume. Not pushed or pressured.  Psychomotor Behavior:  No psychomotor abnormalities noted  Thought Process: Linear and logical; not tangential or circumstantial or disorganized  Associations:  Logical; no loose associations Noted  Thought Content:  No obvious paranoia, delusions, ideas of reference, or grandiosity noted. Denies auditory or visual hallucinations. Denies suicidal Ideations. Denies homicidal ideations.  Insight:  Fair  Judgment:  Fair  Oriented to:  time, person, and place  Attention Span and Concentration:  Intact  Recent and Remote Memory: Intact based on interviewing and details provided  Language: Appropriate based on interviewing  Fund of Knowledge: Appropriate based on interviewing  Muscle Strength and Tone: Normal upon visual inspection  Gait and Station: Normal upon visual inspection            Diagnoses:    Major Depressive Disorder - recurrent, moderate  Alcohol Use Disorder, revere     Recommendations:  1. Given the potential for ongoing hepatic impairment, I do not recommend restarting prozac. Instead, start lexapro 10 mg daily to address depressive symptoms. Remeron would be safe to restart at 15mg qhs to augment his antidepressant and help improve his sleep and appetite. Continue MSSA protocol to minimize alcohol withdrawal symptoms.     2. Agree with obtaining a chemical health assessment to help gain resources for outpatient support to maintain sobriety. He is not accepting inpatient options for chemical addition treatment at this time despite my recommendation for this treatment mode. He is expecting admission to an apartment in a sober community on Nov 30. Our  can further confirm this and help " coordinate outpatient appointments with his . He has established outpatient mental health providers and may follow up with them. We will help facilitate appointments.     3. Contact me as needed with any questions or concerns: [phone]

## 2017-11-27 NOTE — PROGRESS NOTES
"Admitted last night due to withdrawal from alcohol. Drinks half a gallon of vodka daily   Hx withdrawal seizure   On exam ;  Alert , bit slow mentation   Tremor  Vital signs:  Temp: 97.6  F (36.4  C) Temp src: Oral BP: 114/76 Pulse: 67   Resp: 16 SpO2: 96 % O2 Device: None (Room air)   Height: 174 cm (5' 8.5\") Weight: 69.6 kg (153 lb 8 oz)  Estimated body mass index is 23 kg/(m^2) as calculated from the following:    Height as of this encounter: 1.74 m (5' 8.5\").    Weight as of this encounter: 69.6 kg (153 lb 8 oz).  Perr, eomi  Neck ; supple. No jvd . No cb  Chest ; clear bl  cvs ; RRR  Gi ; soft , non tender, bs positive  Ext ; no edema  Labs ; lipase 492 ( 399)  Hb 13.7  ALT 96    UA blood   UDS ; BDZ  A/P  Alcohol abuse and dependence   MSSA with valium   Thiamin   Folic acid  MVI  Declined treatment     Hypomag and hypo K ;  Replace    Questionable GIB ; hb stable. Rectal exam no melena. Out patient egd    Elevated lipase ; tolerating diet     Depression and PTSD and auditory hallucinations  ; not taking medications for a month .  Re-consult psych  Disposition Plan   Expected discharge in 1-2days to prior living arrangement once stable from withdrawal perspective , psych consultation      Entered: Casandra Renteria 11/27/2017, 10:22 AM       "

## 2017-11-27 NOTE — CONSULTS
Past Medical History:   Diagnosis Date     Depression      PTSD (post-traumatic stress disorder)      Substance abuse     alcohol abuse     Patient Active Problem List   Diagnosis     Depression with suicidal ideation     Depression     Alcohol withdrawal (H)     Past Surgical History:   Procedure Laterality Date     ORTHOPEDIC SURGERY      Right ankle orthopedic surgery after fracture.     Social History     Social History     Marital status:      Spouse name: N/A     Number of children: N/A     Years of education: N/A     Occupational History     Not on file.     Social History Main Topics     Smoking status: Former Smoker     Packs/day: 0.50     Years: 33.00     Quit date: 10/26/2017     Smokeless tobacco: Former User     Alcohol use Yes      Comment: 1/2 gal vodka every day     Drug use: No     Sexual activity: Yes     Partners: Female     Other Topics Concern     Not on file     Social History Narrative     Family History   Problem Relation Age of Onset     Alcoholism Sister      Lab Results   Component Value Date    WBC 4.7 11/27/2017     Lab Results   Component Value Date    RBC 4.39 11/27/2017     Lab Results   Component Value Date    HGB 13.7 11/27/2017     Lab Results   Component Value Date    HCT 40.3 11/27/2017     No components found for: MCT  Lab Results   Component Value Date    MCV 92 11/27/2017     Lab Results   Component Value Date    MCH 31.2 11/27/2017     Lab Results   Component Value Date    MCHC 34.0 11/27/2017     Lab Results   Component Value Date    RDW 15.0 11/27/2017     Lab Results   Component Value Date    PLT 84 11/27/2017     Last Basic Metabolic Panel:  Lab Results   Component Value Date     11/27/2017      Lab Results   Component Value Date    POTASSIUM 3.6 11/27/2017     Lab Results   Component Value Date    CHLORIDE 102 11/27/2017     Lab Results   Component Value Date    JOHN 8.2 11/27/2017     Lab Results   Component Value Date    CO2 25 11/27/2017     Lab Results    Component Value Date    BUN 9 11/27/2017     Lab Results   Component Value Date    CR 0.58 11/27/2017     Lab Results   Component Value Date     11/27/2017     Note Dictated.

## 2017-11-27 NOTE — PLAN OF CARE
"Problem: Alcohol Withdrawal Acute, Risk/Actual (Adult)  Intervention: Minimize/Manage Withdrawal Symptoms    Pt is alert and oriented. CMS/neuros intact. Affect is flat and pt is withdrawn.   LS: crackles auscultated in LLL and RLL anteriorly. BS present and audible in all quadrants. Passing flatus. Had one episode of loose stool this evening.  Denies nausea, vomiting, headache, SOB, numbness/tingling in all extremities.  He c/o sharp pain in RUQ that is constant but tolerable for now. Reports he had a R rib fracture a couple of weeks ago but was not treated for it. Insists he \"toughed\" it out and it resolved.  MSSA = 14. 10mg of Valium administered per protocol. Pt also reports a hx of pancreatitis which he states is \"affecting my liver.\"  Denies auditory and visual hallucinations. He reports a recent suicide attempt in October, states he tried to jump in front of the lightrail. Denies any SI presently.  Currently homeless but reports he'll be getting an apartment in the next 3 weeks. Upon DC, he plans to go to his sober house to get his belongings before moving in.  Bed alarm activated for safety. Continue POC.        "

## 2017-11-27 NOTE — CONSULTS
SUBJECTIVE FINDINGS:  Mario Lamar is a 52-year-old male consulted with Podiatry by Cecile Hanson MD, for calluses.  The patient is seen at bedside resting.  He relates he is not diabetic.  He relates he did get some blistering.  He has been doing a lot of walking for a while and about the last month he noticed some calluses.  They do not really hurt.  He thought maybe he had too tight of shoes which he did change.  No other specific relieving or aggravating factors.  No specific injuries.      OBJECTIVE FINDINGS:  BP and PT are 2/4 bilaterally.  He has hyperkeratotic tissue build-up plantar medial right hallux, plantar right second and third MPJ, plantar right fifth MPJ, minimally on the plantar second and third MPJs on the left, posterior heel, left greater than right.  He has dorsal contracted digits bilaterally.  There is no erythema, no drainage, no odor, no calor bilaterally.  He has some dried blood blisters on the right hallux and second toe.  There are no gross focal lesions.      ASSESSMENT AND PLAN:  Tylomas bilaterally, blood blisters right first and second toes.  Diagnosis and treatment options discussed with the patient.  Prescription for Carmol lotion given and use discussed with him.  He will follow up with me as an outpatient as needed.         FARSHAD GARCIA DPM             D: 2017 12:16   T: 2017 12:32   MT: SK      Name:     MARIO LAMAR   MRN:      [id number]        Account:       [id number]   :      1965           Consult Date:  2017      Document: [id number]

## 2017-11-27 NOTE — CONSULTS
D) I reviewed patient's EMR for a LP screen. A) Pt has [facility] so will need a [facility] rule 25 for a tx referral. We will therefore not be seeing patient. P) Patient's CTC or  to arrange for rule 25 eval. Cricket Melendrez [facility], [facility]

## 2017-11-28 LAB
ALBUMIN SERPL-MCNC: 3.2 G/DL (ref 3.4–5)
ALP SERPL-CCNC: 160 U/L (ref 40–150)
ALT SERPL W P-5'-P-CCNC: 125 U/L (ref 0–70)
ANION GAP SERPL CALCULATED.3IONS-SCNC: 8 MMOL/L (ref 3–14)
AST SERPL W P-5'-P-CCNC: 218 U/L (ref 0–45)
BILIRUB DIRECT SERPL-MCNC: 0.7 MG/DL (ref 0–0.2)
BILIRUB SERPL-MCNC: 2 MG/DL (ref 0.2–1.3)
BUN SERPL-MCNC: 5 MG/DL (ref 7–30)
CALCIUM SERPL-MCNC: 8.4 MG/DL (ref 8.5–10.1)
CHLORIDE SERPL-SCNC: 100 MMOL/L (ref 94–109)
CO2 SERPL-SCNC: 25 MMOL/L (ref 20–32)
CREAT SERPL-MCNC: 0.63 MG/DL (ref 0.66–1.25)
ERYTHROCYTE [DISTWIDTH] IN BLOOD BY AUTOMATED COUNT: 14.6 % (ref 10–15)
GFR SERPL CREATININE-BSD FRML MDRD: >90 ML/MIN/1.7M2
GLUCOSE SERPL-MCNC: 91 MG/DL (ref 70–99)
HCT VFR BLD AUTO: 40.6 % (ref 40–53)
HGB BLD-MCNC: 14 G/DL (ref 13.3–17.7)
LIPASE SERPL-CCNC: 416 U/L (ref 73–393)
MAGNESIUM SERPL-MCNC: 1.8 MG/DL (ref 1.6–2.3)
MCH RBC QN AUTO: 31 PG (ref 26.5–33)
MCHC RBC AUTO-ENTMCNC: 34.5 G/DL (ref 31.5–36.5)
MCV RBC AUTO: 90 FL (ref 78–100)
PLATELET # BLD AUTO: 83 10E9/L (ref 150–450)
POTASSIUM SERPL-SCNC: 3.4 MMOL/L (ref 3.4–5.3)
PROT SERPL-MCNC: 7.7 G/DL (ref 6.8–8.8)
RBC # BLD AUTO: 4.51 10E12/L (ref 4.4–5.9)
SODIUM SERPL-SCNC: 133 MMOL/L (ref 133–144)
WBC # BLD AUTO: 5.8 10E9/L (ref 4–11)

## 2017-11-28 PROCEDURE — 83735 ASSAY OF MAGNESIUM: CPT | Performed by: INTERNAL MEDICINE

## 2017-11-28 PROCEDURE — 80048 BASIC METABOLIC PNL TOTAL CA: CPT | Performed by: INTERNAL MEDICINE

## 2017-11-28 PROCEDURE — 25000132 ZZH RX MED GY IP 250 OP 250 PS 637: Performed by: INTERNAL MEDICINE

## 2017-11-28 PROCEDURE — 80076 HEPATIC FUNCTION PANEL: CPT | Performed by: INTERNAL MEDICINE

## 2017-11-28 PROCEDURE — 25000125 ZZHC RX 250: Performed by: STUDENT IN AN ORGANIZED HEALTH CARE EDUCATION/TRAINING PROGRAM

## 2017-11-28 PROCEDURE — 25000132 ZZH RX MED GY IP 250 OP 250 PS 637: Performed by: STUDENT IN AN ORGANIZED HEALTH CARE EDUCATION/TRAINING PROGRAM

## 2017-11-28 PROCEDURE — 85027 COMPLETE CBC AUTOMATED: CPT | Performed by: INTERNAL MEDICINE

## 2017-11-28 PROCEDURE — 12000001 ZZH R&B MED SURG/OB UMMC

## 2017-11-28 PROCEDURE — 99232 SBSQ HOSP IP/OBS MODERATE 35: CPT | Performed by: INTERNAL MEDICINE

## 2017-11-28 PROCEDURE — 83690 ASSAY OF LIPASE: CPT | Performed by: INTERNAL MEDICINE

## 2017-11-28 PROCEDURE — 36415 COLL VENOUS BLD VENIPUNCTURE: CPT | Performed by: INTERNAL MEDICINE

## 2017-11-28 RX ADMIN — MIRTAZAPINE 15 MG: 15 TABLET, FILM COATED ORAL at 23:06

## 2017-11-28 RX ADMIN — PANTOPRAZOLE SODIUM 40 MG: 40 TABLET, DELAYED RELEASE ORAL at 09:10

## 2017-11-28 RX ADMIN — Medication 2 G: at 09:55

## 2017-11-28 RX ADMIN — FOLIC ACID 1 MG: 1 TABLET ORAL at 09:10

## 2017-11-28 RX ADMIN — Medication 100 MG: at 09:10

## 2017-11-28 RX ADMIN — DIAZEPAM 5 MG: 5 TABLET ORAL at 09:55

## 2017-11-28 RX ADMIN — ESCITALOPRAM OXALATE 10 MG: 5 TABLET, FILM COATED ORAL at 09:10

## 2017-11-28 RX ADMIN — UREA: 400 CREAM TOPICAL at 09:10

## 2017-11-28 RX ADMIN — DIAZEPAM 5 MG: 5 TABLET ORAL at 16:51

## 2017-11-28 RX ADMIN — MULTIPLE VITAMINS W/ MINERALS TAB 1 TABLET: TAB at 09:10

## 2017-11-28 RX ADMIN — DIAZEPAM 5 MG: 5 TABLET ORAL at 21:30

## 2017-11-28 RX ADMIN — POTASSIUM CHLORIDE 20 MEQ: 750 TABLET, FILM COATED, EXTENDED RELEASE ORAL at 09:10

## 2017-11-28 ASSESSMENT — ACTIVITIES OF DAILY LIVING (ADL)
ADLS_ACUITY_SCORE: 12

## 2017-11-28 NOTE — PLAN OF CARE
Pt slept well through night between cares. Pt had vitals checked and MSSA scoring, which was low. He did not require valium. Pt is SBA to bathroom, urinating adequate amounts. VSS. PIV SL. Continue to monitor.

## 2017-11-28 NOTE — PROGRESS NOTES
" Progress Note        Mario Lamar II [[id number]] (M) - 52 year old          Assessment and Plan:   Mario Lamar II is 52 year old male with a history of major depressive disorder, PSTD, and alcohol use disorder who presents with diaphoresis, shakiness, dizziness, and emesis consistent with alcohol withdrawal.      Alcohol addiction, dependence and in withdrawal symptoms now:   Continue on MSSA protocol with valium    keep on thiamine, folate, MVT   tolerating PO liquids and food, Dcd IVF    seen by psychiatry- not agreeing for inpatient treatment ,   SW to follow up on out patient sober house and resources for out patient treatment options   -continue on electrolyte replacement protocol     Alcoholic Hepatitis   Hepatic steatosis  Hepatic steatosis seen on abdominal US at [facility] in 8/2017.   Presents with elevated LFTs , AST>ALT consistent with alcoholic hepatitis    bilirubin peaked at 2.7 and trending down now  -- Continue to monitor     Mildly elevated lipase  Lipase 399 on admission. Peaked at 492 and trending down to 416  No concern for pancreatitis at this time.   Tolerating the diet   -- Continue to monitor    Depression  PTSD  Sleep difficulties with auditory hallucinations  Patient has not been taking his psychiatric medications for 1 month. Was previously on fluoxetine, atarax, melatonin, mirtazapine, prazosin, and risperidone.  Seen by psychiatry   advised to stop prazosin in view of elevated LFTs  Start lexapro 10 mg   Start Remeron 15 mg daily     Disposition:     possible discharge in 1-2 days     Subjective: reports not feeling well   Feels anxious  Reports abdominal pain , mild in rt. Side of abdomen   No fever or chills     Ros:  4-point ROS negative except in subjective/interval history.      Objective         Physical Exam:  /78 (BP Location: Left arm)  Pulse 91  Temp 100.6  F (38.1  C) (Oral)  Resp 16  Ht 1.74 m (5' 8.5\")  Wt 69.6 kg (153 lb 8 oz)  SpO2 95% "  BMI 23 kg/m2  General: No distress, cooperative, pleasant  HEENT:   Head: NC/AT   Eyes: Sclera white, conjunctiva pink.  PERRL. EOMI   OP: MM moist and pink. No erythema or exudates of posterior pharynx   Neck: Supple. No JVD or cervical lymphadenopathy.   Pulmonary: CTAB, normal respiratory effort. No wheezes, rales or rhonchi   Cardiovascular: RRR. S1 and S2 preset. No m/g/r.  Abdomen:BS+, soft, mild tenderness in Rt.Upper quadrant   Extremities: Warm and well perfused. No lower extremity edema.  Neuro: Alert and oriented x 3. Moving all extremities      LABS (Last four results)  CMP  Recent Labs  Lab 11/28/17  0531 11/27/17  0658 11/26/17  1726 11/26/17  1129    137  --  139   POTASSIUM 3.4 3.6 3.4 3.3*   CHLORIDE 100 102  --  99   CO2 25 25  --  20   ANIONGAP 8 10  --  20*   GLC 91 110*  --  71   BUN 5* 9  --  13   CR 0.63* 0.58*  --  0.56*   GFRESTIMATED >90 >90  --  >90   GFRESTBLACK >90 >90  --  >90   JOHN 8.4* 8.2*  --  8.5   MAG 1.8 1.9 2.1 1.2*   PHOS  --   --   --  2.6   PROTTOTAL 7.7 7.6  --  8.7   ALBUMIN 3.2* 3.1*  --  3.7   BILITOTAL 2.0* 2.7*  --  2.5*   ALKPHOS 160* 145  --  167*   * 144*  --  196*   * 96*  --  123*     CBC  Recent Labs  Lab 11/28/17  0531 11/27/17  0658 11/26/17  1129   WBC 5.8 4.7 6.0   RBC 4.51 4.39* 4.45   HGB 14.0 13.7 14.0   HCT 40.6 40.3 40.3   MCV 90 92 91   MCH 31.0 31.2 31.5   MCHC 34.5 34.0 34.7   RDW 14.6 15.0 15.1*   PLT 83* 84* 124*     INR  Recent Labs  Lab 11/26/17  1129   INR 1.08         Recent Labs  Lab 11/26/17  1925   CULT 10,000 to 50,000 colonies/mLmixed urogenital ariadna  Susceptibility testing not routinely done            Pedro Elena  Hospitalist   [facility]     11/28/2017

## 2017-11-28 NOTE — PLAN OF CARE
Problem: Patient Care Overview  Goal: Plan of Care/Patient Progress Review  Outcome: Improving        VS:   Temp slightly elevated 100.6 -MD updated   Output:   Voiding well without difficulty   Activity:   Up and ambulating SBA    Skin: Intact, has Callus on both feet ( followed by a podiatrist)  and a blood blister on his R great toe   Pain:   Denies pain   Neuro/CMS:   intact   Dressing(s):   na   Diet:   Regular   LDA:   PIV SL   Equipment:   na   Plan:   Would like to go back to sober house/apartment once medically cleared   Additional Info:   Patient continues to c/o slight abdominal pain( but tolerating regular diet) , Lipase & LFT's elevated.  K and Mg replaced this am, recheck carmen am. MSSA 9& 8, prn Valium per protocol.

## 2017-11-28 NOTE — PLAN OF CARE
Problem: Patient Care Overview  Goal: Plan of Care/Patient Progress Review  Outcome: Improving  Patient is A&O x 3, On Tele,  in SR poor appetite.Low grade temp,99.1 oral encouraged fluids and IS use.Had a shower.MSSA 10,Oral  Valium administered as per order /78, HR 88 Resp 16, SATs 95% at RA. PIV in Rt arm patent and SL. Will continue to monitor.

## 2017-11-29 LAB
MAGNESIUM SERPL-MCNC: 1.9 MG/DL (ref 1.6–2.3)
POTASSIUM SERPL-SCNC: 3.5 MMOL/L (ref 3.4–5.3)

## 2017-11-29 PROCEDURE — 25000132 ZZH RX MED GY IP 250 OP 250 PS 637: Performed by: INTERNAL MEDICINE

## 2017-11-29 PROCEDURE — 90686 IIV4 VACC NO PRSV 0.5 ML IM: CPT | Performed by: INTERNAL MEDICINE

## 2017-11-29 PROCEDURE — 25000128 H RX IP 250 OP 636: Performed by: INTERNAL MEDICINE

## 2017-11-29 PROCEDURE — 99232 SBSQ HOSP IP/OBS MODERATE 35: CPT | Performed by: INTERNAL MEDICINE

## 2017-11-29 PROCEDURE — 36415 COLL VENOUS BLD VENIPUNCTURE: CPT | Performed by: INTERNAL MEDICINE

## 2017-11-29 PROCEDURE — 25000132 ZZH RX MED GY IP 250 OP 250 PS 637: Performed by: STUDENT IN AN ORGANIZED HEALTH CARE EDUCATION/TRAINING PROGRAM

## 2017-11-29 PROCEDURE — 12000001 ZZH R&B MED SURG/OB UMMC

## 2017-11-29 PROCEDURE — 84132 ASSAY OF SERUM POTASSIUM: CPT | Performed by: INTERNAL MEDICINE

## 2017-11-29 PROCEDURE — 83735 ASSAY OF MAGNESIUM: CPT | Performed by: INTERNAL MEDICINE

## 2017-11-29 RX ORDER — MAGNESIUM OXIDE 400 MG/1
400 TABLET ORAL 2 TIMES DAILY
Status: DISCONTINUED | OUTPATIENT
Start: 2017-11-29 | End: 2017-12-01 | Stop reason: HOSPADM

## 2017-11-29 RX ADMIN — DIAZEPAM 5 MG: 5 TABLET ORAL at 09:21

## 2017-11-29 RX ADMIN — DIAZEPAM 5 MG: 5 TABLET ORAL at 03:52

## 2017-11-29 RX ADMIN — FOLIC ACID 1 MG: 1 TABLET ORAL at 09:22

## 2017-11-29 RX ADMIN — MIRTAZAPINE 15 MG: 15 TABLET, FILM COATED ORAL at 21:56

## 2017-11-29 RX ADMIN — ESCITALOPRAM OXALATE 10 MG: 5 TABLET, FILM COATED ORAL at 09:22

## 2017-11-29 RX ADMIN — UREA: 400 CREAM TOPICAL at 09:22

## 2017-11-29 RX ADMIN — MULTIPLE VITAMINS W/ MINERALS TAB 1 TABLET: TAB at 09:22

## 2017-11-29 RX ADMIN — INFLUENZA A VIRUS A/MICHIGAN/45/2015 X-275 (H1N1) ANTIGEN (FORMALDEHYDE INACTIVATED), INFLUENZA A VIRUS A/HONG KONG/4801/2014 X-263B (H3N2) ANTIGEN (FORMALDEHYDE INACTIVATED), INFLUENZA B VIRUS B/PHUKET/3073/2013 ANTIGEN (FORMALDEHYDE INACTIVATED), AND INFLUENZA B VIRUS B/BRISBANE/60/2008 ANTIGEN (FORMALDEHYDE INACTIVATED) 0.5 ML: 15; 15; 15; 15 INJECTION, SUSPENSION INTRAMUSCULAR at 10:53

## 2017-11-29 RX ADMIN — PANTOPRAZOLE SODIUM 40 MG: 40 TABLET, DELAYED RELEASE ORAL at 09:22

## 2017-11-29 RX ADMIN — POTASSIUM CHLORIDE 20 MEQ: 750 TABLET, FILM COATED, EXTENDED RELEASE ORAL at 09:22

## 2017-11-29 RX ADMIN — MAGNESIUM OXIDE TAB 400 MG (241.3 MG ELEMENTAL MG) 400 MG: 400 (241.3 MG) TAB at 20:36

## 2017-11-29 RX ADMIN — Medication 100 MG: at 09:22

## 2017-11-29 RX ADMIN — DIAZEPAM 5 MG: 5 TABLET ORAL at 20:43

## 2017-11-29 ASSESSMENT — ACTIVITIES OF DAILY LIVING (ADL)
ADLS_ACUITY_SCORE: 12

## 2017-11-29 NOTE — PLAN OF CARE
Problem: Patient Care Overview  Goal: Plan of Care/Patient Progress Review  Outcome: No Change        VS:   VSS.Afebrile.MSSA scored 11,given 5mg valium,rechecked MSSA for 6.LS clear.   Output:   Denies problems with both BM and bladder,LBM yesterday.     Activity:   Up ad thai.   Skin: Callus on feet.Blood blister R great toe.   Pain:   Abdominal pain tolerable.   Neuro/CMS:   No numbness/tingling.   Dressing(s):   none   Diet:   Regular.Denies nausea.   LDA:   R AC PIV line accidentally pulled out by pt.Pressure drsg applied   Equipment:   none   Plan:   Back to sober house/apt.   Additional Info:   Remeron given for sleep.K/MG to be rechecked in AM.

## 2017-11-29 NOTE — CONSULTS
"Social Work: Assessment with Discharge Plan    Patient Name:  Mario Lamar II  :  1965  Age:  52 year old  MRN:  [id number]  Risk/Complexity Score:  Filed Complexity Screen Score: 4  Completed assessment with:  Pt, Dr Elena    Presenting Information   Reason for Referral:  Discharge plan  Date of Intake:  2017  Referral Source:  Physician  Decision Maker:  pt  Alternate Decision Maker:  Not identified    Living Situation:  Homeless  Previous Functional Status:  Independent  Patient and family understanding of hospitalization:  Seeking hospitalization \"because I was dizzy and not feeling well. And the smell of Vodka made me sick. I know I need to stop drinking.\"  Cultural/Language/Spiritual Considerations:  , homeless, HX of mental health (most recent hospitalization 2017 for mental health SI issues).  Adjustment to Illness:  Accepting, hopeful    Physical Health  Reason for Admission:    1. Alcohol withdrawal syndrome without complication (H)    2. Hypomagnesemia    3. Hypokalemia    4. Alcohol withdrawal syndrome with complication (H)      Services Needed/Recommended:  Other:  stable housing    Mental Health/Chemical Dependency  Diagnosis:  Depression, alcohol abuse disorder, severe  Support/Services in Place:  Pt has case workers [facility].  Services Needed/Recommended:  Stable housing    Support System  Significant relationship at present time:  None identified other than  through [employer]  Family of origin is available for support:  Not identified  Other support available:  Pt identifies having community support  Gaps in support system:  Stable housing, sober support  Patient is caregiver to:  None     Provider Information   Primary Care Physician:  Elle Aguilar   [phone]   Clinic:  [facility] [address] [facility] *      :  Jatin Mendez (w/[facility] " [phone])    Financial   Income Source:  [place]  Financial Concerns:  None noted  Insurance:    Payor/Plan Subscriber Name Rel Member # Group #   [facility]  [id number] [address], [address]       Discharge Plan   Patient and family discharge goal:  Pt is hoping to return to [facility] (temporary shelter) or work with [facility] in finding stable housing through the [facility]  Provided education on discharge plan:  YES  Patient agreeable to discharge plan:  YES  A list of Medicare Certified Facilities was provided to the patient and/or family to encourage patient choice. Patient's choices for facility are:  NA  Will NH provide Skilled rehabilitation or complex medical:  NA  General information regarding anticipated insurance coverage and possible out of pocket cost was discussed. Patient and patient's family are aware patient may incur the cost of transportation to the facility, pending insurance payment: NA  Barriers to discharge:   Medical stability    Discharge Recommendations   Anticipated Disposition:  [facility] or Intake Meeting w/ [facility] on 12/1 @ 10am  Transportation Needs:  Other:  Jatin can provide  Name of Transportation Company and Phone:  Jatin ( through [facility])    Additional comments   Writer introduced role, reason for visit. Pt has been working with [facility] outreach to obtain housing. He said he has an appointment with his  on Thursday.  He provided consent for writer to contact Jatin and discuss upcoming appointments.     Per Jatin, pt is scheduled for an intake appointment with the [facility] (stable housing) on Friday, 12/1 @ 10am. It would be best if pt could be DC'd from hospital to make this appointment.  Writer attempted to update pt, but he was sleeping and did not waken to writer's knock or voice. Dr Elena paged w/update. SW con't to follow per request/referral

## 2017-11-29 NOTE — PROGRESS NOTES
" Progress Note        Mario Lamar II [[id number]] (M) - 52 year old          Assessment and Plan:   Mario Lamar II is 52 year old male with a history of major depressive disorder, PSTD, and alcohol use disorder who presents with diaphoresis, shakiness, dizziness, and emesis consistent with alcohol withdrawal.      Alcohol addiction, dependence and in withdrawal symptoms now:   Continue on MSSA protocol with valium   MSSA scores 8-11   keep on thiamine, folate, MVT   tolerating PO liquids and food, Dcd IVF    seen by psychiatry- not agreeing for inpatient treatment ,   SW to follow up on out patient sober house and resources for out patient treatment options   -continue on electrolyte replacement protocol     Alcoholic Hepatitis   Hepatic steatosis  Hepatic steatosis seen on abdominal US at [facility] in 8/2017.   Presents with elevated LFTs , AST>ALT consistent with alcoholic hepatitis    bilirubin peaked at 2.7 and trending down now       Mildly elevated lipase  Due to alcohol use, not an acute pancreatitis   Tolerating diet with out any problems    Depression  PTSD  Sleep difficulties with auditory hallucinations- resolving  Patient has not been taking his psychiatric medications for 1 month. Was previously on fluoxetine, atarax, melatonin, mirtazapine, prazosin, and risperidone.  Seen by psychiatry   advised to stop prazosin in view of elevated LFTs  Started on   lexapro 10 mg   Started on  Remeron 15 mg daily     Disposition:     possible discharge in 1-2 days     Subjective:  Still withdrawing form alcohol, feels anxious   Says he has to go to [facility] tomorrow for his housing appointment   No fever or chills     Ros:  4-point ROS negative except in subjective/interval history.      Objective         Physical Exam:  /77 (BP Location: Left arm)  Pulse 82  Temp 98.8  F (37.1  C) (Oral)  Resp 16  Ht 1.74 m (5' 8.5\")  Wt 69.6 kg (153 lb 8 oz)  SpO2 99%  BMI 23 kg/m2  General: No " distress, cooperative, pleasant  HEENT:atraumatic, GARO   Pulmonary: good air entry , normal respiratory effort   Cardiovascular:regular , no murmurs   Abdomen:BS+, soft,  Extremities: Warm and well perfused. No lower extremity edema.  Neuro: Alert and oriented x 3. mild tremor in extremity , unsteady gait , still need support      LABS (Last four results)  CMP    Recent Labs  Lab 11/29/17  0530 11/28/17  0531 11/27/17  0658 11/26/17  1726 11/26/17  1129   NA  --  133 137  --  139   POTASSIUM 3.5 3.4 3.6 3.4 3.3*   CHLORIDE  --  100 102  --  99   CO2  --  25 25  --  20   ANIONGAP  --  8 10  --  20*   GLC  --  91 110*  --  71   BUN  --  5* 9  --  13   CR  --  0.63* 0.58*  --  0.56*   GFRESTIMATED  --  >90 >90  --  >90   GFRESTBLACK  --  >90 >90  --  >90   JOHN  --  8.4* 8.2*  --  8.5   MAG 1.9 1.8 1.9 2.1 1.2*   PHOS  --   --   --   --  2.6   PROTTOTAL  --  7.7 7.6  --  8.7   ALBUMIN  --  3.2* 3.1*  --  3.7   BILITOTAL  --  2.0* 2.7*  --  2.5*   ALKPHOS  --  160* 145  --  167*   AST  --  218* 144*  --  196*   ALT  --  125* 96*  --  123*     CBC    Recent Labs  Lab 11/28/17  0531 11/27/17  0658 11/26/17  1129   WBC 5.8 4.7 6.0   RBC 4.51 4.39* 4.45   HGB 14.0 13.7 14.0   HCT 40.6 40.3 40.3   MCV 90 92 91   MCH 31.0 31.2 31.5   MCHC 34.5 34.0 34.7   RDW 14.6 15.0 15.1*   PLT 83* 84* 124*     INR    Recent Labs  Lab 11/26/17  1129   INR 1.08         Recent Labs  Lab 11/26/17  1925   CULT 10,000 to 50,000 colonies/mLmixed urogenital ariadna  Susceptibility testing not routinely done            Pedro Santiagoist   [facility]

## 2017-11-29 NOTE — PLAN OF CARE
Problem: Patient Care Overview  Goal: Plan of Care/Patient Progress Review  Outcome: Improving  VS: VSS   Output: Voiding well without difficulty   Activity: Up and ambulating SBA with a waker, unsteady d/t being shaky   Skin: Intact, has Callus on both feet ( followed by a podiatrist)  and a blood blister on his R great toe   Pain: Denies pain   Neuro/CMS: intact   Dressing(s): na   Diet: Regular   LDA: none   Equipment: Walker   Plan: Would like to go back to sober house/apartment once medically cleared   Additional Info: MSSA score 8 & 2, prn Valium given. K 3.5-replaced this shift, Mg 1.9- will be oral replacement this PM. Tele- NSR

## 2017-11-30 ENCOUNTER — APPOINTMENT (OUTPATIENT)
Dept: PHYSICAL THERAPY | Facility: CLINIC | Age: 52
DRG: 897 | End: 2017-11-30
Attending: INTERNAL MEDICINE
Payer: COMMERCIAL

## 2017-11-30 LAB
ANION GAP SERPL CALCULATED.3IONS-SCNC: 10 MMOL/L (ref 3–14)
BUN SERPL-MCNC: 13 MG/DL (ref 7–30)
CALCIUM SERPL-MCNC: 9.3 MG/DL (ref 8.5–10.1)
CHLORIDE SERPL-SCNC: 101 MMOL/L (ref 94–109)
CO2 SERPL-SCNC: 25 MMOL/L (ref 20–32)
CREAT SERPL-MCNC: 0.55 MG/DL (ref 0.66–1.25)
GFR SERPL CREATININE-BSD FRML MDRD: >90 ML/MIN/1.7M2
GLUCOSE SERPL-MCNC: 110 MG/DL (ref 70–99)
MAGNESIUM SERPL-MCNC: 2 MG/DL (ref 1.6–2.3)
POTASSIUM SERPL-SCNC: 3.8 MMOL/L (ref 3.4–5.3)
SODIUM SERPL-SCNC: 136 MMOL/L (ref 133–144)

## 2017-11-30 PROCEDURE — 83735 ASSAY OF MAGNESIUM: CPT | Performed by: INTERNAL MEDICINE

## 2017-11-30 PROCEDURE — 97530 THERAPEUTIC ACTIVITIES: CPT | Mod: GP

## 2017-11-30 PROCEDURE — 25000132 ZZH RX MED GY IP 250 OP 250 PS 637: Performed by: INTERNAL MEDICINE

## 2017-11-30 PROCEDURE — 40000193 ZZH STATISTIC PT WARD VISIT

## 2017-11-30 PROCEDURE — 25000132 ZZH RX MED GY IP 250 OP 250 PS 637: Performed by: STUDENT IN AN ORGANIZED HEALTH CARE EDUCATION/TRAINING PROGRAM

## 2017-11-30 PROCEDURE — 97161 PT EVAL LOW COMPLEX 20 MIN: CPT | Mod: GP

## 2017-11-30 PROCEDURE — 36415 COLL VENOUS BLD VENIPUNCTURE: CPT | Performed by: INTERNAL MEDICINE

## 2017-11-30 PROCEDURE — 99232 SBSQ HOSP IP/OBS MODERATE 35: CPT | Performed by: INTERNAL MEDICINE

## 2017-11-30 PROCEDURE — 80048 BASIC METABOLIC PNL TOTAL CA: CPT | Performed by: INTERNAL MEDICINE

## 2017-11-30 PROCEDURE — 12000001 ZZH R&B MED SURG/OB UMMC

## 2017-11-30 PROCEDURE — 97110 THERAPEUTIC EXERCISES: CPT | Mod: GP

## 2017-11-30 RX ORDER — CLONIDINE HYDROCHLORIDE 0.1 MG/1
0.1 TABLET ORAL EVERY MORNING
Status: DISCONTINUED | OUTPATIENT
Start: 2017-11-30 | End: 2017-12-01 | Stop reason: HOSPADM

## 2017-11-30 RX ORDER — CALCIUM CARBONATE 500 MG/1
1000 TABLET, CHEWABLE ORAL 3 TIMES DAILY PRN
Status: DISCONTINUED | OUTPATIENT
Start: 2017-11-30 | End: 2017-12-01 | Stop reason: HOSPADM

## 2017-11-30 RX ADMIN — POTASSIUM CHLORIDE 20 MEQ: 750 TABLET, FILM COATED, EXTENDED RELEASE ORAL at 12:38

## 2017-11-30 RX ADMIN — MULTIPLE VITAMINS W/ MINERALS TAB 1 TABLET: TAB at 08:52

## 2017-11-30 RX ADMIN — MAGNESIUM OXIDE TAB 400 MG (241.3 MG ELEMENTAL MG) 400 MG: 400 (241.3 MG) TAB at 21:33

## 2017-11-30 RX ADMIN — PANTOPRAZOLE SODIUM 40 MG: 40 TABLET, DELAYED RELEASE ORAL at 08:52

## 2017-11-30 RX ADMIN — MIRTAZAPINE 15 MG: 15 TABLET, FILM COATED ORAL at 21:33

## 2017-11-30 RX ADMIN — UREA: 400 CREAM TOPICAL at 08:56

## 2017-11-30 RX ADMIN — MAGNESIUM OXIDE TAB 400 MG (241.3 MG ELEMENTAL MG) 400 MG: 400 (241.3 MG) TAB at 08:52

## 2017-11-30 RX ADMIN — FOLIC ACID 1 MG: 1 TABLET ORAL at 08:52

## 2017-11-30 RX ADMIN — ESCITALOPRAM OXALATE 10 MG: 5 TABLET, FILM COATED ORAL at 08:51

## 2017-11-30 RX ADMIN — CLONIDINE HYDROCHLORIDE 0.1 MG: 0.1 TABLET ORAL at 13:50

## 2017-11-30 ASSESSMENT — ACTIVITIES OF DAILY LIVING (ADL)
ADLS_ACUITY_SCORE: 11
ADLS_ACUITY_SCORE: 12
ADLS_ACUITY_SCORE: 12
ADLS_ACUITY_SCORE: 11
ADLS_ACUITY_SCORE: 12
ADLS_ACUITY_SCORE: 12

## 2017-11-30 NOTE — PLAN OF CARE
Problem: Patient Care Overview  Goal: Plan of Care/Patient Progress Review  Discharge Planner PT   Patient plan for discharge: Not expressed  Current status: Patient demonstrates independence with bed mobility. Needing SBA - CGA for transfers and CGA - Magda for safety with gait using FWW. Patient demos intermittent losses of balance during gait. Engaged in ambulation using FWW for 300 feet and seated and standing LE strengthening exercises.   Barriers to return to prior living situation: Fall risk  Recommendations for discharge: Patient would likely be safe to dc to shelter, may benefit from walker for safety during ambulation although unclear if pt would be able to have one (homeless) and would likely not use it, per report  Rationale for recommendations: Pt overall safe with activity, demonstrates good strength, occasional losses of balance self-corrected. Likely safe to dc to shelter       Entered by: Erika Cuenca 11/30/2017 10:08 AM

## 2017-11-30 NOTE — PLAN OF CARE
Problem: Patient Care Overview  Goal: Individualization & Mutuality  Patient A&O x4, lungs sound clear, Bowel sound active, Denied CP, lightheadedness, dizziness, numbness and  tingling, drinking well and voiding  without difficulties, able to wiggle toes and fingers, CMS intact. Pt. reported a little shortness of breath and anxiety around 1pm, O2 sat checked was 98 at room air, MD notified  cloNIDine ordered and administered after that patient reported doing okay and lying in bed. repositioned and turned in bed independently,  demonstrates the ability to use call light appropriately, will be a plan to discharge tomorrow, will continue to monitor patient as POC.

## 2017-11-30 NOTE — PLAN OF CARE
Patient is Alert and oriented X4. MSSA score was 7 at 1648 and 9 at 2038, 5 mg of valium given. Temp max was 99.1 degrees and IS encouraged. Lung sounds clear. On Tele. Bowel sounds active. Denies pain, chest pain, shortness of breath. Pt is able to make needs known and call light remained within reach for the duration of the shift. No signs of pain or discomfort upon frequent rounding. Continue POC.

## 2017-11-30 NOTE — PLAN OF CARE
Problem: Patient Care Overview  Goal: Plan of Care/Patient Progress Review  VS: Pt A&Ox 4. VSS. MSSA score of 4 overnight. Incentive spirometer used while awake.   Output: Voids spontaneously without difficulty in the urinal or bathroom. Pt LBM 11/29 and is passing gas.   Activity: Pt up with standby assist. Independent with bed mobility.    Skin: Intact.    Pain: Denies.     CMS: CMS and Neuro's are intact. Patient denies numbness and tingling in all extremities. PCD's refused by patient.    Dressing: None.    Diet:  Is on regular diet. Denies nausea.    LDA:   None.    Sleep: Normal.    Plan: Continue to monitor pt and anticipate discharge to [facility] 11/30, [place] to help find stable housing per social work note.

## 2017-11-30 NOTE — PROGRESS NOTES
11/30/17 1013   Quick Adds   Type of Visit Initial PT Evaluation   Living Environment   Living Arrangements homeless   Living Environment Comment Pt is homeless. Reports does not access stairs in community.    Self-Care   Usual Activity Tolerance moderate   Current Activity Tolerance fair   Regular Exercise no   Equipment Currently Used at Home none   Activity/Exercise/Self-Care Comment Indep with self cares   Functional Level Prior   Ambulation 0-->independent   Transferring 0-->independent   Toileting 0-->independent   Bathing 0-->independent   Dressing 0-->independent   Eating 0-->independent   Communication 0-->understands/communicates without difficulty   Swallowing 0-->swallows foods/liquids without difficulty   Cognition 0 - no cognition issues reported   Fall history within last six months yes   Number of times patient has fallen within last six months 5   Which of the above functional risks had a recent onset or change? none   Prior Functional Level Comment Indep functional mobility, does not use assistive device. Falls as result of losses of balance secondary to alcohol use, per patient.   General Information   Onset of Illness/Injury or Date of Surgery - Date 11/26/17   Referring Physician Pedro Elena MD   Patient/Family Goals Statement None expressed   Pertinent History of Current Problem (include personal factors and/or comorbidities that impact the POC) Mario Lamar II is 52 year old male with a history of major depressive disorder, PSTD, and alcohol use disorder who presents with diaphoresis, shakiness, dizziness, and emesis consistent with alcohol withdrawal.   Precautions/Limitations fall precautions   General Info Comments Activity: up with assist   Cognitive Status Examination   Orientation orientation to person, place and time   Level of Consciousness alert   Follows Commands and Answers Questions 100% of the time;able to follow multistep instructions   Personal Safety and  Judgment intact   Memory intact   Cognitive Comment No concerns for cognitive impairments during this evaluation. Pt alert and oriented x4.   Pain Assessment   Patient Currently in Pain No   Integumentary/Edema   Integumentary/Edema no deficits were identifed   Posture    Posture Forward head position   Posture Comments Able to correct forward head position but reverts back with fatigue    Range of Motion (ROM)   ROM Quick Adds No deficits were identified   Strength   Strength Comments Demonstrates 4/5 strength in B LE grossly assessed seated EOB at hip, knee, ankle   Bed Mobility   Bed Mobility Comments Independent   Transfer Skills   Transfer Comments CGA for safety during sit to stands, no physical assist for transfer needed but patient with losses of balance upon standing req CGA   Gait   Gait Comments Magda using FWW; pt with intermittent losses of balance mostly to right (away from therapist); Magda to correct   Balance   Balance Comments Demonstrates dynamic balance deficits during gait as evidenced by losses of balance to right intermittently   Sensory Examination   Sensory Perception no deficits were identified   Coordination   Coordination Comments Not formally tested   Muscle Tone   Muscle Tone no deficits were identified   General Therapy Interventions   Planned Therapy Interventions balance training;gait training;strengthening;risk factor education;home program guidelines;progressive activity/exercise;transfer training   Clinical Impression   Criteria for Skilled Therapeutic Intervention yes, treatment indicated   PT Diagnosis Impaired functional mobility   Influenced by the following impairments Weakness, impaired gait, impaired balance, decreased activity tolerance   Functional limitations due to impairments Decreased independence and impaired safety with functional mobility   Clinical Presentation Stable/Uncomplicated   Clinical Presentation Rationale Stable medical conditon and clinical presentation.  "No significant PMH affecting PT POC   Clinical Decision Making (Complexity) Low complexity   Therapy Frequency` daily   Predicted Duration of Therapy Intervention (days/wks) 2 days   Anticipated Equipment Needs at Discharge front wheeled walker   Anticipated Discharge Disposition Other (see comments)  (shelter)   Risk & Benefits of therapy have been explained Yes   Patient, Family & other staff in agreement with plan of care Yes   Clinical Impression Comments Patient is presenting with static and dynamic balance deficits affecting safety with gait. Pt would benefit from skilled PT for balance, strengthening, and activity tolerance to increase indep and safety with mobility.   [facility] AM-PAC TM \"6 Clicks\"   2016, Trustees of [facility], under license to [employer].  All rights reserved.   6 Clicks Short Forms Basic Mobility Inpatient Short Form   [facility] AM-PAC  \"6 Clicks\" V.2 Basic Mobility Inpatient Short Form   1. Turning from your back to your side while in a flat bed without using bedrails? 4 - None   2. Moving from lying on your back to sitting on the side of a flat bed without using bedrails? 4 - None   3. Moving to and from a bed to a chair (including a wheelchair)? 3 - A Little   4. Standing up from a chair using your arms (e.g., wheelchair, or bedside chair)? 3 - A Little   5. To walk in hospital room? 3 - A Little   6. Climbing 3-5 steps with a railing? 3 - A Little   Basic Mobility Raw Score (Score out of 24.Lower scores equate to lower levels of function) 20   Total Evaluation Time   Total Evaluation Time (Minutes) 8     "

## 2017-11-30 NOTE — PROGRESS NOTES
" Progress Note        Mario Lamar II [[id number]] (M) - 52 year old          Assessment and Plan:   Mario Lamar II is 52 year old male with a history of major depressive disorder, PSTD, and alcohol use disorder who presents with diaphoresis, shakiness, dizziness, and emesis consistent with alcohol withdrawal.      Alcohol addiction, dependence and in withdrawal symptoms now:   Continue on MSSA protocol with valium   MSSA scores 9-5 , received few doses of valium yesterday.   keep on thiamine, folate, MVT   tolerating PO liquids and food, Dcd IVF    seen by psychiatry- not agreeing for inpatient treatment ,   SW to follow up on out patient sober house and resources for out patient treatment options   -continue on electrolyte replacement protocol - stable now     Alcoholic Hepatitis   Hepatic steatosis  Hepatic steatosis seen on abdominal US at [facility] in 8/2017.   Presents with elevated LFTs , AST>ALT consistent with alcoholic hepatitis    bilirubin peaked at 2.7 and trending down now       Mildly elevated lipase 2/2 alcohol use   Due to alcohol use, not an acute pancreatitis   Tolerating diet with out any problems    Depression  PTSD  Sleep difficulties with auditory hallucinations- resolving  Patient has not been taking his psychiatric medications for 1 month. Was previously on fluoxetine, atarax, melatonin, mirtazapine, prazosin, and risperidone.  Seen by psychiatry   advised to stop prazosin in view of elevated LFTs  Started on   lexapro 10 mg   Started on  Remeron 15 mg daily     Disposition:     possible discharge tomorrow     Subjective:  Still withdrawing form alcohol   Tolerating diet   Ambulating with PT     Ros:  4-point ROS negative except in subjective/interval history.      Objective         Physical Exam:  /74 (BP Location: Right arm)  Pulse 80  Temp 98.7  F (37.1  C) (Oral)  Resp 12  Ht 1.74 m (5' 8.5\")  Wt 68 kg (150 lb)  SpO2 99%  BMI 22.48 kg/m2    General: No " distress, cooperative, pleasant  HEENT:atraumatic, GARO   Pulmonary: good air entry , normal respiratory effort   Cardiovascular:regular , no murmurs   Abdomen:BS+, soft,  Extremities: Warm and well perfused. No lower extremity edema.  Neuro: Alert and oriented x 3. mild tremor in extremity , unsteady gait , still need support      LABS (Last four results)  CMP    Recent Labs  Lab 11/30/17  0815 11/29/17  0530 11/28/17  0531 11/27/17  0658  11/26/17  1129     --  133 137  --  139   POTASSIUM 3.8 3.5 3.4 3.6  < > 3.3*   CHLORIDE 101  --  100 102  --  99   CO2 25  --  25 25  --  20   ANIONGAP 10  --  8 10  --  20*   *  --  91 110*  --  71   BUN 13  --  5* 9  --  13   CR 0.55*  --  0.63* 0.58*  --  0.56*   GFRESTIMATED >90  --  >90 >90  --  >90   GFRESTBLACK >90  --  >90 >90  --  >90   JOHN 9.3  --  8.4* 8.2*  --  8.5   MAG 2.0 1.9 1.8 1.9  < > 1.2*   PHOS  --   --   --   --   --  2.6   PROTTOTAL  --   --  7.7 7.6  --  8.7   ALBUMIN  --   --  3.2* 3.1*  --  3.7   BILITOTAL  --   --  2.0* 2.7*  --  2.5*   ALKPHOS  --   --  160* 145  --  167*   AST  --   --  218* 144*  --  196*   ALT  --   --  125* 96*  --  123*   < > = values in this interval not displayed.  CBC    Recent Labs  Lab 11/28/17  0531 11/27/17  0658 11/26/17  1129   WBC 5.8 4.7 6.0   RBC 4.51 4.39* 4.45   HGB 14.0 13.7 14.0   HCT 40.6 40.3 40.3   MCV 90 92 91   MCH 31.0 31.2 31.5   MCHC 34.5 34.0 34.7   RDW 14.6 15.0 15.1*   PLT 83* 84* 124*     INR    Recent Labs  Lab 11/26/17  1129   INR 1.08         Recent Labs  Lab 11/26/17  1925   CULT 10,000 to 50,000 colonies/mLmixed urogenital ariadna  Susceptibility testing not routinely done            Pedro Elena  Hospitalist   [facility]

## 2017-11-30 NOTE — PLAN OF CARE
Problem: Patient Care Overview  Goal: Plan of Care/Patient Progress Review  Outcome: No Change  VS: VSS   Output: Voiding well without difficulty   Activity: Up and ambulating SBA with a waker   Skin: Intact, has Callus on both feet ( followed by a podiatrist)  and a blood blister on his R great toe   Pain: Denies pain   Neuro/CMS: intact   Dressing(s): na   Diet: Regular   LDA: none   Equipment: Walker   Plan: Sober house once medically cleared   Additional Info: Patient c/o abdominal pain/discomfort, prn Tums given with relief, tolerating regular diet, no nausea or vomiting reported.

## 2017-12-01 VITALS
HEIGHT: 69 IN | TEMPERATURE: 96.1 F | RESPIRATION RATE: 16 BRPM | OXYGEN SATURATION: 98 % | SYSTOLIC BLOOD PRESSURE: 118 MMHG | WEIGHT: 151.4 LBS | HEART RATE: 92 BPM | DIASTOLIC BLOOD PRESSURE: 82 MMHG | BODY MASS INDEX: 22.42 KG/M2

## 2017-12-01 LAB — POTASSIUM SERPL-SCNC: 4.3 MMOL/L (ref 3.4–5.3)

## 2017-12-01 PROCEDURE — 25000132 ZZH RX MED GY IP 250 OP 250 PS 637: Performed by: INTERNAL MEDICINE

## 2017-12-01 PROCEDURE — 99239 HOSP IP/OBS DSCHRG MGMT >30: CPT | Performed by: INTERNAL MEDICINE

## 2017-12-01 PROCEDURE — 84132 ASSAY OF SERUM POTASSIUM: CPT | Performed by: INTERNAL MEDICINE

## 2017-12-01 PROCEDURE — 36415 COLL VENOUS BLD VENIPUNCTURE: CPT | Performed by: INTERNAL MEDICINE

## 2017-12-01 PROCEDURE — 25000132 ZZH RX MED GY IP 250 OP 250 PS 637: Performed by: STUDENT IN AN ORGANIZED HEALTH CARE EDUCATION/TRAINING PROGRAM

## 2017-12-01 RX ORDER — ESCITALOPRAM OXALATE 10 MG/1
10 TABLET ORAL DAILY
Qty: 60 TABLET | Refills: 0 | Status: SHIPPED | OUTPATIENT
Start: 2017-12-02 | End: 2018-01-31

## 2017-12-01 RX ORDER — HYDROXYZINE HYDROCHLORIDE 25 MG/1
25 TABLET, FILM COATED ORAL 3 TIMES DAILY
Qty: 30 TABLET | Refills: 0 | Status: SHIPPED | OUTPATIENT
Start: 2017-12-01 | End: 2018-01-31

## 2017-12-01 RX ORDER — MIRTAZAPINE 15 MG/1
15 TABLET, FILM COATED ORAL AT BEDTIME
Qty: 90 TABLET | Refills: 1 | Status: SHIPPED | OUTPATIENT
Start: 2017-12-01 | End: 2018-01-31

## 2017-12-01 RX ADMIN — ESCITALOPRAM OXALATE 10 MG: 5 TABLET, FILM COATED ORAL at 08:54

## 2017-12-01 RX ADMIN — FOLIC ACID 1 MG: 1 TABLET ORAL at 08:54

## 2017-12-01 RX ADMIN — PANTOPRAZOLE SODIUM 40 MG: 40 TABLET, DELAYED RELEASE ORAL at 08:54

## 2017-12-01 RX ADMIN — MAGNESIUM OXIDE TAB 400 MG (241.3 MG ELEMENTAL MG) 400 MG: 400 (241.3 MG) TAB at 08:54

## 2017-12-01 RX ADMIN — CLONIDINE HYDROCHLORIDE 0.1 MG: 0.1 TABLET ORAL at 08:54

## 2017-12-01 RX ADMIN — MULTIPLE VITAMINS W/ MINERALS TAB 1 TABLET: TAB at 08:54

## 2017-12-01 ASSESSMENT — ACTIVITIES OF DAILY LIVING (ADL)
ADLS_ACUITY_SCORE: 11

## 2017-12-01 NOTE — DISCHARGE SUMMARY
Discharge Summary    Mario Lamar II MRN# [id number]   YOB: 1965 Age: 52 year old     Date of Admission:  11/26/2017  Date of Discharge:  12/1/2017  Admitting Physician:  Mathew Robb MD  Discharge Physician:  Mathew Robb MD  Discharging Service:  Internal Medicine     Primary Provider: Elle Aguilar          Discharge Diagnosis:     Alcohol withdrawal (H)    Depression                Discharge Disposition:   Discharged to home           Condition on Discharge:   Discharge condition: Stable   Code status on discharge: Full Code           Procedures:   No procedures performed during this admission          Discharge Medications:     Current Discharge Medication List      START taking these medications    Details   escitalopram (LEXAPRO) 10 MG tablet Take 1 tablet (10 mg) by mouth daily  Qty: 60 tablet, Refills: 0    Associated Diagnoses: Depression with suicidal ideation         CONTINUE these medications which have CHANGED    Details   hydrOXYzine (ATARAX) 25 MG tablet Take 1 tablet (25 mg) by mouth 3 times daily  Qty: 30 tablet, Refills: 0    Associated Diagnoses: Alcohol withdrawal syndrome without complication (H)      mirtazapine (REMERON) 15 MG tablet Take 1 tablet (15 mg) by mouth At Bedtime  Qty: 90 tablet, Refills: 1    Associated Diagnoses: Depression with suicidal ideation         CONTINUE these medications which have NOT CHANGED    Details   acetaminophen (TYLENOL) 325 MG tablet Take 650 mg by mouth 3 times daily as needed       cholecalciferol 1000 UNITS TABS Take 2,000 Units by mouth daily      melatonin 1 MG TABS tablet Take 1 mg by mouth nightly as needed for sleep      Multiple Vitamin (THERAPEUTIC MULTIVITAMIN PO) Take 1 tablet by mouth daily         STOP taking these medications       FLUoxetine (PROZAC) 10 MG capsule Comments:   Reason for Stopping:         magnesium hydroxide (MILK OF MAGNESIA) 400 MG/5ML suspension Comments:   Reason for Stopping:          PANTOPRAZOLE SODIUM PO Comments:   Reason for Stopping:         prazosin (MINIPRESS) 2 MG capsule Comments:   Reason for Stopping:         risperiDONE (RISPERDAL) 2 MG tablet Comments:   Reason for Stopping:         sennosides (SENOKOT) 8.6 MG tablet Comments:   Reason for Stopping:         augmented betamethasone dipropionate (DIPROLENE-AF) 0.05 % ointment Comments:   Reason for Stopping:                     Consultations:   Consultation during this admission received from psychiatry              Brief History of Illness:   Mario Lamar II is 52 year old male with a history of major depressive disorder, PSTD, and alcohol use disorder who presents with diaphoresis, shakiness, dizziness, and emesis consistent with alcohol withdrawal.          Hospital Course:   Alcohol addiction, dependence and in withdrawal symptoms now:   Continue on MSSA protocol with valium   Did not receive any valium in last 24h  Not actively withdrawing anymore  Stable for discharge   He will be going for his housing appointment today at 10 AM  Advised to not to drink any more   Advised close follow up with PCP and case workers for CD eval and rule 25 paperwork as outpatient.   he said he has walker at home , and advised him to use as needed.  Seen by PT - thought safe to go home   Alcoholic Hepatitis   Hepatic steatosis- resolving   Hepatic steatosis seen on abdominal US at [facility] in 8/2017.   Presents with elevated LFTs , AST>ALT consistent with alcoholic hepatitis            Mildly elevated lipase 2/2 alcohol use - asymptomatic        Depression  PTSD  Sleep difficulties with auditory hallucinations- resolved   Patient has not been taking his psychiatric medications for 1 month. Was previously on fluoxetine, atarax, melatonin, mirtazapine, prazosin, and risperidone.  Seen by psychiatry   advised to stop prazosin in view of elevated LFTs  Started on   lexapro 10 mg   Started on  Remeron 15 mg daily   He will need to follow  "up with psychiatrist in 2-4 weeks to adjust the medications             Final Day of Progress before Discharge:       Physical Exam:  Blood pressure 118/82, pulse 92, temperature 96.1  F (35.6  C), temperature source Oral, resp. rate 16, height 1.74 m (5' 8.5\"), weight 68.7 kg (151 lb 6.4 oz), SpO2 98 %.  General: No distress, cooperative, pleasant  HEENT:atraumatic, GAOR   Pulmonary: good air entry , normal respiratory effort   Cardiovascular:regular , no murmurs   Abdomen:BS+, soft,  Extremities: Warm and well perfused. No lower extremity edema.  Neuro: Alert and oriented x 3.          Data:  All laboratory data reviewed             Significant Results:   No results found for this or any previous visit (from the past 48 hour(s)).             Pending Results:   Unresulted Labs Ordered in the Past 30 Days of this Admission     No orders found from 9/27/2017 to 11/27/2017.                  Discharge Instructions and Follow-Up:     Discharge Procedure Orders  Reason for your hospital stay   Order Comments: Alcohol withdrawal     Adult [facility] Follow-up and recommended labs and tests   Order Comments: Follow up with primary care provider, Elle Aguilar, within 7 days for hospital follow- up.  No follow up labs or test are needed.      Appointments on [place] and/or [facility] (with [facility] or [facility] provider or service). Call [phone] if you haven't heard regarding these appointments within 7 days of discharge.     Activity   Order Comments: Your activity upon discharge: activity as tolerated   Order Specific Question Answer Comments   Is discharge order? Yes      Full Code     Diet   Order Comments: Follow this diet upon discharge: Regular   Order Specific Question Answer Comments   Is discharge order? Yes             Attestation:  Pedro Elena.    Time spent on patient: 35 minutes total including face to face and coordinating care time reviewing current illness, any medication changes, and the care plan " for today.

## 2017-12-01 NOTE — PLAN OF CARE
Problem: Patient Care Overview  Goal: Plan of Care/Patient Progress Review  Outcome: Adequate for Discharge Date Met: 12/01/17  VSS. A/O x4. Up indep, sometimes uses walker. Pt to discharge today, ca arranged @ 0900 for pt, he has an  appt 10am (see SW note). pt is agreeable to discharge plans and he will  his prescriptions at the pharmacy later today. Pt states he understands all discharge instructions and has no questions. Pt discharge @ 0920

## 2017-12-01 NOTE — PROGRESS NOTES
Social Work Services Progress Note    Hospital Day: 6    Collaborated with:  Pt on 11/30, pt's [facility]  and [facility]     Data:  DC plan    Intervention:  Pt has final intake appointment, with anticipated move in to [facility] second week of Dec. In the interim, he will stay with his Auntie.    Pt has extensive support through [facility] Case Management (Yolanda Herman [phone]), [facility] and  [facility] outreach. He will DC from [facility] this AM.  [facility] outreach will pick pt up to bring him to his final Intake appointment.    Pt informed, Dr. Elena informed, pt's bedside RN informed. All parties in agreement.    Assessment:  pt ready for DC. Is eager to have final Intake appointment.    Plan:    Anticipated Disposition:  to family house post meeting w/[facility]    Barriers to d/c plan:  None noted    Follow Up:  Proceed to DC

## 2017-12-01 NOTE — PLAN OF CARE
Problem: Patient Care Overview  Goal: Plan of Care/Patient Progress Review  VS: Pt A&Ox 4. VSS. MSSA score of 3 overnight. Incentive spirometer used while awake.   Output: Voids spontaneously without difficulty in the urinal or bathroom. Pt LBM 11/30 and is passing gas.   Activity: Pt up with standby assist. Independent with bed mobility.    Skin: Intact.    Pain: Mid/upper back pain reported, heat applied and massage provided, patient reported improvement in pain. No reports of abdominal pain overnight.    CMS: CMS and Neuro's are intact. Patient denies numbness and tingling in all extremities. PCD's refused by patient.    Dressing: None.    Diet:  Regular diet. Denies nausea.    LDA:   None.    Sleep: Normal.    Plan: Continue to monitor pt and possibly discharging today 12/1

## 2017-12-01 NOTE — PLAN OF CARE
Problem: PT General Care Plan  Goal: PT target date for goal attainment  PT: patient discharged prior to PT session. Per SW note patient discharged to family house. Patient has not met PT goals per previous note and discharged prior to final assessment for recommendations. Per previous PT session recommendations as follows: 11/30/17      Patient plan for discharge: Not expressed  Current status: Patient demonstrates independence with bed mobility. Needing SBA - CGA for transfers and CGA - Magda for safety with gait using FWW. Patient demos intermittent losses of balance during gait. Engaged in ambulation using FWW for 300 feet and seated and standing LE strengthening exercises.   Barriers to return to prior living situation: Fall risk  Recommendations for discharge: Patient would likely be safe to dc to shelter, may benefit from walker for safety during ambulation although unclear if pt would be able to have one (homeless) and would likely not use it, per report  Rationale for recommendations: Pt overall safe with activity, demonstrates good strength, occasional losses of balance self-corrected. Likely safe to dc to shelter

## 2017-12-01 NOTE — PLAN OF CARE
Problem: Patient Care Overview  Goal: Plan of Care/Patient Progress Review  Outcome: No Change        VS:   Stable.MSSA 3   Output:   Voiding spontaneously.Had BM today.   Activity:   Up SBA1.   Skin: Callus on both feet,tattooos on arms/shoulders.   Pain:   Back pain,warm pack provided.   Neuro/CMS:   intact   Dressing(s):   none   Diet:   Regular,tolerating.   LDA:   none   Equipment:   walker   Plan:   Home with output CD tx   Additional Info:   States tummy ache better.Did eat dinner well.Complaints some back discomfort,trial of warm pack.Call light use reinforced.

## 2018-01-04 ENCOUNTER — HOSPITAL ENCOUNTER (EMERGENCY)
Facility: CLINIC | Age: 53
Discharge: HOME | End: 2018-01-04
Attending: FAMILY MEDICINE | Admitting: FAMILY MEDICINE
Payer: COMMERCIAL

## 2018-01-04 VITALS
TEMPERATURE: 98.6 F | RESPIRATION RATE: 18 BRPM | SYSTOLIC BLOOD PRESSURE: 146 MMHG | OXYGEN SATURATION: 98 % | DIASTOLIC BLOOD PRESSURE: 104 MMHG | HEART RATE: 99 BPM

## 2018-01-04 DIAGNOSIS — F10.230 ALCOHOL DEPENDENCE WITH UNCOMPLICATED WITHDRAWAL (H): ICD-10-CM

## 2018-01-04 LAB — ALCOHOL BREATH TEST: 0.08 (ref 0–0.01)

## 2018-01-04 PROCEDURE — 99283 EMERGENCY DEPT VISIT LOW MDM: CPT | Mod: Z6 | Performed by: FAMILY MEDICINE

## 2018-01-04 PROCEDURE — 82075 ASSAY OF BREATH ETHANOL: CPT | Performed by: FAMILY MEDICINE

## 2018-01-04 PROCEDURE — 99283 EMERGENCY DEPT VISIT LOW MDM: CPT | Performed by: FAMILY MEDICINE

## 2018-01-04 PROCEDURE — 25000132 ZZH RX MED GY IP 250 OP 250 PS 637: Performed by: FAMILY MEDICINE

## 2018-01-04 RX ORDER — DIAZEPAM 5 MG
5 TABLET ORAL ONCE
Status: COMPLETED | OUTPATIENT
Start: 2018-01-04 | End: 2018-01-04

## 2018-01-04 RX ORDER — ONDANSETRON 4 MG/1
8 TABLET, ORALLY DISINTEGRATING ORAL EVERY 8 HOURS PRN
Qty: 10 TABLET | Refills: 0 | Status: SHIPPED | OUTPATIENT
Start: 2018-01-04 | End: 2018-01-07

## 2018-01-04 RX ORDER — GABAPENTIN 400 MG/1
400 CAPSULE ORAL 3 TIMES DAILY
Qty: 12 CAPSULE | Refills: 0 | Status: SHIPPED | OUTPATIENT
Start: 2018-01-04 | End: 2018-01-31

## 2018-01-04 RX ADMIN — DIAZEPAM 5 MG: 5 TABLET ORAL at 11:31

## 2018-01-04 NOTE — ED AVS SNAPSHOT
[facility], Emergency Department    [address]    [place] [address]    Phone:  [phone]    Fax:  [phone]                                       Mario Lamar II   MRN: [id number]    Department:  [facility], Emergency Department   Date of Visit:  1/4/2018           After Visit Summary Signature Page     I have received my discharge instructions, and my questions have been answered. I have discussed any challenges I see with this plan with the nurse or doctor.    ..........................................................................................................................................  Patient/Patient Representative Signature      ..........................................................................................................................................  Patient Representative Print Name and Relationship to Patient    ..................................................               ................................................  Date                                            Time    ..........................................................................................................................................  Reviewed by Signature/Title    ...................................................              ..............................................  Date                                                            Time

## 2018-01-04 NOTE — ED PROVIDER NOTES
"  History     Chief Complaint   Patient presents with     Withdrawal     pt states he drinks 1L daily, states he has withdrawal seizures, had one last month. etoh is .076     HPI  Mario Lamar II is a 52 year old male who presents with concern for alcohol withdrawal.  He drinks up to 1 L of vodka per day.  His last drink was last night.  This morning he felt shaky and so he walked to the emergency room.  He states he generally develops shakes with nausea when he has withdrawal, but did have a withdrawal seizure 1 month ago.  He denies any other drugs of abuse.  He denies any acute mental health symptoms.  He states \"I just need something for my withdrawal and a ride home\".  He states he will refuse any detox or treatment services.    I have reviewed the Medications, Allergies, Past Medical and Surgical History, and Social History in the Epic system.    Review of Systems  All other systems were reviewed and are negative    Physical Exam   BP: (!) 146/104  Pulse: 99  Temp: 98.6  F (37  C)  Resp: 18  SpO2: 98 %      Physical Exam   Constitutional: He is oriented to person, place, and time. He appears well-developed and well-nourished.   HENT:   Head: Normocephalic and atraumatic.   Mouth/Throat: Oropharynx is clear and moist.   Eyes: EOM are normal. Pupils are equal, round, and reactive to light.   Neck: Normal range of motion. Neck supple. No tracheal deviation present. No thyromegaly present.   Cardiovascular: Normal rate, regular rhythm, normal heart sounds and intact distal pulses.  Exam reveals no gallop and no friction rub.    No murmur heard.  Pulmonary/Chest: Effort normal and breath sounds normal. He exhibits no tenderness.   Abdominal: Soft. Bowel sounds are normal. He exhibits no distension and no mass. There is no tenderness.   Musculoskeletal: He exhibits no edema or tenderness.   Neurological: He is alert and oriented to person, place, and time. He has normal strength. He displays tremor (He " does have some mild tremor, at times this seems volitional). No cranial nerve deficit or sensory deficit. Coordination and gait normal.   Skin: Skin is warm and dry. No rash noted.   Psychiatric: His speech is normal and behavior is normal. Judgment and thought content normal. His mood appears anxious. Cognition and memory are normal.   Nursing note and vitals reviewed.      ED Course     ED Course     Procedures             Critical Care time:  none             Labs Ordered and Resulted from Time of ED Arrival Up to the Time of Departure from the ED   ALCOHOL BREATH TEST POCT - Abnormal; Notable for the following:        Result Value    Alcohol Breath Test 0.076 (*)     All other components within normal limits            Assessments & Plan (with Medical Decision Making)   Patient with a long-standing history of alcoholism presents due to 2 symptoms of early alcohol withdrawal.  He has mild elevation of blood pressure and some subtle tremor.  His alcohol level is below the legal limit, and he is clinically sober.  He was given a dose of oral Valium.  I offered him admission to detox but he states he will decline.  He tells me he would like a ride home, and he has no interest in detox or treatment.  He is not planning on stopping drinking.  I offered to prescribe him medications for alcohol withdrawal including gabapentin and Zofran and he states he will take the prescriptions and consider detoxing himself at home.  I advised him that with his risk of seizures thought he was taking an unnecessary chance of significant medical complications, and that there are detox beds available, but he is unwilling to stay.  He meets no grounds for 72 hour hold.  He will be discharged.    I have reviewed the nursing notes.    I have reviewed the findings, diagnosis, plan and need for follow up with the patient.    New Prescriptions    GABAPENTIN (NEURONTIN) 400 MG CAPSULE    Take 1 capsule (400 mg) by mouth 3 times daily For 3  days, then twice daily for 1 day, then once daily on day 5, then stop    ONDANSETRON (ZOFRAN ODT) 4 MG ODT TAB    Take 2 tablets (8 mg) by mouth every 8 hours as needed for nausea       Final diagnoses:   Alcohol dependence with uncomplicated withdrawal (H)       1/4/2018   [facility], [place], EMERGENCY DEPARTMENT     Matt Roe MD  01/04/18 9084

## 2018-01-04 NOTE — DISCHARGE INSTRUCTIONS
Thank you for choosing [facility].     Please closely monitor for further symptoms. Return to the Emergency Department if you develop any new or worsening signs or symptoms.    If you received any opiate pain medications or sedatives during your visit, please do not drive for at least 8 hours.     Labs, cultures or final xray interpretations may still need to be reviewed.  We will call you if your plan of care needs to be changed.    Please follow up with your primary care physician or clinic.  We recommend you enter a detox facility as soon as possible when you desire to stop using alcohol.  To check the status of detox bed availability at [facility] call:  [phone]  To check the status of detox availability at [facility] call:  [phone]  To check the status of detox bed availability at [address] [place] [address] call:  [phone]  If you desire chemical dependency assessment or counseling, follow up with [facility]: [phone]

## 2018-01-31 ENCOUNTER — HOSPITAL ENCOUNTER (INPATIENT)
Facility: CLINIC | Age: 53
LOS: 4 days | Discharge: HOME | End: 2018-02-04
Attending: FAMILY MEDICINE | Admitting: INTERNAL MEDICINE
Payer: COMMERCIAL

## 2018-01-31 ENCOUNTER — APPOINTMENT (OUTPATIENT)
Dept: ULTRASOUND IMAGING | Facility: CLINIC | Age: 53
End: 2018-01-31
Attending: FAMILY MEDICINE
Payer: COMMERCIAL

## 2018-01-31 DIAGNOSIS — F10.239 ALCOHOL DEPENDENCE WITH WITHDRAWAL WITH COMPLICATION (H): ICD-10-CM

## 2018-01-31 DIAGNOSIS — K85.10 ACUTE BILIARY PANCREATITIS, UNSPECIFIED COMPLICATION STATUS: ICD-10-CM

## 2018-01-31 DIAGNOSIS — K85.90 ACUTE PANCREATITIS, UNSPECIFIED COMPLICATION STATUS, UNSPECIFIED PANCREATITIS TYPE: ICD-10-CM

## 2018-01-31 DIAGNOSIS — K83.1 BILIARY OBSTRUCTION (H): ICD-10-CM

## 2018-01-31 PROBLEM — R10.9 ABDOMINAL PAIN: Status: ACTIVE | Noted: 2018-01-31

## 2018-01-31 PROBLEM — K86.1 ACUTE ON CHRONIC PANCREATITIS (H): Status: ACTIVE | Noted: 2018-01-31

## 2018-01-31 LAB
ALBUMIN SERPL-MCNC: 4.2 G/DL (ref 3.4–5)
ALCOHOL BREATH TEST: 0.33 (ref 0–0.01)
ALP SERPL-CCNC: 261 U/L (ref 40–150)
ALT SERPL W P-5'-P-CCNC: 132 U/L (ref 0–70)
ANION GAP SERPL CALCULATED.3IONS-SCNC: 18 MMOL/L (ref 3–14)
APAP SERPL-MCNC: NORMAL MG/L (ref 10–20)
AST SERPL W P-5'-P-CCNC: 345 U/L (ref 0–45)
BASOPHILS # BLD AUTO: 0.1 10E9/L (ref 0–0.2)
BASOPHILS NFR BLD AUTO: 1.2 %
BILIRUB SERPL-MCNC: 3.9 MG/DL (ref 0.2–1.3)
BUN SERPL-MCNC: 9 MG/DL (ref 7–30)
CALCIUM SERPL-MCNC: 8.9 MG/DL (ref 8.5–10.1)
CHLORIDE SERPL-SCNC: 96 MMOL/L (ref 94–109)
CO2 SERPL-SCNC: 22 MMOL/L (ref 20–32)
CREAT SERPL-MCNC: 0.46 MG/DL (ref 0.66–1.25)
DIFFERENTIAL METHOD BLD: ABNORMAL
EOSINOPHIL # BLD AUTO: 0.1 10E9/L (ref 0–0.7)
EOSINOPHIL NFR BLD AUTO: 0.9 %
ERYTHROCYTE [DISTWIDTH] IN BLOOD BY AUTOMATED COUNT: 15.6 % (ref 10–15)
ETHANOL SERPL-MCNC: 0.41 G/DL
GFR SERPL CREATININE-BSD FRML MDRD: >90 ML/MIN/1.7M2
GLUCOSE SERPL-MCNC: 66 MG/DL (ref 70–99)
HCT VFR BLD AUTO: 41.6 % (ref 40–53)
HGB BLD-MCNC: 14.4 G/DL (ref 13.3–17.7)
IMM GRANULOCYTES # BLD: 0 10E9/L (ref 0–0.4)
IMM GRANULOCYTES NFR BLD: 0.4 %
INR PPP: 1.04 (ref 0.86–1.14)
LIPASE SERPL-CCNC: 631 U/L (ref 73–393)
LYMPHOCYTES # BLD AUTO: 2 10E9/L (ref 0.8–5.3)
LYMPHOCYTES NFR BLD AUTO: 28.8 %
MAGNESIUM SERPL-MCNC: 1.8 MG/DL (ref 1.6–2.3)
MCH RBC QN AUTO: 32.1 PG (ref 26.5–33)
MCHC RBC AUTO-ENTMCNC: 34.6 G/DL (ref 31.5–36.5)
MCV RBC AUTO: 93 FL (ref 78–100)
MONOCYTES # BLD AUTO: 0.2 10E9/L (ref 0–1.3)
MONOCYTES NFR BLD AUTO: 3.4 %
NEUTROPHILS # BLD AUTO: 4.4 10E9/L (ref 1.6–8.3)
NEUTROPHILS NFR BLD AUTO: 65.3 %
NRBC # BLD AUTO: 0 10*3/UL
NRBC BLD AUTO-RTO: 0 /100
PLATELET # BLD AUTO: 62 10E9/L (ref 150–450)
POTASSIUM SERPL-SCNC: 3 MMOL/L (ref 3.4–5.3)
PROT SERPL-MCNC: 9.4 G/DL (ref 6.8–8.8)
RBC # BLD AUTO: 4.48 10E12/L (ref 4.4–5.9)
SALICYLATES SERPL-MCNC: <2 MG/DL
SODIUM SERPL-SCNC: 136 MMOL/L (ref 133–144)
WBC # BLD AUTO: 6.8 10E9/L (ref 4–11)

## 2018-01-31 PROCEDURE — 25800025 ZZH RX 258: Performed by: FAMILY MEDICINE

## 2018-01-31 PROCEDURE — 96367 TX/PROPH/DG ADDL SEQ IV INF: CPT | Performed by: FAMILY MEDICINE

## 2018-01-31 PROCEDURE — 25000125 ZZHC RX 250: Performed by: FAMILY MEDICINE

## 2018-01-31 PROCEDURE — 99223 1ST HOSP IP/OBS HIGH 75: CPT | Mod: AI | Performed by: INTERNAL MEDICINE

## 2018-01-31 PROCEDURE — 76705 ECHO EXAM OF ABDOMEN: CPT

## 2018-01-31 PROCEDURE — 80329 ANALGESICS NON-OPIOID 1 OR 2: CPT | Performed by: FAMILY MEDICINE

## 2018-01-31 PROCEDURE — 96365 THER/PROPH/DIAG IV INF INIT: CPT | Performed by: FAMILY MEDICINE

## 2018-01-31 PROCEDURE — 85610 PROTHROMBIN TIME: CPT | Performed by: FAMILY MEDICINE

## 2018-01-31 PROCEDURE — 96361 HYDRATE IV INFUSION ADD-ON: CPT | Performed by: FAMILY MEDICINE

## 2018-01-31 PROCEDURE — 80320 DRUG SCREEN QUANTALCOHOLS: CPT | Performed by: FAMILY MEDICINE

## 2018-01-31 PROCEDURE — 96366 THER/PROPH/DIAG IV INF ADDON: CPT | Performed by: FAMILY MEDICINE

## 2018-01-31 PROCEDURE — 12000008 ZZH R&B INTERMEDIATE UMMC

## 2018-01-31 PROCEDURE — 85025 COMPLETE CBC W/AUTO DIFF WBC: CPT | Performed by: FAMILY MEDICINE

## 2018-01-31 PROCEDURE — 25000128 H RX IP 250 OP 636: Performed by: FAMILY MEDICINE

## 2018-01-31 PROCEDURE — 83690 ASSAY OF LIPASE: CPT | Performed by: FAMILY MEDICINE

## 2018-01-31 PROCEDURE — 99285 EMERGENCY DEPT VISIT HI MDM: CPT | Mod: 25 | Performed by: FAMILY MEDICINE

## 2018-01-31 PROCEDURE — 80053 COMPREHEN METABOLIC PANEL: CPT | Performed by: FAMILY MEDICINE

## 2018-01-31 PROCEDURE — 25000128 H RX IP 250 OP 636: Performed by: STUDENT IN AN ORGANIZED HEALTH CARE EDUCATION/TRAINING PROGRAM

## 2018-01-31 PROCEDURE — HZ2ZZZZ DETOXIFICATION SERVICES FOR SUBSTANCE ABUSE TREATMENT: ICD-10-PCS | Performed by: INTERNAL MEDICINE

## 2018-01-31 PROCEDURE — 96375 TX/PRO/DX INJ NEW DRUG ADDON: CPT | Performed by: FAMILY MEDICINE

## 2018-01-31 PROCEDURE — 83735 ASSAY OF MAGNESIUM: CPT | Performed by: FAMILY MEDICINE

## 2018-01-31 PROCEDURE — 25000132 ZZH RX MED GY IP 250 OP 250 PS 637: Performed by: STUDENT IN AN ORGANIZED HEALTH CARE EDUCATION/TRAINING PROGRAM

## 2018-01-31 PROCEDURE — 99285 EMERGENCY DEPT VISIT HI MDM: CPT | Mod: Z6 | Performed by: FAMILY MEDICINE

## 2018-01-31 RX ORDER — POTASSIUM CL/LIDO/0.9 % NACL 10MEQ/0.1L
10 INTRAVENOUS SOLUTION, PIGGYBACK (ML) INTRAVENOUS ONCE
Status: COMPLETED | OUTPATIENT
Start: 2018-01-31 | End: 2018-01-31

## 2018-01-31 RX ORDER — SODIUM CHLORIDE 9 MG/ML
1000 INJECTION, SOLUTION INTRAVENOUS CONTINUOUS
Status: DISCONTINUED | OUTPATIENT
Start: 2018-01-31 | End: 2018-01-31

## 2018-01-31 RX ORDER — HYDROMORPHONE HCL/0.9% NACL/PF 0.2MG/0.2
0.2 SYRINGE (ML) INTRAVENOUS ONCE
Status: COMPLETED | OUTPATIENT
Start: 2018-01-31 | End: 2018-01-31

## 2018-01-31 RX ORDER — NALOXONE HYDROCHLORIDE 0.4 MG/ML
.1-.4 INJECTION, SOLUTION INTRAMUSCULAR; INTRAVENOUS; SUBCUTANEOUS
Status: DISCONTINUED | OUTPATIENT
Start: 2018-01-31 | End: 2018-01-31

## 2018-01-31 RX ORDER — FOLIC ACID 1 MG/1
1 TABLET ORAL DAILY
Status: DISCONTINUED | OUTPATIENT
Start: 2018-01-31 | End: 2018-02-04 | Stop reason: HOSPADM

## 2018-01-31 RX ORDER — POTASSIUM CHLORIDE 1.5 G/1.58G
20 POWDER, FOR SOLUTION ORAL ONCE
Status: DISCONTINUED | OUTPATIENT
Start: 2018-01-31 | End: 2018-02-04 | Stop reason: HOSPADM

## 2018-01-31 RX ORDER — SODIUM CHLORIDE, SODIUM LACTATE, POTASSIUM CHLORIDE, CALCIUM CHLORIDE 600; 310; 30; 20 MG/100ML; MG/100ML; MG/100ML; MG/100ML
INJECTION, SOLUTION INTRAVENOUS CONTINUOUS
Status: DISCONTINUED | OUTPATIENT
Start: 2018-01-31 | End: 2018-02-01

## 2018-01-31 RX ORDER — LANOLIN ALCOHOL/MO/W.PET/CERES
100 CREAM (GRAM) TOPICAL DAILY
Status: COMPLETED | OUTPATIENT
Start: 2018-01-31 | End: 2018-02-02

## 2018-01-31 RX ORDER — LORAZEPAM 1 MG/1
1-4 TABLET ORAL EVERY 30 MIN PRN
Status: DISCONTINUED | OUTPATIENT
Start: 2018-01-31 | End: 2018-02-04 | Stop reason: HOSPADM

## 2018-01-31 RX ORDER — HYDROMORPHONE HCL/0.9% NACL/PF 0.2MG/0.2
.2-.4 SYRINGE (ML) INTRAVENOUS
Status: DISCONTINUED | OUTPATIENT
Start: 2018-01-31 | End: 2018-02-04 | Stop reason: HOSPADM

## 2018-01-31 RX ORDER — HYDROMORPHONE HCL/0.9% NACL/PF 0.2MG/0.2
0.2 SYRINGE (ML) INTRAVENOUS
Status: DISCONTINUED | OUTPATIENT
Start: 2018-01-31 | End: 2018-01-31

## 2018-01-31 RX ORDER — LIDOCAINE 40 MG/G
CREAM TOPICAL
Status: DISCONTINUED | OUTPATIENT
Start: 2018-01-31 | End: 2018-02-04 | Stop reason: HOSPADM

## 2018-01-31 RX ORDER — NALOXONE HYDROCHLORIDE 0.4 MG/ML
.1-.4 INJECTION, SOLUTION INTRAMUSCULAR; INTRAVENOUS; SUBCUTANEOUS
Status: DISCONTINUED | OUTPATIENT
Start: 2018-01-31 | End: 2018-02-04 | Stop reason: HOSPADM

## 2018-01-31 RX ORDER — ONDANSETRON 2 MG/ML
4 INJECTION INTRAMUSCULAR; INTRAVENOUS
Status: COMPLETED | OUTPATIENT
Start: 2018-01-31 | End: 2018-01-31

## 2018-01-31 RX ADMIN — SODIUM CHLORIDE 1000 ML: 9 INJECTION, SOLUTION INTRAVENOUS at 15:10

## 2018-01-31 RX ADMIN — Medication 0.2 MG: at 14:57

## 2018-01-31 RX ADMIN — SODIUM CHLORIDE 1000 ML: 9 INJECTION, SOLUTION INTRAVENOUS at 13:00

## 2018-01-31 RX ADMIN — ONDANSETRON 4 MG: 2 INJECTION INTRAMUSCULAR; INTRAVENOUS at 10:39

## 2018-01-31 RX ADMIN — Medication 100 MG: at 21:50

## 2018-01-31 RX ADMIN — PANTOPRAZOLE SODIUM 40 MG: 40 INJECTION, POWDER, FOR SOLUTION INTRAVENOUS at 10:40

## 2018-01-31 RX ADMIN — PROCHLORPERAZINE EDISYLATE 5 MG: 5 INJECTION INTRAMUSCULAR; INTRAVENOUS at 12:52

## 2018-01-31 RX ADMIN — FOLIC ACID: 5 INJECTION, SOLUTION INTRAMUSCULAR; INTRAVENOUS; SUBCUTANEOUS at 10:41

## 2018-01-31 RX ADMIN — Medication 10 MEQ: at 15:10

## 2018-01-31 RX ADMIN — SODIUM CHLORIDE, POTASSIUM CHLORIDE, SODIUM LACTATE AND CALCIUM CHLORIDE: 600; 310; 30; 20 INJECTION, SOLUTION INTRAVENOUS at 15:00

## 2018-01-31 RX ADMIN — SODIUM CHLORIDE 1000 ML: 9 INJECTION, SOLUTION INTRAVENOUS at 11:41

## 2018-01-31 RX ADMIN — Medication 10 MEQ: at 13:59

## 2018-01-31 RX ADMIN — LORAZEPAM 1 MG: 1 TABLET ORAL at 22:02

## 2018-01-31 RX ADMIN — FOLIC ACID 1 MG: 1 TABLET ORAL at 21:50

## 2018-01-31 RX ADMIN — Medication 10 MEQ: at 12:25

## 2018-01-31 RX ADMIN — SODIUM CHLORIDE, POTASSIUM CHLORIDE, SODIUM LACTATE AND CALCIUM CHLORIDE 1000 ML: 600; 310; 30; 20 INJECTION, SOLUTION INTRAVENOUS at 21:49

## 2018-01-31 NOTE — ED NOTES
Bed: ED20  Expected date: 1/31/18  Expected time: 9:55 AM  Means of arrival: Ambulance  [facility] 53[facility]

## 2018-01-31 NOTE — PHARMACY-ADMISSION MEDICATION HISTORY
Admission medication history interview status for the 1/31/2018 admission is complete. See Epic admission navigator for allergy information, pharmacy, prior to admission medications and immunization status.     Medication history interview sources:  Patient, Epic electronic medical record (discharged from [facility] on 12/1/17)    Changes made to PTA medication list (reason)  Added: none  Deleted: gabapentin, see note below for further details  Changed: none    Additional medication history information (including reliability of information, actions taken by pharmacist): The patient reported he does not take any prescription medications, over the counter products, vitamins or supplements. He reported he has not been taking any medications for about a year.    The patient was discharged from [facility] on 12/1/17 and prescribed the following medications upon discharge:  acetaminophen 650 mg PO TID PRN  Cholecalciferol 2000 units PO daily  escitalopram 10 mg PO daily  Hydroxyzine 25 mg PO TID  Melatonin 1 mg PO QHS PRN  Mirtazapine 15 mg PO QHS  Multivitamin 1 tablet PO daily      Prior to Admission medications    None       Medication history completed by:  Kika Melgar, PharmD, BCPP  Behavioral ER Pharmacist  [phone]

## 2018-01-31 NOTE — IP AVS SNAPSHOT
[address] [address]    Phone:  [phone]                                       After Visit Summary   1/31/2018    Mario Lamar II    MRN: [id number]           After Visit Summary Signature Page     I have received my discharge instructions, and my questions have been answered. I have discussed any challenges I see with this plan with the nurse or doctor.    ..........................................................................................................................................  Patient/Patient Representative Signature      ..........................................................................................................................................  Patient Representative Print Name and Relationship to Patient    ..................................................               ................................................  Date                                            Time    ..........................................................................................................................................  Reviewed by Signature/Title    ...................................................              ..............................................  Date                                                            Time

## 2018-01-31 NOTE — IP AVS SNAPSHOT
MRN:[id number]                      After Visit Summary   1/31/2018    Mario Lamar II    MRN: [id number]           Thank you!     Thank you for choosing [facility] for your care. Our goal is always to provide you with excellent care. Hearing back from our patients is one way we can continue to improve our services. Please take a few minutes to complete the written survey that you may receive in the mail after you visit with us. Thank you!        Patient Information     Date Of Birth          1965        Designated Caregiver       Most Recent Value    Caregiver    Will someone help with your care after discharge? no      About your hospital stay     You were admitted on:  January 31, 2018 You last received care in the:  [address] [facility]    You were discharged on:  February 4, 2018        Reason for your hospital stay       Alcohol withdrawal  Hepatitis                  Who to Call     For medical emergencies, please call 911.  For non-urgent questions about your medical care, please call your primary care provider or clinic, [phone]          Attending Provider     Provider Specialty    Matt Roe MD Family Practice    Stew Barcenas MD Internal Medicine    Mayelin Solomon DO Internal Medicine       Primary Care Provider Office Phone # Fax #    Elle Aguilar [phone]      After Care Instructions     Activity       Your activity upon discharge: activity as tolerated            Diet       Follow this diet upon discharge: Orders Placed This Encounter      2 Gram Sodium Diet                  Follow-up Appointments     Adult [facility] Follow-up and recommended labs and tests       Follow up with primary care provider, Elle Aguilar, within 7 days for hospital follow- up.  The following labs/tests are recommended: CMP.      Appointments on [place] and/or [facility] (with [facility] or [facility] provider or service). Call [phone] if you haven't  "heard regarding these appointments within 7 days of discharge.                  Pending Results     No orders found from 2018 to 2018.            Statement of Approval     Ordered          18 0953  I have reviewed and agree with all the recommendations and orders detailed in this document.  EFFECTIVE NOW     Approved and electronically signed by:  Mayelin Solomon DO             Admission Information     Date & Time Provider Department Dept. Phone    2018 Mayelin Solomon DO [address] [facility] [phone]      Your Vitals Were     Blood Pressure Pulse Temperature Respirations Height Weight    108/69 (BP Location: Left arm) 78 97.5  F (36.4  C) (Oral) 16 1.74 m (5' 8.5\") 65.4 kg (144 lb 3.2 oz)    Pulse Oximetry BMI (Body Mass Index)                93% 21.61 kg/m2          [employer] Information     [employer] lets you send messages to your doctor, view your test results, renew your prescriptions, schedule appointments and more. To sign up, go to [email or web address] . Click on \"Log in\" on the left side of the screen, which will take you to the Welcome page. Then click on \"Sign up Now\" on the right side of the page.     You will be asked to enter the access code listed below, as well as some personal information. Please follow the directions to create your username and password.     Your access code is: [email or web address]  Expires: 3/1/2018  9:03 AM     Your access code will  in 90 days. If you need help or a new code, please call your [facility] [place] clinic or [phone].        Care EveryWhere ID     This is your Care EveryWhere ID. This could be used by other organizations to access your [facility] medical records  [id number]        Equal Access to Services     HOWIE ROSENBAUM AH: Geri Bender, marko galvan, arelis barrios, quentin luke. So [facility] [phone].    ATENCIÓN: Si habla español, tiene a lo disposición servicios gratuitos de asistencia " avni Catalan al [phone].    We comply with applicable federal civil rights laws and [place] laws. We do not discriminate on the basis of race, color, national origin, age, disability, sex, sexual orientation, or gender identity.               Review of your medicines      START taking        Dose / Directions    acamprosate 333 MG EC tablet   Commonly known as:  CAMPRAL   Used for:  Alcohol dependence with withdrawal with complication (H)        Dose:  666 mg   Take 2 tablets (666 mg) by mouth 3 times daily   Quantity:  90 tablet   Refills:  0       folic acid 1 MG tablet   Commonly known as:  FOLVITE   Used for:  Alcohol dependence with withdrawal with complication (H)        Dose:  1 mg   Start taking on:  2/5/2018   Take 1 tablet (1 mg) by mouth daily   Quantity:  30 tablet   Refills:  0       pantoprazole 40 MG EC tablet   Commonly known as:  PROTONIX   Used for:  Alcohol dependence with withdrawal with complication (H)        Dose:  40 mg   Start taking on:  2/5/2018   Take 1 tablet (40 mg) by mouth every morning   Quantity:  30 tablet   Refills:  0       thiamine 100 MG tablet   Used for:  Alcohol dependence with withdrawal with complication (H)        Dose:  100 mg   Start taking on:  2/5/2018   Take 1 tablet (100 mg) by mouth daily   Quantity:  30 tablet   Refills:  0            Where to get your medicines      These medications were sent to [facility] [address] [address]     Phone:  [phone]     acamprosate 333 MG EC tablet    folic acid 1 MG tablet    pantoprazole 40 MG EC tablet    thiamine 100 MG tablet                Protect others around you: Learn how to safely use, store and throw away your medicines at www.disposemymeds.org.             Medication List: This is a list of all your medications and when to take them. Check marks below indicate your daily home schedule. Keep this list as a reference.       Medications           Morning Afternoon Evening Bedtime As Needed    acamprosate 333 MG EC tablet   Commonly known as:  CAMPRAL   Take 2 tablets (666 mg) by mouth 3 times daily                                folic acid 1 MG tablet   Commonly known as:  FOLVITE   Take 1 tablet (1 mg) by mouth daily   Start taking on:  2/5/2018   Last time this was given:  1 mg on 2/4/2018  7:43 AM                                pantoprazole 40 MG EC tablet   Commonly known as:  PROTONIX   Take 1 tablet (40 mg) by mouth every morning   Start taking on:  2/5/2018   Last time this was given:  40 mg on 2/4/2018  7:43 AM                                thiamine 100 MG tablet   Take 1 tablet (100 mg) by mouth daily   Start taking on:  2/5/2018   Last time this was given:  100 mg on 2/4/2018  7:43 AM

## 2018-01-31 NOTE — ED PROVIDER NOTES
History     Chief Complaint   Patient presents with     Abdominal Pain     drinking mouthwash. compains of abd pain that startesd this am     HPI  Mario Lamar II is a 52 year old male who has a history of chronic alcoholism, depressive disorder, anxiety, alcoholic pancreatitis.  He states he drinks alcohol on a daily basis.  Today he did not have much money and so he was drinking mouthwash.  He cannot specify which type of mouthwash, but says he developed mid abdominal pain with nausea and vomiting.  Denies any trauma.  He states he is concerned he may have pancreatitis again.  Pain is in the epigastrium, nonradiating.  No blood in his emesis.  No other complaints.    I have reviewed the Medications, Allergies, Past Medical and Surgical History, and Social History in the [employer] system.  Past Medical History:   Diagnosis Date     Depression      Pancreatitis      PTSD (post-traumatic stress disorder)      Seizures (H)     withdrawl     Substance abuse     alcohol abuse     Past Surgical History:   Procedure Laterality Date     ORTHOPEDIC SURGERY      Right ankle orthopedic surgery after fracture.       Review of Systems  All other systems were reviewed and are negative    Physical Exam   BP: (!) 145/93  Pulse: 85  Temp: 97.7  F (36.5  C)  Resp: 16  SpO2: 96 %      Physical Exam   Constitutional: He is oriented to person, place, and time. He appears well-developed and well-nourished.   HENT:   Head: Normocephalic and atraumatic.   Mouth/Throat: Oropharynx is clear and moist.   Eyes: EOM are normal. Pupils are equal, round, and reactive to light.   Neck: Normal range of motion. Neck supple. No tracheal deviation present. No thyromegaly present.   Cardiovascular: Normal rate, regular rhythm, normal heart sounds and intact distal pulses.  Exam reveals no gallop and no friction rub.    No murmur heard.  Pulmonary/Chest: Effort normal and breath sounds normal. He exhibits no tenderness.   Abdominal: Soft.  Bowel sounds are normal. He exhibits no distension and no mass. There is tenderness in the epigastric area. There is no rigidity, no rebound, no guarding and no CVA tenderness.   Musculoskeletal: He exhibits no edema or tenderness.        Arms:  Neurological: He is alert and oriented to person, place, and time. No cranial nerve deficit. Coordination normal.   Skin: Skin is warm and dry. No rash noted.   Psychiatric: He has a normal mood and affect. His behavior is normal. His speech is delayed and slurred (Consistent with markedly elevated alcohol level). He expresses inappropriate judgment.   Nursing note and vitals reviewed.      ED Course     ED Course     Procedures             Critical Care time:  none             Labs Ordered and Resulted from Time of ED Arrival Up to the Time of Departure from the ED   CBC WITH PLATELETS DIFFERENTIAL - Abnormal; Notable for the following:        Result Value    RDW 15.6 (*)     Platelet Count 62 (*)     All other components within normal limits   COMPREHENSIVE METABOLIC PANEL - Abnormal; Notable for the following:     Potassium 3.0 (*)     Anion Gap 18 (*)     Glucose 66 (*)     Creatinine 0.46 (*)     Bilirubin Total 3.9 (*)     Protein Total 9.4 (*)     Alkaline Phosphatase 261 (*)      (*)      (*)     All other components within normal limits   LIPASE - Abnormal; Notable for the following:     Lipase 631 (*)     All other components within normal limits   ALCOHOL ETHYL - Abnormal; Notable for the following:     Ethanol g/dL 0.41 (*)     All other components within normal limits   ALCOHOL BREATH TEST POCT - Abnormal; Notable for the following:     Alcohol Breath Test 0.333 (*)     All other components within normal limits   INR   MAGNESIUM   SALICYLATE LEVEL   ACETAMINOPHEN LEVEL   ACETAMINOPHEN   PULSE OXIMETRY NURSING            Assessments & Plan (with Medical Decision Making)   Patient with a history of chronic alcoholism, recurrent alcoholic  pancreatitis, and cholelithiasis without obstruction or cholecystitis presenting with epigastric pain in context of recent binge drinking (including drinking unspecified type of mouthwash today).  Initial differential diagnosis included but was not restricted to alcoholic gastritis, pancreatitis, cholecystitis or cholelithiasis, peptic ulcer disease, duodenitis, small bowel obstruction, intestinal ischemia, atypical cardiac presentation, AAA, pyelonephritis, ureterolithiasis, as well as numerous other life-threatening and the life-threatening causes of epigastric and right upper quadrant pain.  On exam, the patient is grasping and emesis bag but is not vomiting.  He has slight elevation of his blood pressure with otherwise normal vital signs.  He has tenderness in the epigastrium and right upper quadrant but no guarding rebound or peritoneal signs.  He smells strongly of alcohol.  The remainder of his physical exam is unremarkable.  His labs reveal markedly elevated alcohol level, some thrombocytopenia, hypokalemia and anion gap elevated which would be consistent with acute on chronic alcoholism.  His liver enzymes however suggest the possibility of an obstructive pattern in addition to sequelae of alcoholic liver disease and alcoholic hepatitis.  Ultrasound was performed and shows common bile duct dilated to 11 mm with possible distal common duct obstruction.  Patient has been treated with IV fluids, banana bag, potassium repletion.  Case discussed with the medicine service and the hepatobiliary service, and the patient will be admitted to the [facility] for ongoing management and further evaluation of abdominal pain with dilated common bile duct and elevated lipase and LFTs.  He appears stable for transfer to the [facility].    I have reviewed the nursing notes.    I have reviewed the findings, diagnosis, plan and need for follow up with the patient.    New Prescriptions    No medications on file       Final  diagnoses:   Biliary obstruction   Alcohol dependence with withdrawal with complication (H)   Acute pancreatitis, unspecified complication status, unspecified pancreatitis type       1/31/2018   [facility], [place], EMERGENCY DEPARTMENT     Matt Roe MD  01/31/18 3555

## 2018-02-01 ENCOUNTER — APPOINTMENT (OUTPATIENT)
Dept: MRI IMAGING | Facility: CLINIC | Age: 53
End: 2018-02-01
Payer: COMMERCIAL

## 2018-02-01 LAB
ALBUMIN SERPL-MCNC: 3.1 G/DL (ref 3.4–5)
ALP SERPL-CCNC: 251 U/L (ref 40–150)
ALT SERPL W P-5'-P-CCNC: 121 U/L (ref 0–70)
ANION GAP SERPL CALCULATED.3IONS-SCNC: 15 MMOL/L (ref 3–14)
APAP SERPL-MCNC: <5 UG/ML
AST SERPL W P-5'-P-CCNC: 323 U/L (ref 0–45)
BILIRUB DIRECT SERPL-MCNC: 5.5 MG/DL (ref 0–0.2)
BILIRUB SERPL-MCNC: 7.2 MG/DL (ref 0.2–1.3)
BUN SERPL-MCNC: 3 MG/DL (ref 7–30)
CALCIUM SERPL-MCNC: 8 MG/DL (ref 8.5–10.1)
CHLORIDE SERPL-SCNC: 94 MMOL/L (ref 94–109)
CO2 SERPL-SCNC: 21 MMOL/L (ref 20–32)
CREAT SERPL-MCNC: 0.39 MG/DL (ref 0.66–1.25)
D DIMER PPP FEU-MCNC: 1.6 UG/ML FEU (ref 0–0.5)
ERYTHROCYTE [DISTWIDTH] IN BLOOD BY AUTOMATED COUNT: 15.8 % (ref 10–15)
FIBRINOGEN PPP-MCNC: 307 MG/DL (ref 200–420)
GFR SERPL CREATININE-BSD FRML MDRD: >90 ML/MIN/1.7M2
GLUCOSE BLDC GLUCOMTR-MCNC: 122 MG/DL (ref 70–99)
GLUCOSE BLDC GLUCOMTR-MCNC: 125 MG/DL (ref 70–99)
GLUCOSE BLDC GLUCOMTR-MCNC: 130 MG/DL (ref 70–99)
GLUCOSE BLDC GLUCOMTR-MCNC: 64 MG/DL (ref 70–99)
GLUCOSE BLDC GLUCOMTR-MCNC: 81 MG/DL (ref 70–99)
GLUCOSE SERPL-MCNC: 54 MG/DL (ref 70–99)
HAPTOGLOB SERPL-MCNC: 78 MG/DL (ref 15–200)
HCT VFR BLD AUTO: 35.7 % (ref 40–53)
HGB BLD-MCNC: 11.9 G/DL (ref 13.3–17.7)
INR PPP: 1.1 (ref 0.86–1.14)
LDH SERPL L TO P-CCNC: 186 U/L (ref 85–227)
MAGNESIUM SERPL-MCNC: 1.4 MG/DL (ref 1.6–2.3)
MAGNESIUM SERPL-MCNC: 2.2 MG/DL (ref 1.6–2.3)
MCH RBC QN AUTO: 31.2 PG (ref 26.5–33)
MCHC RBC AUTO-ENTMCNC: 33.3 G/DL (ref 31.5–36.5)
MCV RBC AUTO: 94 FL (ref 78–100)
PHOSPHATE SERPL-MCNC: 1.9 MG/DL (ref 2.5–4.5)
PHOSPHATE SERPL-MCNC: 2.2 MG/DL (ref 2.5–4.5)
PLATELET # BLD AUTO: 43 10E9/L (ref 150–450)
POTASSIUM SERPL-SCNC: 3.2 MMOL/L (ref 3.4–5.3)
POTASSIUM SERPL-SCNC: 3.9 MMOL/L (ref 3.4–5.3)
PROT SERPL-MCNC: 7.3 G/DL (ref 6.8–8.8)
RBC # BLD AUTO: 3.82 10E12/L (ref 4.4–5.9)
SODIUM SERPL-SCNC: 131 MMOL/L (ref 133–144)
WBC # BLD AUTO: 5.2 10E9/L (ref 4–11)

## 2018-02-01 PROCEDURE — 25000128 H RX IP 250 OP 636: Performed by: STUDENT IN AN ORGANIZED HEALTH CARE EDUCATION/TRAINING PROGRAM

## 2018-02-01 PROCEDURE — 82248 BILIRUBIN DIRECT: CPT | Performed by: STUDENT IN AN ORGANIZED HEALTH CARE EDUCATION/TRAINING PROGRAM

## 2018-02-01 PROCEDURE — 85610 PROTHROMBIN TIME: CPT | Performed by: STUDENT IN AN ORGANIZED HEALTH CARE EDUCATION/TRAINING PROGRAM

## 2018-02-01 PROCEDURE — 25000132 ZZH RX MED GY IP 250 OP 250 PS 637: Performed by: STUDENT IN AN ORGANIZED HEALTH CARE EDUCATION/TRAINING PROGRAM

## 2018-02-01 PROCEDURE — 85027 COMPLETE CBC AUTOMATED: CPT | Performed by: STUDENT IN AN ORGANIZED HEALTH CARE EDUCATION/TRAINING PROGRAM

## 2018-02-01 PROCEDURE — 25000128 H RX IP 250 OP 636: Performed by: INTERNAL MEDICINE

## 2018-02-01 PROCEDURE — 84132 ASSAY OF SERUM POTASSIUM: CPT | Performed by: STUDENT IN AN ORGANIZED HEALTH CARE EDUCATION/TRAINING PROGRAM

## 2018-02-01 PROCEDURE — 85379 FIBRIN DEGRADATION QUANT: CPT | Performed by: STUDENT IN AN ORGANIZED HEALTH CARE EDUCATION/TRAINING PROGRAM

## 2018-02-01 PROCEDURE — 00000146 ZZHCL STATISTIC GLUCOSE BY METER IP

## 2018-02-01 PROCEDURE — 99233 SBSQ HOSP IP/OBS HIGH 50: CPT | Mod: GC | Performed by: INTERNAL MEDICINE

## 2018-02-01 PROCEDURE — 99222 1ST HOSP IP/OBS MODERATE 55: CPT | Performed by: PSYCHIATRY & NEUROLOGY

## 2018-02-01 PROCEDURE — 25000125 ZZHC RX 250: Performed by: STUDENT IN AN ORGANIZED HEALTH CARE EDUCATION/TRAINING PROGRAM

## 2018-02-01 PROCEDURE — A9585 GADOBUTROL INJECTION: HCPCS | Performed by: INTERNAL MEDICINE

## 2018-02-01 PROCEDURE — 83735 ASSAY OF MAGNESIUM: CPT | Performed by: STUDENT IN AN ORGANIZED HEALTH CARE EDUCATION/TRAINING PROGRAM

## 2018-02-01 PROCEDURE — 12000008 ZZH R&B INTERMEDIATE UMMC

## 2018-02-01 PROCEDURE — 36415 COLL VENOUS BLD VENIPUNCTURE: CPT | Performed by: STUDENT IN AN ORGANIZED HEALTH CARE EDUCATION/TRAINING PROGRAM

## 2018-02-01 PROCEDURE — 40000141 ZZH STATISTIC PERIPHERAL IV START W/O US GUIDANCE

## 2018-02-01 PROCEDURE — 85384 FIBRINOGEN ACTIVITY: CPT | Performed by: STUDENT IN AN ORGANIZED HEALTH CARE EDUCATION/TRAINING PROGRAM

## 2018-02-01 PROCEDURE — 25800025 ZZH RX 258: Performed by: STUDENT IN AN ORGANIZED HEALTH CARE EDUCATION/TRAINING PROGRAM

## 2018-02-01 PROCEDURE — 74183 MRI ABD W/O CNTR FLWD CNTR: CPT

## 2018-02-01 PROCEDURE — 83615 LACTATE (LD) (LDH) ENZYME: CPT | Performed by: STUDENT IN AN ORGANIZED HEALTH CARE EDUCATION/TRAINING PROGRAM

## 2018-02-01 PROCEDURE — 83010 ASSAY OF HAPTOGLOBIN QUANT: CPT | Performed by: STUDENT IN AN ORGANIZED HEALTH CARE EDUCATION/TRAINING PROGRAM

## 2018-02-01 PROCEDURE — 80053 COMPREHEN METABOLIC PANEL: CPT | Performed by: STUDENT IN AN ORGANIZED HEALTH CARE EDUCATION/TRAINING PROGRAM

## 2018-02-01 PROCEDURE — 84100 ASSAY OF PHOSPHORUS: CPT | Performed by: STUDENT IN AN ORGANIZED HEALTH CARE EDUCATION/TRAINING PROGRAM

## 2018-02-01 PROCEDURE — 25000132 ZZH RX MED GY IP 250 OP 250 PS 637: Performed by: INTERNAL MEDICINE

## 2018-02-01 PROCEDURE — 25000125 ZZHC RX 250: Performed by: INTERNAL MEDICINE

## 2018-02-01 RX ORDER — GADOBUTROL 604.72 MG/ML
7.5 INJECTION INTRAVENOUS ONCE
Status: COMPLETED | OUTPATIENT
Start: 2018-02-01 | End: 2018-02-01

## 2018-02-01 RX ORDER — MAGNESIUM SULFATE HEPTAHYDRATE 40 MG/ML
4 INJECTION, SOLUTION INTRAVENOUS ONCE
Status: COMPLETED | OUTPATIENT
Start: 2018-02-01 | End: 2018-02-01

## 2018-02-01 RX ORDER — CLOTRIMAZOLE 1 %
CREAM (GRAM) TOPICAL 2 TIMES DAILY
Status: DISCONTINUED | OUTPATIENT
Start: 2018-02-01 | End: 2018-02-04 | Stop reason: HOSPADM

## 2018-02-01 RX ORDER — NICOTINE POLACRILEX 4 MG
15-30 LOZENGE BUCCAL
Status: DISCONTINUED | OUTPATIENT
Start: 2018-02-01 | End: 2018-02-04 | Stop reason: HOSPADM

## 2018-02-01 RX ORDER — DEXTROSE MONOHYDRATE, SODIUM CHLORIDE, AND POTASSIUM CHLORIDE 50; 2.98; 4.5 G/1000ML; G/1000ML; G/1000ML
INJECTION, SOLUTION INTRAVENOUS CONTINUOUS
Status: DISPENSED | OUTPATIENT
Start: 2018-02-01 | End: 2018-02-02

## 2018-02-01 RX ORDER — DEXTROSE MONOHYDRATE 25 G/50ML
25-50 INJECTION, SOLUTION INTRAVENOUS
Status: DISCONTINUED | OUTPATIENT
Start: 2018-02-01 | End: 2018-02-04 | Stop reason: HOSPADM

## 2018-02-01 RX ORDER — MULTIVITAMIN,THERAPEUTIC
1 TABLET ORAL DAILY
Status: DISCONTINUED | OUTPATIENT
Start: 2018-02-01 | End: 2018-02-04 | Stop reason: HOSPADM

## 2018-02-01 RX ORDER — POTASSIUM CHLORIDE 1500 MG/1
60 TABLET, EXTENDED RELEASE ORAL ONCE
Status: COMPLETED | OUTPATIENT
Start: 2018-02-01 | End: 2018-02-01

## 2018-02-01 RX ORDER — POTASSIUM CL/LIDO/0.9 % NACL 10MEQ/0.1L
10 INTRAVENOUS SOLUTION, PIGGYBACK (ML) INTRAVENOUS ONCE
Status: COMPLETED | OUTPATIENT
Start: 2018-02-01 | End: 2018-02-01

## 2018-02-01 RX ADMIN — FOLIC ACID 1 MG: 1 TABLET ORAL at 08:20

## 2018-02-01 RX ADMIN — MAGNESIUM SULFATE IN WATER 4 G: 40 INJECTION, SOLUTION INTRAVENOUS at 09:41

## 2018-02-01 RX ADMIN — LORAZEPAM 2 MG: 1 TABLET ORAL at 04:35

## 2018-02-01 RX ADMIN — THERA TABS 1 TABLET: TAB at 16:33

## 2018-02-01 RX ADMIN — POTASSIUM PHOSPHATE, MONOBASIC AND POTASSIUM PHOSPHATE, DIBASIC 15 MMOL: 224; 236 INJECTION, SOLUTION INTRAVENOUS at 20:45

## 2018-02-01 RX ADMIN — LORAZEPAM 1 MG: 1 TABLET ORAL at 00:26

## 2018-02-01 RX ADMIN — GADOBUTROL 7.5 ML: 604.72 INJECTION INTRAVENOUS at 20:45

## 2018-02-01 RX ADMIN — Medication 100 MG: at 08:20

## 2018-02-01 RX ADMIN — SODIUM CHLORIDE, POTASSIUM CHLORIDE, SODIUM LACTATE AND CALCIUM CHLORIDE: 600; 310; 30; 20 INJECTION, SOLUTION INTRAVENOUS at 04:35

## 2018-02-01 RX ADMIN — POTASSIUM CHLORIDE, DEXTROSE MONOHYDRATE AND SODIUM CHLORIDE: 300; 5; 450 INJECTION, SOLUTION INTRAVENOUS at 11:44

## 2018-02-01 RX ADMIN — CLOTRIMAZOLE: 10 CREAM TOPICAL at 11:44

## 2018-02-01 RX ADMIN — POTASSIUM PHOSPHATE, MONOBASIC AND POTASSIUM PHOSPHATE, DIBASIC 20 MMOL: 224; 236 INJECTION, SOLUTION INTRAVENOUS at 12:55

## 2018-02-01 RX ADMIN — LORAZEPAM 1 MG: 1 TABLET ORAL at 06:14

## 2018-02-01 RX ADMIN — CLOTRIMAZOLE: 10 CREAM TOPICAL at 20:45

## 2018-02-01 RX ADMIN — HUMAN SECRETIN 13.62 MCG: 16 INJECTION, POWDER, LYOPHILIZED, FOR SOLUTION INTRAVENOUS at 19:39

## 2018-02-01 RX ADMIN — POTASSIUM CHLORIDE 60 MEQ: 1500 TABLET, EXTENDED RELEASE ORAL at 10:45

## 2018-02-01 RX ADMIN — Medication 10 MEQ: at 10:47

## 2018-02-01 RX ADMIN — HUMAN SECRETIN 0.2 MCG: 16 INJECTION, POWDER, LYOPHILIZED, FOR SOLUTION INTRAVENOUS at 19:38

## 2018-02-01 RX ADMIN — DEXTROSE MONOHYDRATE 25 ML: 500 INJECTION PARENTERAL at 09:18

## 2018-02-01 NOTE — PLAN OF CARE
Problem: Patient Care Overview  Goal: Plan of Care/Patient Progress Review  Outcome: No Change  Assumed care at 1900. Alert and oriented x3 (disoriented to date). 1L LR bolus given via PIV. Patient had 50% courtesy meal (was on a regular diet at that time), now NPO. MSSA protocol in place, 1mg Ativan given for MSSA score of 14. Denies pain. Bed alarm on for safety due to generalized weakness. Patient has had a 4+ fall history this winter due to ice. Scabbed areas on bilateral elbows due to falls. Other skin intact. Voiding spontaneously. Bowel sounds present, passing gas, stated last BM was yesterday.     Continue with plan of care - monitor VS, MSSA scores

## 2018-02-01 NOTE — CONSULTS
GASTROENTEROLOGY CONSULTATION      Date of Admission:  1/31/2018          ASSESSMENT AND RECOMMENDATIONS:   Assessment:  52 year old male with a history of alcohol abuse with recurrent alcoholic pancreatitis and history of alcohol withdrawal, TBI, MDD, GERD who was admitted 1/31/2018 with elevated AST/ALT, bilirubin, lipase and concern for choledocholithiasis. His CBD dilation and elevated bilirubin are highly concerning for choledocholithiasis, and in a patient without his ongoing history of alcohol abuse we would likely move directly to ERCP. However, given his increased risk of procedural complications due to his alcohol abuse and steatohepatitis, and it would therefore be worthwhile to have confirmation of a bile duct stone prior to pursuing a procedure.   In addition, it is possible that a portion of his pain is due to acute alcoholic hepatitis, due to his significant alcohol intake history and history of complications 2/2 that intake. In the acute setting, alcoholic pancreatitis can cause RUQ pain.      Recommendations    - Please obtain MRCP to evaluate for retained bile duct stone  - Will re-evaluate for potential ERCP following MRCP  - With his history of cholelithiasis, would recommend evaluation by surgery for potential cholecystectomy  - Agree with CIWA protocol - pt appears to be in early active withdrawal  - Agree with electrolyte replenishment (including Mg as being given), thiamine, folate  - Continue to monitor platelets, no need for platelet transfusion at this time    Gastroenterology outpatient follow up recommendations: Pending clinical course    Thank you for involving us in this patient's care. Please do not hesitate to contact the GI service with any questions or concerns.     Pt care plan discussed with Dr. Romeo, GI staff physician.    Matt Mello MD  PGY-3  Internal Medicine and Emergency  Medicine  -------------------------------------------------------------------------------------------------------------------          Chief Complaint:   We were asked by Dr Lorenzo of Medicine to evaluate this patient with abdominal pain and concern for choledocholithiasis    History is obtained from the patient and the medical record.          History of Present Illness:   Mario Lamar II is a 52 year old male with a history of severe alcohol abuse who was admitted to the hospital with abdominal pain and dilated CBD with concern for choledocholithiasis.     He is a poor historian and it is difficult to determine exact timecourse with him. He does state that he has been drinking 1 bottle of mouthwash and half a gallon of vodka daily since 2015, which was instigated by the death of his wife. He has had issues with recurrent alcoholic pancreatitis and has a known history of cholelithiasis, shown on abd US at [facility] in 2017. Over the past 2 months, he feels as though he has had increased abdominal pain associated with his drinking, worse across the upper abdomen. Yesterday, he was having his normal drinks when he had acute onset of severe epigastric abdominal pain associated with 3x episodes of NBNB vomiting, after which he called an ambulance to present to the ED at [place]. The pain is intermittent, with times when he has no pain at all, and he feels that it came on quite suddenly. He states he has not noticed any yellowing of his eyes or jaundice, but he is noted to have increasing bilirubin over the past week on labs. His last BM was yesterday, he states that it was normal and formed, not lexi colored, no melena or BRB. He denies noticing any change in his urine. No fevers, chills.   Regarding his pain, currently he does not have any, but he feels that this is similar to yesterday, and that the pain has been intermittent throughout his presentation.             Past Medical History:   Reviewed and  edited as appropriate  Past Medical History:   Diagnosis Date     Depression      Pancreatitis      PTSD (post-traumatic stress disorder)      Seizures (H)     withdrawl     Substance abuse     alcohol abuse            Past Surgical History:   Reviewed and edited as appropriate   Past Surgical History:   Procedure Laterality Date     ORTHOPEDIC SURGERY      Right ankle orthopedic surgery after fracture.            Previous Endoscopy:   No results found for this or any previous visit.         Social History:   Reviewed and edited as appropriate  Social History     Social History     Marital status:      Spouse name: N/A     Number of children: N/A     Years of education: N/A     Occupational History     Not on file.     Social History Main Topics     Smoking status: Former Smoker     Packs/day: 0.50     Years: 33.00     Quit date: 10/26/2017     Smokeless tobacco: Former User     Alcohol use Yes      Comment: 1/2 gal vodka every day     Drug use: No     Sexual activity: Yes     Partners: Female     Other Topics Concern     Not on file     Social History Narrative            Family History:   Reviewed and edited as appropriate  Family History   Problem Relation Age of Onset     Alcoholism Sister         No known history of colorectal cancer, liver disease, or inflammatory bowel disease.       Allergies:   Reviewed and edited as appropriate   No Known Allergies         Medications:     No prescriptions prior to admission.    Pt on multiple medications for depression per outside records, but he denies taking any of them         Review of Systems:   A complete review of systems was performed and is negative except as noted in the HPI           Physical Exam:   /75 (BP Location: Left arm)  Pulse 85  Temp 99.4  F (37.4  C) (Oral)  Resp 18  Wt 68.3 kg (150 lb 9.6 oz)  SpO2 95%  BMI 22.57 kg/m2  Wt:   Wt Readings from Last 2 Encounters:   01/31/18 68.3 kg (150 lb 9.6 oz)   12/01/17 68.7 kg (151 lb 6.4 oz)       Constitutional: cooperative, pleasant, not dyspneic/diaphoretic, no acute distress  Eyes: Sclera icteric  Ears/nose/mouth/throat: Normal oropharynx without ulcers or exudate, mucus membranes moist, hearing intact. Tongue fasciculations noted  Neck: supple, thyroid normal size  CV: No edema, RRR no murmur  Respiratory: Unlabored breathing  Lymph: No axillary, submandibular, supraclavicular or inguinal lymphadenopathy  Abd:  Nondistended, +bs, no hepatosplenomegaly, tender to deep palpation in epigastrium and RUQ without rebound or guarding, no peritoneal signs  Skin: warm, perfused  Neuro: AAO x 3, diffuse tremor noted  Psych: Normal affect, poor memory  MSK: No gross deformities         Data:   Labs and imaging below were independently reviewed and interpreted    BMP  Recent Labs  Lab 01/31/18  1018      POTASSIUM 3.0*   CHLORIDE 96   JOHN 8.9   CO2 22   BUN 9   CR 0.46*   GLC 66*     CBC  Recent Labs  Lab 01/31/18  1018   WBC 6.8   RBC 4.48   HGB 14.4   HCT 41.6   MCV 93   MCH 32.1   MCHC 34.6   RDW 15.6*   PLT 62*     INR  Recent Labs  Lab 01/31/18  1018   INR 1.04     LFTs  Recent Labs  Lab 01/31/18  1018   ALKPHOS 261*   *   *   BILITOTAL 3.9*   PROTTOTAL 9.4*   ALBUMIN 4.2      PANC  Recent Labs  Lab 01/31/18  1018   LIPASE 631*       Imaging:

## 2018-02-01 NOTE — PROGRESS NOTES
"Pt admitted from [facility] ED via ambulance. Here d/t c/o's abdominal pain with history of chronic alcoholism, recurrent alcoholic pancreatitis, and cholelithiasis without obstruction or cholecystitis presenting with epigastric pain in context of recent binge drinking (including drinking unspecified type of mouthwash today)(MD note)  Pt oriented to room, call light and phone use. Noted unsteady gait, mildly slurred speech. Alert but disoriented to time which pt is aware of \" I can't remember what the day/date is today\". VSS. Denies pain. Up to bathroom with SBA. Bed alarm in place for safety.  P: continue to monitor status and continue with POC. Ok for bedside report.  "

## 2018-02-01 NOTE — PROGRESS NOTES
"SPIRITUAL HEALTH SERVICES  SPIRITUAL ASSESSMENT Progress Note  [facility]C     REFERRAL SOURCE: Hospital  Request    Mario confirms he would like a  visit but he is \"too tired\" today. He requests a visit tomorrow.    PLAN: [facility] request placed for tomorrow.    Kika [place]  Oncology   Pager [phone]    "

## 2018-02-01 NOTE — PLAN OF CARE
"Problem: Patient Care Overview  Goal: Plan of Care/Patient Progress Review  Outcome: No Change  HD1 with history of alcohol abuse (1 bottle of mouth wash, and half a gallon of vodka daily since 2015); recurrent alcoholic pancreatitis, cholelithiasis who presented to the [place] ED on 1/31 via ambulance for abdominal pain found to have dilated CBD to 11 mm on RUQ US and admitted to the hospital for ERCP procedure by GI on 2/1/18 (per MD note).  VS: AVSS   Neuro: A+Ox4.On MSSA/CIWA-Ar protocol, tremors noticeable when arms are up, unsteady gait.  No ativan given. this shift.   GI/: Rounded, passing flatus, no BM this shift. Large uop, icteric yellow in color.  Diet/appetite: NPO except meds. No c/o's N/V.  Activity: Up with assist of 1, pt is unsteady on his feet. Needs coaching in doing some ADLS.  Pain: Denies pain.  Skin/Eyes:  Has psoriatic rash all over body, black scab on left left elbow(from previous falls). Noted smelly whitish/pale skin in between toes-MD notified, lotrimin ordered. Immediate improvement in odor noted after application of lotrimin ointment. Yellowish on both sclera.  Lines: PIV x2 infusing MIV and phos replacement.  Mag and Potassium replaced. Slept mostly this shift after shower \"feel tired\". Bed alarm for safety. For MRCP/MRAbd then maybe ERCP. BG was low in the morning, D50 25ml given with improvement, MIV with dextrose.  P: Continue to monitor status and continue with POC.  Ok for bedside report.        "

## 2018-02-01 NOTE — PROGRESS NOTES
"  INTERNAL MEDICINE PROGRESS NOTE    Date of Service: February 1, 2018            Assessment and Plan:     Mario Lamar II is a 52 year old male with history significant for EtOH abuse with recurrent alcoholic pancreatitis and history of alcohol withdrawal who was admitted 1/31/2018 with elevated AST/ALT, bilirubin, lipase and concern for choledocholithiasis.     # Choledocholithiasis  Patient presented with abdominal pain and CBD dilatation concerning for choledocholithiasis. No infectious symptoms concerning for cholangitis. Per GI, \"given his increased risk of procedural complications due to his alcohol abuse and steatohepatitis, and it would therefore be worthwhile to have confirmation of a bile duct stone prior to pursuing a procedure.\"   - GI consulted, appreciate recs  - MRCP pending  - Potential ERCP in AM  - Discuss with surgery the need for CCY     # EtOH hepatitis  Maddrey's discriminatory function 20. Patient does not require steroids at this time. Consider liver consult if patient decompensates.  - continue to monitor and treat the underlying disease     # EtOH abuse  # EtOH withdrawal  Last drink was 1/29. Patient is at high risk for EtOH withdrawal. Will monitor closely and apply MSSA protocol as needed. Patient is agreeable with meeting with chemical dependency.  - MSSA protocol   - thiamine and folate    - chemical dependency consulted, appreciate recs  - social work consulted, appreciate recs     # Electrolyte imbalance  2/2 EtOH abuse and chronic malnutrition. Patient presented with low Mg, K, and Phos.   - replace electrolytes as needed  - phos protocol in place     Chronic medical problems:   # Depression  Apparently was told to take multiple medications including escitalopram, hydroxyzine, mirtazapine but he doesn't take any of these. He states he is not motivated. Might warrant SW consult prior to discharge. Will hold for now     FEN: NPO, D5 1/2NS  PPx: low risk  Code: Full " "Code  Dispo: Patient will be inpatient for >2 midnights due to possible GI procedure.    Patient staffed with Dr. Solomon, who agrees with above plans.    Yaakov Basurto MD  Internal Medicine, PGY-2  [phone]  -----------------------------------------------------------------------------------------------------------------------------------------------    Interval History    No acute events overnight. Patient was asleep this AM. Patient denied any pain this AM to me. No other complaints.     Review of Systems:   A 4 point review of systems was negative except as noted above.    OBJECTIVE:  VS: Vital signs:  Temp: 99  F (37.2  C) Temp src: Oral BP: 127/72 Pulse: 88 Heart Rate: 94 Resp: 15 SpO2: 94 % O2 Device: None (Room air) Oxygen Delivery: 2 LPM   Weight: 68.3 kg (150 lb 9.6 oz)  Estimated body mass index is 22.57 kg/(m^2) as calculated from the following:    Height as of 11/27/17: 1.74 m (5' 8.5\").    Weight as of this encounter: 68.3 kg (150 lb 9.6 oz).      Intake/Output Summary (Last 24 hours) at 02/01/18 0720  Last data filed at 02/01/18 0659   Gross per 24 hour   Intake             2100 ml   Output             2125 ml   Net              -25 ml       GENERAL APPEARANCE: alert and no distress  Pulmonary: CTAB  CV: regular rhythm, normal rate, no murmur, no rub  GI: soft, nontender, ND  Ext: no edema  Skin: jaundiced, diffuse tattoos   NEURO: mentation intact and speech normal, equally move    Labs:   All labs reviewed by me  Electrolytes/Renal - Recent Labs   Lab Test  01/31/18   1018  12/01/17   0642  11/30/17   0815  11/29/17   0530  11/28/17   0531   11/26/17   1129   NA  136   --   136   --   133   < >  139   POTASSIUM  3.0*  4.3  3.8  3.5  3.4   < >  3.3*   CHLORIDE  96   --   101   --   100   < >  99   CO2  22   --   25   --   25   < >  20   BUN  9   --   13   --   5*   < >  13   CR  0.46*   --   0.55*   --   0.63*   < >  0.56*   GLC  66*   --   110*   --   91   < >  71   JOHN  8.9   --   9.3   --   8.4*   " < >  8.5   MAG  1.8   --   2.0  1.9  1.8   < >  1.2*   PHOS   --    --    --    --    --    --   2.6    < > = values in this interval not displayed.     CBC -   Recent Labs   Lab Test  01/31/18   1018  11/28/17   0531  11/27/17   0658   WBC  6.8  5.8  4.7   HGB  14.4  14.0  13.7   PLT  62*  83*  84*     LFTs Recent Labs   Lab Test  01/31/18   1018  11/28/17   0531  11/27/17   0658   ALKPHOS  261*  160*  145   BILITOTAL  3.9*  2.0*  2.7*   ALT  132*  125*  96*   AST  345*  218*  144*   PROTTOTAL  9.4*  7.7  7.6   ALBUMIN  4.2  3.2*  3.1*       Current Medications:    potassium chloride  20 mEq Oral Once     thiamine  100 mg Oral Daily     folic acid  1 mg Oral Daily     sodium chloride (PF)  3 mL Intracatheter Q8H       lactated ringers 200 mL/hr at 02/01/18 0435

## 2018-02-01 NOTE — PROGRESS NOTES
"Social Work Services Progress Note    Hospital Day: 2  Date of Initial Social Work Evaluation:  Not yet completed during this admission; has previous assessment completed on 11/29/17 by RIMA Mckeon.  Collaborated with:  Chart Review, Patient, Bedside RN (Lakisha)    Data:  Pt is 51 y/o male admitted to [facility] on 1/31/18 as transfer from [place] ED for ERCP procedure today. Pt has hx of alcohol abuse (1 bottle of outh wash, and half a gallon of vodka daily since 2015), recurring alcoholic pancreatitis, and cholelithiasis.   SW completed chart review and Pt could likely benefit from resources for CD and MH; he has been offered resources in the past.    Intervention:  SW attempted to complete social work assessment today. Pt reported feeling \"tired\" and was moving/speaking slowly. Pt asked SW to return tomorrow; SW to f/u.    SW updated Bedside RN (Lakisha).    Assessment:  See bedside RN and medical team notes.     Plan:    Anticipated Disposition:  TBD, pending medical course and recommendations    Barriers to d/c plan:  Medical stability    Follow Up:  SW to continue to follow and assist as needed    RIMA Scott, LGSW   Surgical Oncology Unit   [phone]  Pager: [phone]    "

## 2018-02-01 NOTE — H&P
[facility] [place]    History and Physical     Date of Admission:  1/31/2018  Date of Service (when I saw the patient): 01/31/18    Assessment & Plan   Mario Lamar II is a 52 year old male with history of alcohol abuse (1 bottle of mouth wash, and half a gallon of vodka daily since 2015); recurrent alcoholic pancreatitis, cholelithiasis who presented to the [place] ED on 1/31 via ambulance for abdominal pain found to have dilated CBD to 11 mm on RUQ US and admitted to the hospital for ERCP procedure by GI on 2/1/18    # Acute hepatitis: differential diagnosis is likely  obstructive hepatobiliary pathology given dilated CBD on RUQ US and hx of cholelithiasis vs less likely alcohol hepatitis vs others.   - GI (Pancbili) consulted for possible ERCP 2/1  - NPO @ [facility]  - AM CMP    # Acute on chronic pancreatitis: lipase 631. Likely a combination of alcohol use and or obstructive biliary pathology given dilated CBD.   - NPO  - pain control with dilaudid   - fluid; bolus and 200 cc/hr of mIVF   - compazine for nausea    # Alcohol abuse and alcohol withdrawal: drinks 1 bottle of mouth wash and half a gallon of vodka daily. Has hx of alcohol withdrawals.   - [facility] protocol   - thiamine and folate     # Electrolyte imbalance: I.e Hypokalemia: k 3  - replaced as needed   - daily lytes check including phos and mg for possible refeeding syndrome given chronic alcohol abuse and malnutrition     # Chronic medical problems:   Depression: Apparently was told to take multiple medications including escitalopram, hydroxyzine, mirtazapine but he doesn't take any of these. He states he is not motivated. Might warrant SW consult prior to discharge. Will hold for now    FEN: 200 cc/hr mIVF; replace lyts prn; regular diet now and NPO @ [facility]  PPx: low risk  Code: Full Code  Dispo: Patient will be inpatient for >2 midnights due to possible GI procedure.    Patient seen and discussed with Dr. Barcenas who  "agrees with the above assessment and plan.    Thu Lorenzo  PGY-1 IM   [id number]    ATTENDING ATTESTATION  Patient seen, examined and discussed with resident(s) or advanced practice provider. I agree with the key elements of findings and plan described above, with pertinent additions, amendments and/or updates as included in text below.     Edits made as needed to plan above.    Stew Barcenas MD  [facility]      Primary Care Physician   Elle Aguilar    Chief Complaint   Transferred from [facility] for dilated CBD  History of Present Illness   History obtained from chart review and discussed with patient.    Mario Lamar II is a 52 year old male with history of alcohol abuse (1 bottle of mouth wash, and half a gallon of vodka daily since 2015); recurrent alcoholic pancreatitis, cholelithiasis who presented to the [place] ED on 1/31 via ambulance for abdominal pain. Pt initially stated his regular drink today and had abdominal pain after which he asked his apartment office to call the ambulance, but later towards the end of the history he stated his last drink was on Monday after which he presented to [facility]. He describes his abdominal pain mostly diffused but at times epigastric. He has nausea but no emesis. He does have regular bowel movements. Have had alcohol withdraws in the past in which he feels shaky, sweaty but doesn't endorse seizure problems. He states he started drinking after his wife passed away in 2015. He has 11 children. He does live near his sister who also drinks \"more than me.\" He does have a primary care physician at the [facility]. He does have psoriasis and depression in which he does not take prescribed medication.     After he arrived to the [place] ED pt had abnormal LFTs and elevated lipase. RUQ US was obtained that found dilated CBD to 11 mm. Pt was transferred to [facility] for possible ERCP on 2/1.       Past Medical History "    I have reviewed this patient's medical history and updated it with pertinent information if needed.   Past Medical History:   Diagnosis Date     Depression      Pancreatitis      PTSD (post-traumatic stress disorder)      Seizures (H)     withdrawl     Substance abuse     alcohol abuse       Past Surgical History   I have reviewed this patient's surgical history and updated it with pertinent information if needed.  Past Surgical History:   Procedure Laterality Date     ORTHOPEDIC SURGERY      Right ankle orthopedic surgery after fracture.       Prior to Admission Medications   None     Allergies   No Known Allergies    Social History   I have reviewed this patient's social history and updated it with pertinent information if needed. Mario Toro Willard II  reports that he quit smoking about 3 months ago. He has a 16.50 pack-year smoking history. He has quit using smokeless tobacco. He reports that he drinks alcohol. He reports that he does not use illicit drugs.    Family History   I have reviewed this patient's family history and updated it with pertinent information if needed.   Family History   Problem Relation Age of Onset     Alcoholism Sister        Review of Systems   The 10 point Review of Systems is negative other than noted in the HPI     Physical Exam   /77 (BP Location: Right arm)  Pulse 85  Temp 97.5  F (36.4  C) (Oral)  Resp 16  Wt 68.3 kg (150 lb 9.6 oz)  SpO2 96%  BMI 22.57 kg/m2  Vitals:    01/31/18 1611 01/31/18 1837   Weight: 73.6 kg (162 lb 3.2 oz) 68.3 kg (150 lb 9.6 oz)       GENERAL: Pleasant, seen resting comfortably in bed in NAD. Alert and oriented to self, place, time, and condition. Interactive. Some tremor noted    HEENT: NCAT. PERRL, EOMI, anicteric sclera. Oral mucosa pink and moist with no lesions or thrush.  LUNG: Non-labored breathing, good air exchange, lungs clear to auscultation bilaterally. No cough or wheeze noted.   CV: Regular rate and rhythm. No murmur  or rub.   GI: Normoactive bowel sounds. Not-distended, diffusely tender and does guard,   SKIN: Warm and dry. No concerning lesions or rash on exposed surfaces. Does have scaly, erythematous skin on extensor joints from his chronic psoriasis.    MSK: Extremities grossly normal, non-tender, no edema. Strong peripheral pulses. Good strength and ROM in bed.   NEURO: A&O x 3. CNs 2-12 grossly intact, speech normal, gait normal, sensation to light touch grossly WNL. Grossly non-focal.    Data   CBC  Recent Labs  Lab 01/31/18  1018   WBC 6.8   RBC 4.48   HGB 14.4   HCT 41.6   MCV 93   MCH 32.1   MCHC 34.6   RDW 15.6*   PLT 62*     CMP  Recent Labs  Lab 01/31/18  1018      POTASSIUM 3.0*   CHLORIDE 96   CO2 22   ANIONGAP 18*   GLC 66*   BUN 9   CR 0.46*   GFRESTIMATED >90   GFRESTBLACK >90   JOHN 8.9   MAG 1.8   PROTTOTAL 9.4*   ALBUMIN 4.2   BILITOTAL 3.9*   ALKPHOS 261*   *   *     INR  Recent Labs  Lab 01/31/18  1018   INR 1.04       Results for orders placed or performed during the hospital encounter of 01/31/18   Abdomen US, limited (RUQ only)    Narrative    ULTRASOUND ABDOMEN LIMITED 1/31/2018 12:13 PM     HISTORY: Evaluate for cholecystitis or obstruction. Abdominal pain and  elevated LFTs and lipase.     FINDINGS:  Liver is increased in echogenicity without focal lesions.  The gallbladder contains sludge but no shadowing gallstones. Common  bile duct dilated at 11 mm proximally and slightly greater than 6 mm  distally. Distally obstructing stone or mass cannot be completely  ruled out. Pancreas is obscured by overlying bowel gas and shadowing  from the fatty liver. Examination of the right kidney is unremarkable.      Impression    IMPRESSION:    1. Dilated common bile duct up to 11 mm, possibly reflecting distal  common bile duct stone or obstructing mass. Distal duct is difficult  to visualize due to shadowing.  2. Fatty infiltration of the liver.    BETTY YEE MD
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
## 2018-02-01 NOTE — PLAN OF CARE
Problem: Patient Care Overview  Goal: Plan of Care/Patient Progress Review  Outcome: No Change  /72 (BP Location: Left arm)  Pulse 88  Temp 99  F (37.2  C) (Oral)  Resp 15  Wt 68.3 kg (150 lb 9.6 oz)  SpO2 94%  BMI 22.57 kg/m2    A/Ox4. VSS on 2L O2. Up with assist x1; pt is unsteady on feet. Abrasions on both elbows from recent falls. NPO since midnight for possible ERCP today. Pt's MSSA scores were 9, 16, and 10; total of 4mg Ativan given throughout the night with good relief. R PIV with LR@200mL/hr. Urine output 1300mL and BM x1. Will continue to monitor and notify the team of any changes.
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
## 2018-02-02 ENCOUNTER — APPOINTMENT (OUTPATIENT)
Dept: OCCUPATIONAL THERAPY | Facility: CLINIC | Age: 53
End: 2018-02-02
Payer: COMMERCIAL

## 2018-02-02 LAB
ALBUMIN SERPL-MCNC: 3.6 G/DL (ref 3.4–5)
ALP SERPL-CCNC: 306 U/L (ref 40–150)
ALT SERPL W P-5'-P-CCNC: 127 U/L (ref 0–70)
ANION GAP SERPL CALCULATED.3IONS-SCNC: 10 MMOL/L (ref 3–14)
AST SERPL W P-5'-P-CCNC: 272 U/L (ref 0–45)
BILIRUB SERPL-MCNC: 11.4 MG/DL (ref 0.2–1.3)
BUN SERPL-MCNC: 3 MG/DL (ref 7–30)
CALCIUM SERPL-MCNC: 9.2 MG/DL (ref 8.5–10.1)
CHLORIDE SERPL-SCNC: 96 MMOL/L (ref 94–109)
CO2 SERPL-SCNC: 24 MMOL/L (ref 20–32)
CREAT SERPL-MCNC: 0.4 MG/DL (ref 0.66–1.25)
ERYTHROCYTE [DISTWIDTH] IN BLOOD BY AUTOMATED COUNT: 15.6 % (ref 10–15)
GFR SERPL CREATININE-BSD FRML MDRD: >90 ML/MIN/1.7M2
GLUCOSE SERPL-MCNC: 111 MG/DL (ref 70–99)
HCT VFR BLD AUTO: 40.3 % (ref 40–53)
HGB BLD-MCNC: 13.8 G/DL (ref 13.3–17.7)
INR PPP: 1.07 (ref 0.86–1.14)
MAGNESIUM SERPL-MCNC: 2.1 MG/DL (ref 1.6–2.3)
MCH RBC QN AUTO: 31.9 PG (ref 26.5–33)
MCHC RBC AUTO-ENTMCNC: 34.2 G/DL (ref 31.5–36.5)
MCV RBC AUTO: 93 FL (ref 78–100)
PHOSPHATE SERPL-MCNC: 1.4 MG/DL (ref 2.5–4.5)
PHOSPHATE SERPL-MCNC: 3.1 MG/DL (ref 2.5–4.5)
PLATELET # BLD AUTO: 40 10E9/L (ref 150–450)
POTASSIUM SERPL-SCNC: 3.6 MMOL/L (ref 3.4–5.3)
PROT SERPL-MCNC: 8.1 G/DL (ref 6.8–8.8)
RBC # BLD AUTO: 4.33 10E12/L (ref 4.4–5.9)
SODIUM SERPL-SCNC: 130 MMOL/L (ref 133–144)
WBC # BLD AUTO: 5.9 10E9/L (ref 4–11)

## 2018-02-02 PROCEDURE — 84100 ASSAY OF PHOSPHORUS: CPT | Performed by: STUDENT IN AN ORGANIZED HEALTH CARE EDUCATION/TRAINING PROGRAM

## 2018-02-02 PROCEDURE — 25000132 ZZH RX MED GY IP 250 OP 250 PS 637: Performed by: STUDENT IN AN ORGANIZED HEALTH CARE EDUCATION/TRAINING PROGRAM

## 2018-02-02 PROCEDURE — 97530 THERAPEUTIC ACTIVITIES: CPT | Mod: GO

## 2018-02-02 PROCEDURE — 80053 COMPREHEN METABOLIC PANEL: CPT | Performed by: STUDENT IN AN ORGANIZED HEALTH CARE EDUCATION/TRAINING PROGRAM

## 2018-02-02 PROCEDURE — 25000132 ZZH RX MED GY IP 250 OP 250 PS 637: Performed by: INTERNAL MEDICINE

## 2018-02-02 PROCEDURE — 25000125 ZZHC RX 250: Performed by: INTERNAL MEDICINE

## 2018-02-02 PROCEDURE — 25000128 H RX IP 250 OP 636: Performed by: INTERNAL MEDICINE

## 2018-02-02 PROCEDURE — 85610 PROTHROMBIN TIME: CPT | Performed by: STUDENT IN AN ORGANIZED HEALTH CARE EDUCATION/TRAINING PROGRAM

## 2018-02-02 PROCEDURE — 36415 COLL VENOUS BLD VENIPUNCTURE: CPT | Performed by: STUDENT IN AN ORGANIZED HEALTH CARE EDUCATION/TRAINING PROGRAM

## 2018-02-02 PROCEDURE — 97165 OT EVAL LOW COMPLEX 30 MIN: CPT | Mod: GO

## 2018-02-02 PROCEDURE — 12000008 ZZH R&B INTERMEDIATE UMMC

## 2018-02-02 PROCEDURE — 85027 COMPLETE CBC AUTOMATED: CPT | Performed by: STUDENT IN AN ORGANIZED HEALTH CARE EDUCATION/TRAINING PROGRAM

## 2018-02-02 PROCEDURE — 99233 SBSQ HOSP IP/OBS HIGH 50: CPT | Mod: GC | Performed by: INTERNAL MEDICINE

## 2018-02-02 PROCEDURE — 40000133 ZZH STATISTIC OT WARD VISIT

## 2018-02-02 PROCEDURE — 83735 ASSAY OF MAGNESIUM: CPT | Performed by: STUDENT IN AN ORGANIZED HEALTH CARE EDUCATION/TRAINING PROGRAM

## 2018-02-02 PROCEDURE — 97535 SELF CARE MNGMENT TRAINING: CPT | Mod: GO

## 2018-02-02 PROCEDURE — 25000128 H RX IP 250 OP 636: Performed by: STUDENT IN AN ORGANIZED HEALTH CARE EDUCATION/TRAINING PROGRAM

## 2018-02-02 RX ORDER — POLYMYXIN B SULFATE AND TRIMETHOPRIM 1; 10000 MG/ML; [USP'U]/ML
1 SOLUTION OPHTHALMIC
Status: DISCONTINUED | OUTPATIENT
Start: 2018-02-02 | End: 2018-02-03

## 2018-02-02 RX ORDER — PANTOPRAZOLE SODIUM 40 MG/1
40 TABLET, DELAYED RELEASE ORAL EVERY MORNING
Status: DISCONTINUED | OUTPATIENT
Start: 2018-02-02 | End: 2018-02-04 | Stop reason: HOSPADM

## 2018-02-02 RX ADMIN — POTASSIUM PHOSPHATE, MONOBASIC AND POTASSIUM PHOSPHATE, DIBASIC 20 MMOL: 224; 236 INJECTION, SOLUTION INTRAVENOUS at 16:08

## 2018-02-02 RX ADMIN — LORAZEPAM 2 MG: 1 TABLET ORAL at 09:36

## 2018-02-02 RX ADMIN — Medication 100 MG: at 07:59

## 2018-02-02 RX ADMIN — HYPROMELLOSE: 0 GEL OPHTHALMIC at 12:43

## 2018-02-02 RX ADMIN — POLYMYXIN B SULFATE AND TRIMETHOPRIM 1 DROP: 1; 10000 SOLUTION OPHTHALMIC at 19:51

## 2018-02-02 RX ADMIN — CLOTRIMAZOLE: 10 CREAM TOPICAL at 07:59

## 2018-02-02 RX ADMIN — PANTOPRAZOLE SODIUM 40 MG: 40 TABLET, DELAYED RELEASE ORAL at 16:08

## 2018-02-02 RX ADMIN — Medication 0.4 MG: at 07:59

## 2018-02-02 RX ADMIN — THERA TABS 1 TABLET: TAB at 07:59

## 2018-02-02 RX ADMIN — CLOTRIMAZOLE: 10 CREAM TOPICAL at 19:51

## 2018-02-02 RX ADMIN — LORAZEPAM 2 MG: 1 TABLET ORAL at 16:19

## 2018-02-02 RX ADMIN — FOLIC ACID 1 MG: 1 TABLET ORAL at 07:59

## 2018-02-02 ASSESSMENT — PAIN DESCRIPTION - DESCRIPTORS
DESCRIPTORS: CRAMPING
DESCRIPTORS: ACHING

## 2018-02-02 ASSESSMENT — ACTIVITIES OF DAILY LIVING (ADL): PREVIOUS_RESPONSIBILITIES: MEAL PREP;LAUNDRY;HOUSEKEEPING;SHOPPING;MEDICATION MANAGEMENT;FINANCES

## 2018-02-02 NOTE — PROGRESS NOTES
"[facility]  GI-HEPATOLOGY PROGRESS NOTE  Mario Toro Willard II [id number]       CC: jaundice  SUBJECTIVE:  Has appetite but has been NPO for potential procedure, intermittent nausea. Denies fevers, abdominal pain, melena or hematochezia. He notes having mild abdominal distention. He reports that he \"needs to quick drinking\" but has concerns that none of his family cares for him other than his aunt.     ROS:  A 10-point review of systems was negative.    OBJECTIVE:  VS: /84 (BP Location: Left arm)  Pulse 85  Temp 99.1  F (37.3  C)  Resp 16  Ht 1.74 m (5' 8.5\")  Wt 68.1 kg (150 lb 3.2 oz)  SpO2 93%  BMI 22.51 kg/m2   General: In no acute distress, mild facial muscle wasting  Neuro: AOx3, No asterixis. Tremulous  Lymph: No cervical lymphadenopathy  HEENT:  Mild scleral icterus, Nooral lesions  CV: S1/E6eavcfjg murmurs. Skin warm and dry  Lungs: clear to auscultation Respirations even and nonlabored on room air  Abd: Mildly distended, nontender. +BS  Extrem: Noperipehral edema  Skin: mild jaundice  Psych: Cooperative    MEDICATIONS:  Current Facility-Administered Medications   Medication     potassium phosphate 10 mmol in D5W 250 mL intermittent infusion     potassium phosphate 15 mmol in D5W 250 mL intermittent infusion     potassium phosphate 20 mmol in D5W 500 mL intermittent infusion     potassium phosphate 20 mmol in D5W 250 mL intermittent infusion     potassium phosphate 25 mmol in D5W 500 mL intermittent infusion     glucose 40 % gel 15-30 g    Or     dextrose 50 % injection 25-50 mL    Or     glucagon injection 1 mg     clotrimazole (LOTRIMIN) 1 % cream     multivitamin, therapeutic (THERA-VIT) tablet 1 tablet     potassium chloride (KLOR-CON) Packet 20 mEq     LORazepam (ATIVAN) tablet 1-4 mg     folic acid (FOLVITE) tablet 1 mg     HYDROmorphone (DILAUDID) injection 0.2-0.4 mg     naloxone (NARCAN) injection 0.1-0.4 mg     lidocaine 1 % 1 mL     lidocaine (LMX4) kit     sodium " chloride (PF) 0.9% PF flush 3 mL     sodium chloride (PF) 0.9% PF flush 3 mL       REVIEW OF LABORATORY, PATHOLOGY AND IMAGING RESULTS:  BMP  Recent Labs  Lab 02/02/18  0721 02/01/18  1445 02/01/18  0752 01/31/18  1018   *  --  131* 136   POTASSIUM 3.6 3.9 3.2* 3.0*   CHLORIDE 96  --  94 96   JOHN 9.2  --  8.0* 8.9   CO2 24  --  21 22   BUN 3*  --  3* 9   CR 0.40*  --  0.39* 0.46*   *  --  54* 66*     CBC  Recent Labs  Lab 02/02/18  0721 02/01/18  0752 01/31/18  1018   WBC 5.9 5.2 6.8   RBC 4.33* 3.82* 4.48   HGB 13.8 11.9* 14.4   HCT 40.3 35.7* 41.6   MCV 93 94 93   MCH 31.9 31.2 32.1   MCHC 34.2 33.3 34.6   RDW 15.6* 15.8* 15.6*   PLT 40* 43* 62*     INR  Recent Labs  Lab 02/02/18  0721 02/01/18  0752 01/31/18  1018   INR 1.07 1.10 1.04     LFTs  Recent Labs  Lab 02/02/18  0721 02/01/18  0752 01/31/18  1018   ALKPHOS 306* 251* 261*   * 323* 345*   * 121* 132*   BILITOTAL 11.4* 7.2* 3.9*   PROTTOTAL 8.1 7.3 9.4*   ALBUMIN 3.6 3.1* 4.2      PANC  Recent Labs  Lab 01/31/18  1018   LIPASE 631*      MELD-Na score: 22 at 2/2/2018  7:21 AM  MELD score: 17 at 2/2/2018  7:21 AM  Calculated from:  Serum Creatinine: 0.40 mg/dL (Rounded to 1) at 2/2/2018  7:21 AM  Serum Sodium: 130 mmol/L at 2/2/2018  7:21 AM  Total Bilirubin: 11.4 mg/dL at 2/2/2018  7:21 AM  INR(ratio): 1.07 at 2/2/2018  7:21 AM  Age: 52 years      Imaging Results:  MRCP 2/1/18  IMPRESSION:   1. No intra or extrahepatic biliary ductal dilatation. Main pancreatic  duct is nondilated. No features of acute pancreatic inflammation.  2. No pancreatic mass.  3. Hepatic steatosis with diffuse mildly heterogeneous arterial phase  enhancement throughout the hepatic parenchyma, compatible with  provided history of hepatitis. Heterogeneous/reticular delayed phase  hepatic parenchymal enhancement and signal hyperintensity compatible  with underlying chronic parenchymal disease. No worrisome hepatic  mass.  4. Sludge filled gallbladder with a  thickened/edematous gallbladder  wall, which may be reactive, given adjacent hepatitis. However, if  there is clinical concern for acute cholecystitis, a nuclear medicine  hepatobiliary scan may be confirmatory  5. Mildly impaired pancreatic ductal dilatation in response to  secretin which may indicate a degree of fibrosis; pancreas appears  grossly normal however.  Pancreatic exocrine function is preserved.    IMPRESSION:  Mario Lamar II is a 52 year old male with a history of alcohol hepatitis complicated by depression, anxiety and alcohol pancreatitis. He had some initial biliary duct dilation with repeat imaging no evidence of bilary duct obstruction. His Maddrey score is low.     RECOMMENDATIONS:  1. Alcohol hepatitis  - Maddrey score low in teens. MELD 22. No steroids at this time.   - not a transplant candidate. Has been seen by psych for mental status eval and CD eval.   - continue folic acid and thiamine  - encourage increased oral intake of good nutrition. Protein supplements if unable to take adequate nutrition.     Patient was discussed with attending physician, Dr. Fitzgerald.      Thank you for the opportunity to be involved with  Mario Lamar II care. Please call with any questions or concerns.    Suzie Eller, APRN, CNP  [phone]

## 2018-02-02 NOTE — PLAN OF CARE
Problem: Patient Care Overview  Goal: Plan of Care/Patient Progress Review  Outcome: No Change  VSS. MSSA score 7 @ 2100. Recheck in 4 hours.  Voiding adequate amounts. Denies pain/nausea. NPO.  Sleeping most of shift. MRI done. Lotrimin cream applied to feet. Potassium Phos 15 mmol started infusing 2145 over 5 hours. MIV @ 75 ml/hr.

## 2018-02-02 NOTE — PLAN OF CARE
Problem: Patient Care Overview  Goal: Plan of Care/Patient Progress Review  Outcome: Improving    Alert/oriented, mentation clear, no confusion. MSSA score <8 x2 overnight. No tremors noted. Bed alarm for safety, pt using call light appropriately.   Denies pain but c/o leg cramping, this improved after a walk around unit with SBA.   NPO, denies nausea.  Voiding good amts, 1 BM.  Bilat eyes red, itchy, watery. Scabs on upper and lower L eyelids.

## 2018-02-02 NOTE — CONSULTS
"Social Work: Assessment with Discharge Plan    Patient Name:  Mario Lamar II  :  1965  Age:  52 year old  MRN:  [id number]  Risk/Complexity Score:  Filed Complexity Screen Score: 10  Completed assessment with:  Patient, Chart Review    Presenting Information   Reason for Referral:  Discharge plan, Substance abuse concerns and Potential need for community services upon discharge  Date of Intake:  2018  Referral Source:  Physician, Nurse, Patient and Chart Review  Decision Maker:  Patient  Alternate Decision Maker:  Defer to [facility]  Health Care Directive:  None  Living Situation:  Pt lives in section 8 housing at [facility] where he just moved into on 12/15/17. He said that it is an efficiency apartment where he lives independently with no services.  Previous Functional Status:  Independent  Patient and family understanding of hospitalization:    Pt demonstrates good understanding.  Cultural/Language/Spiritual Considerations:  Pt is a [facility] . English speaking.  Adjustment to Illness:  Pt reports \"I need to take care of my liver.\"     Physical Health  Reason for Admission:    1. Biliary obstruction    2. Alcohol dependence with withdrawal with complication (H)    3. Acute pancreatitis, unspecified complication status, unspecified pancreatitis type    4. Acute biliary pancreatitis, unspecified complication status      Services Needed/Recommended:  Chemical Dependency treatment    Mental Health/Chemical Dependency  Diagnosis: Per chart review, Pt has DX Depression and PTSD. Pt also reports being diagnosed with Anxiety and Post-Acute Withdrawal Syndrome.  Pt reports that he was first diagnosed with depression when he was approx. 18 years old. He said that at that time he had made a \"suicide pact\" with his friends and now he is the only one still alive.   Pt reports no current or recent thoughts of suicidal or homicidal ideation. He reports that he has had " "suicidal ideation in the past, and has called the  on himself in that case.   He said that he has had multiple inpatient psych stays, but was unable to recall how many. He said that he was recently at IP MH treatment at [facility].   Support/Services in Place:  Pt reports that he sees Tom Alex, counseling care coordinator, at [facility]  for therapy. He said that he last saw Tom last month, but tries to see Tom 3x/month.  He also reports that he sees his PCP, Dr. Elle Aguilar, at [facility] for his MH needs.  He reports that he has been prescribed various psychotropic medications but that he does not take them. When SW inquired why, he reported \"because I'm drunk all the time\" and he doesn't want to \"go to bed and not wake up\" because he is mixing medications with alcohol. He reports that Tom and his PCP are both aware that he does not take his psychotropic medications.  Services Needed/Recommended:  Recommend continued mental health support through [facility].     Chemical Dependency  Diagnosis: ETOH abuse.  Per chart review, Pt has been drinking heavily since at least age 19. Pt reported that he has increased his drinking since 4/21/15 when his wife passed away. He reports his drink of choice is vodka, and typically drinks 1 liter-1/2 gallon of vodka every day; he drinks with friends as well as by himself.   Support/Services in Place:    Pt reports he has been to IP CD treatment 7x during his lifetime. He reports no significant time of sobriety. He said that his last IP tx was at [facility] in [place] sometime in the beginning of 2017 and he was there for 120 days. He reports he was sober for only a short time after leaving the treatment center.   He reports being very familiar with Rule 25 assessment and knows how to have this completed to pursue treatment. SW provided him with list of resources of places in [facility] that complete assessment.  Pt reports " "no interest in pursuing treatment at this time, but that he may consider it. He was glad to have the resources SW provided.  Services Needed/Recommended:  Rec. CD treatment, however Pt declining this pursue this at this time.    Support System  Significant relationship at present time:  Limited social supports. He reports he has friends, but that he drinks with them frequently.  Family of origin is available for support:  Limited. He reports an involved sister, but that she is also an alcoholic and they are not getting along at the moment.  Other support available:  Yes - reports he has a  through shelter services (Andrea DAVIES [phone]); has a  through [employer] ([place] [phone]), and a [employer]  whose name/number he does not know.  Gaps in support system:  Yes  Patient is caregiver to:  None     Provider Information   Primary Care Physician:  Elle Aguilar   [phone]   Clinic:  [address] *      :  Yes - reports he has a  through shelter services (Andrea DAVIES [phone]); has a  through [employer] ([place]atin [phone]), and a [employer] [facility]  whose name/number he does not know.    Financial   Income Source:  Unemployed  Financial Concerns:  Does not report concerns at this time.  Insurance:    Payor/Plan Subscriber Name Rel Member # Group #   [facility] ARCADIOCARMINEELIZABETHJASON*  [id number]      [address] [place] [address] 201       Discharge Plan   Patient and family discharge goal:  Pt plans to d/c to his apartment independently when medically stable.  Pt does not plan to pursue CD treatment at this time, but has resources if he chooses to pursue in the future. Pt reports he needs to \"take care of my liver\" before he would consider going into treatment.   Provided education on discharge plan:  YES  Patient agreeable to discharge plan:  YES  Barriers to " discharge:  Medical stability    Discharge Recommendations   Anticipated Disposition:  Home, no needs identified  Transportation Needs:  TBD     Additional comments   Pt is 53 y/o male admitted to [facility] on 1/31/18 as transfer from [facility] for ERCP procedure today. Pt has hx of alcohol abuse (1 bottle of outh wash, and half a gallon of vodka daily since 2015), recurring alcoholic pancreatitis, and cholelithiasis.     PIETER met with Pt at bedside today and obtained the above information. Pt was pleasant, cooperative and open to conversation with SW. Pt declining to pursue CD treatment at this time, but said he will think about it.    Pt plans to return home to his apartment when medically stable. Reports he has been getting along well and does not need services.    He did report needing clothing. SW supplied him with list of [employer]othing [employer]os[employer] in his preferred area so that he can pursue this once back home in the community.    SW remains available if further needs arise during this hospitalization.    RIMA Scott, BHARGAVI   Surgical Oncology Unit   [phone]  Pager: [phone]

## 2018-02-02 NOTE — PROGRESS NOTES
INTERNAL MEDICINE PROGRESS NOTE    Date of Service: February 2, 2018            Assessment and Plan:     Mario Lamar II is a 52 year old male with history significant for EtOH abuse with recurrent alcoholic pancreatitis and history of alcohol withdrawal who was admitted 1/31/2018 with elevated AST/ALT, bilirubin, lipase and concern for choledocholithiasis.     # EtOH hepatitis  Maddrey's discriminatory function 25. Patient does not require steroids at this time.   - GI consulted, appreciate recs  - continue to monitor     # Abdominal pain  Patient presented with abdominal pain and CBD dilatation on US initially concerning for choledocholithiasis. MRCP without intrahepatic or extrahepatic dilatation. Will continue to monitor. May consider PUD as a potential etiology.  - start empiric PPI         # EtOH abuse  # EtOH withdrawal  Last drink was 1/29. Patient is at high risk for EtOH withdrawal. Will monitor closely and apply MSSA protocol as needed. Patient is agreeable with meeting with chemical dependency.  - MSSA protocol   - thiamine and folate    - chemical dependency consulted, appreciate recs  - social work consulted, appreciate recs    # Chalazion  - warm compresses QID      # Electrolyte imbalance  2/2 EtOH abuse and chronic malnutrition. Patient presented with low Mg, K, and Phos.   - replace electrolytes as needed  - phos protocol in place      Chronic medical problems:   # Depression  Apparently was told to take multiple medications including escitalopram, hydroxyzine, mirtazapine but he doesn't take any of these. He states he is not motivated. Might warrant SW consult prior to discharge. Will hold for now      FEN: NPO, D5 1/2NS  PPx: low risk  Code: Full Code  Dispo: Pending further workup and clinical improvement     Patient staffed with Dr. Mayelin Solomon, who agrees with above plans.    Yaakov Basurto MD  Internal Medicine,  "PGY-2  [phone]  -----------------------------------------------------------------------------------------------------------------------------------------------    Interval History    No acute events overnight. Patient complained dull abdominal pain this AM. No other complaints. Denies Chest pain/ SOB/ cough. Denies any Nausea &Vomiting. No abdominal pain. Denies urinary problems . Denies fever/chills/rigor    Review of Systems:   A 4 point review of systems was negative except as noted above.    OBJECTIVE:  VS: Vital signs:  Temp: 97.3  F (36.3  C) Temp src: Oral BP: 129/84 Pulse: 83 Heart Rate: 85 Resp: 16 SpO2: 97 % O2 Device: None (Room air) Oxygen Delivery: 2 LPM Height: 174 cm (5' 8.5\") Weight: 68.1 kg (150 lb 3.2 oz)  Estimated body mass index is 22.51 kg/(m^2) as calculated from the following:    Height as of this encounter: 1.74 m (5' 8.5\").    Weight as of this encounter: 68.1 kg (150 lb 3.2 oz).      Intake/Output Summary (Last 24 hours) at 02/02/18 1145  Last data filed at 02/02/18 0930   Gross per 24 hour   Intake              240 ml   Output             3275 ml   Net            -3035 ml     GENERAL APPEARANCE: alert and no distress  HEENT: L medial to eye with dried blood and erythema without drainage  Pulmonary: CTAB  CV: regular rhythm, normal rate, no murmur, no rub  GI: soft, mild tenderness to epigastrium  Ext: no edema  NEURO: mentation intact and speech normal, equally move    Labs:   All labs reviewed by me  Electrolytes/Renal - Recent Labs   Lab Test  02/02/18   0721 02/01/18   1445  02/01/18 0752 01/31/18   1018   NA  130*   --   131*  136   POTASSIUM  3.6  3.9  3.2*  3.0*   CHLORIDE  96   --   94  96   CO2  24   --   21  22   BUN  3*   --   3*  9   CR  0.40*   --   0.39*  0.46*   GLC  111*   --   54*  66*   JOHN  9.2   --   8.0*  8.9   MAG  2.1  2.2  1.4*  1.8   PHOS  1.4*  2.2*  1.9*   --      CBC -   Recent Labs   Lab Test  02/02/18 0721 02/01/18   0752 01/31/18   1018   WBC  " 5.9  5.2  6.8   HGB  13.8  11.9*  14.4   PLT  40*  43*  62*     LFTs Recent Labs   Lab Test  02/02/18   0721  02/01/18   0752  01/31/18   1018   ALKPHOS  306*  251*  261*   BILITOTAL  11.4*  7.2*  3.9*   ALT  127*  121*  132*   AST  272*  323*  345*   PROTTOTAL  8.1  7.3  9.4*   ALBUMIN  3.6  3.1*  4.2       Current Medications:    clotrimazole   Topical BID     multivitamin, therapeutic  1 tablet Oral Daily     potassium chloride  20 mEq Oral Once     folic acid  1 mg Oral Daily     sodium chloride (PF)  3 mL Intracatheter Q8H

## 2018-02-02 NOTE — PROGRESS NOTES
" 02/02/18 0824   Quick Adds   Type of Visit Initial Occupational Therapy Evaluation   Living Environment   Lives With alone   Living Arrangements apartment   Home Accessibility tub/shower is not walk in   Number of Stairs to Enter Home (does not have to access)   Living Environment Comment Pt has elevator access to apartment.  provides monthly compensation for bus and laundry   Self-Care   Dominant Hand left   Usual Activity Tolerance good   Current Activity Tolerance moderate   Regular Exercise yes   Activity/Exercise Type walking;other (see comments)  (50 situps)   Exercise Amount/Frequency daily   Equipment Currently Used at Home none   Activity/Exercise/Self-Care Comment Pt reports walking frequently. Reports having daily routine including cleaning apartment daily 2/2 guidelines provided by .    Functional Level Prior   Ambulation 0-->independent   Transferring 0-->independent   Toileting 0-->independent   Bathing 0-->independent   Dressing 0-->independent   Eating 0-->independent   Communication 0-->understands/communicates without difficulty   Swallowing 0-->swallows foods/liquids without difficulty   Cognition 0 - no cognition issues reported   Fall history within last six months yes   Number of times patient has fallen within last six months 1  (Per RN report-4 falls)   Which of the above functional risks had a recent onset or change? none   Prior Functional Level Comment Pt reports independence with ADLs;  provides self-care items but patient reports independence in going to [employer] down the street for groceries. Independent with cleaning, meal prep, and laundry. Pt reports typically independent with med management but has not had in last month due to sister throwing them away.    General Information   Onset of Illness/Injury or Date of Surgery - Date 01/31/18   Referring Physician Yaakov Basurto MD   Patient/Family Goals Statement \"get out of hospital\"   Additional " "Occupational Profile Info/Pertinent History of Current Problem \"52 year old male with a history of alcohol abuse with recurrent alcoholic pancreatitis and history of alcohol withdrawal, TBI, MDD, GERD who was admitted 1/31/2018 with elevated AST/ALT, bilirubin, lipase and concern for choledocholithiasis. We have reviewed his MRCP, there are no signs of a filling defect or other cause of his dilated CBD - it is likely that he has passed a stone. At this time, his ongoing pain is likely related to his acute alcoholic hepatitis. \"   Precautions/Limitations fall precautions   Weight-Bearing Status - LUE full weight-bearing   Weight-Bearing Status - RUE full weight-bearing   Weight-Bearing Status - LLE full weight-bearing   Weight-Bearing Status - RLE full weight-bearing   General Info Comments Activity: up with assist   Cognitive Status Examination   Orientation orientation to person, place and time   Level of Consciousness alert   Able to Follow Commands mild impairment   Personal Safety (Cognitive) decreased insight to deficits   Attention No deficits were identified   Cognitive Comment No major cognitive concerns noted; pt oriented to situation and appropriate with conversation. Able to demonstrating appropriate planning with regards to responsiblities and discharge.   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    Sensory Examination   Sensory Quick Adds No deficits were identified   Integumentary/Edema   Integumentary/Edema no deficits were identifed   Posture   Posture forward head position;protracted shoulders   Range of Motion (ROM)   ROM Comment BUE ROM WFL   Strength   Strength Comments MMT: BUE grossly 5/5; pt reports feeling weak in the legs   Hand Strength   Hand Strength Comments Strong bilateral gross grasp   Coordination   Coordination Comments Pt presenting with tremors impacting motor " "coordination   Mobility   Bed Mobility Bed mobility skill: Sit to supine;Bed mobility skill: Supine to sit   Bed Mobility Skill: Sit to Supine   Level of [place]: Sit/Supine independent   Bed Mobility Skill: Supine to Sit   Level of [place]: Supine/Sit independent   Transfer Skill: Sit to Stand   Level of [place]: Sit/Stand contact guard   Balance   Balance Quick Add Standing balance: dynamic   Standing Balance: Dynamic poor balance   Balance Comments Requiring min A for recovery of balance x 2   Lower Body Dressing   Level of [place]: Dress Lower Body stand-by assist   Grooming   Level of [place]: Grooming stand-by assist   Instrumental Activities of Daily Living (IADL)   Previous Responsibilities meal prep;laundry;housekeeping;shopping;medication management;finances   Activities of Daily Living Analysis   Impairments Contributing to Impaired Activities of Daily Living cognition impaired;balance impaired;coordination impaired;pain   General Therapy Interventions   Planned Therapy Interventions ADL retraining;IADL retraining;cognition;home program guidelines;progressive activity/exercise;risk factor education   Clinical Impression   Criteria for Skilled Therapeutic Interventions Met yes, treatment indicated   OT Diagnosis decreased safety with I/ADLs   Influenced by the following impairments tremors, balance and coordination, cognition,    Assessment of Occupational Performance 1-3 Performance Deficits   Identified Performance Deficits home management, functional mobility, bathing   Clinical Decision Making (Complexity) Low complexity   Therapy Frequency other (see comments)  (6x/week)   Predicted Duration of Therapy Intervention (days/wks) 10 days   Anticipated Discharge Disposition Other (see comments)  (Home with home care)   Risks and Benefits of Treatment have been explained. Yes   Patient, Family & other staff in agreement with plan of care Yes   [facility] AM-PAC TM \"6 Clicks\" " "  2016, Trustees of [facility], under license to [employer].  All rights reserved.   6 Clicks Short Forms Daily Activity Inpatient Short Form   [facility] AM-PAC  \"6 Clicks\" Daily Activity Inpatient Short Form   1. Putting on and taking off regular lower body clothing? 3 - A Little   2. Bathing (including washing, rinsing, drying)? 2 - A Lot   3. Toileting, which includes using toilet, bedpan or urinal? 3 - A Little   4. Putting on and taking off regular upper body clothing? 4 - None   5. Taking care of personal grooming such as brushing teeth? 4 - None   6. Eating meals? 4 - None   Daily Activity Raw Score (Score out of 24.Lower scores equate to lower levels of function) 20   Total Evaluation Time   Total Evaluation Time (Minutes) 12     "

## 2018-02-02 NOTE — PLAN OF CARE
Problem: Patient Care Overview  Goal: Plan of Care/Patient Progress Review  Outcome: No Change  HD#2, work-up for elevated liver enzymes, pain r/t alcoholic hepatitis. This morning disoriented to day, intermittent confusion/forgetfulness. Bed alarm on. AVSS, on room air. Right abdominal pain, managed well with PRN IV Dilaudid x1. 2g solidum diet, denies nausea, fair appetite. Worked with OT this morning-- gait belt for safety due to unsteadiness and tremors. MSSA of 8 at 0800 (tremors & disoriented), 2mg Ativan given. PIV infusing Phos replacement, Phos 1.6, recheck scheduled for 2215. Plan- per GI, MRCP does not show stones, no need for ERCP. Hepatology team to see Pt today. Continue with POC.     Addendum: MSSA of 5 at 1130, no intervention. MSSA of 9 at 1600 (tremors & restlessness), 2mg Ativan given.

## 2018-02-02 NOTE — PLAN OF CARE
Problem: Patient Care Overview  Goal: Plan of Care/Patient Progress Review  PT 7C: Holding - PT orders acknowledged and appreciated. Following discussion with OT, pt with some balance impairments but likely related to withdrawal symptoms. Pt also limited by cognitive deficits at this time. Will continue to monitor for IP PT needs and initiate/defer as appropriate. Thank you for your referral. Please continue to reference OT for discharge recommendations at this time.

## 2018-02-02 NOTE — PLAN OF CARE
Problem: Patient Care Overview  Goal: Plan of Care/Patient Progress Review  OT 7C  Discharge Planner OT   Patient plan for discharge: Home  Current status: Pt independent with bed mobility; good static sitting and standing balance however, poor dynamic standing balance noted. CGA for STS from EOB and demonstrating LOB x 2 requiring min A for recovery. Pt CGA for all mobility within room to sink for oral and facial hygiene and OOR ~725 feet functional mobility. Pt unsteady on feet and with notable tremors in BUEs and BLEs. Pt also with notable diaphoresis and reporting feeling cold. Pt with appropriate use of materials and sequencing with ADL tasks. Also able to doff/don socks SBA while seated.   Barriers to return to prior living situation: substance abuse history, lives alone, current withdrawal symptoms  Recommendations for discharge: Anticipate patient will be safe to discharge home   Rationale for recommendations: Anticipate improvement with gait and balance with reduction of withdrawal symptoms as limited by tremors. Pt reports having strong support for community and IADL management from 4 different . Pt may benefit from [facility] for setup and management of meds as patient reports not taking for last month 2/2 not having any left.       Entered by: Estela Cruz 02/02/2018 10:44 AM

## 2018-02-02 NOTE — PROGRESS NOTES
"GASTROENTEROLOGY PROGRESS NOTE  Date of Service: 2/2/2018    ASSESSMENT: 52 year old male with a history of alcohol abuse with recurrent alcoholic pancreatitis and history of alcohol withdrawal, TBI, MDD, GERD who was admitted 1/31/2018 with elevated AST/ALT, bilirubin, lipase and concern for choledocholithiasis. We have reviewed his MRCP, there are no signs of a filling defect or other cause of his dilated CBD - it is likely that he has passed a stone. At this time, his ongoing pain is likely related to his acute alcoholic hepatitis.     RECOMMENDATIONS:    - Given MRCP negative for choledocholithiasis, at this time GI Panc/Bili team will sign off  - Pt's acute alcoholic hepatitis is likely a cause of his ongoing pain - hepatology team aware and plans to see patient today  - If any further questions or concerns, please feel free to contact us.     The patient was discussed and plan agreed upon with GI staff.    Matt Mello MD  9:50 AM 02/02/2018  _______________________________________________________________  S: Doing well overnight, minimal pain medication requirements    O:   Blood pressure 127/84, pulse 85, temperature 99.1  F (37.3  C), resp. rate 16, height 1.74 m (5' 8.5\"), weight 68.1 kg (150 lb 3.2 oz), SpO2 93 %.  Gen: Awake, alert, responding to questions  HEENT: Scleral icterus noted  CV: WWP  Pulm: Nonlabored  Abd: Soft, mildly tender epigastrium and RUQ without rebound or guarding    LABS:  BMP  Recent Labs  Lab 02/02/18  0721 02/01/18  1445 02/01/18  0752 01/31/18  1018   *  --  131* 136   POTASSIUM 3.6 3.9 3.2* 3.0*   CHLORIDE 96  --  94 96   JOHN 9.2  --  8.0* 8.9   CO2 24  --  21 22   BUN 3*  --  3* 9   CR 0.40*  --  0.39* 0.46*   *  --  54* 66*     CBC  Recent Labs  Lab 02/02/18  0721 02/01/18  0752 01/31/18  1018   WBC 5.9 5.2 6.8   RBC 4.33* 3.82* 4.48   HGB 13.8 11.9* 14.4   HCT 40.3 35.7* 41.6   MCV 93 94 93   MCH 31.9 31.2 32.1   MCHC 34.2 33.3 34.6   RDW 15.6* " 15.8* 15.6*   PLT 40* 43* 62*     INR  Recent Labs  Lab 02/02/18  0721 02/01/18  0752 01/31/18  1018   INR 1.07 1.10 1.04     LFTs  Recent Labs  Lab 02/02/18  0721 02/01/18  0752 01/31/18  1018   ALKPHOS 306* 251* 261*   * 323* 345*   * 121* 132*   BILITOTAL 11.4* 7.2* 3.9*   PROTTOTAL 8.1 7.3 9.4*   ALBUMIN 3.6 3.1* 4.2      PANC  Recent Labs  Lab 01/31/18  1018   LIPASE 631*

## 2018-02-03 LAB
ALBUMIN SERPL-MCNC: 3.3 G/DL (ref 3.4–5)
ALP SERPL-CCNC: 278 U/L (ref 40–150)
ALT SERPL W P-5'-P-CCNC: 115 U/L (ref 0–70)
ANION GAP SERPL CALCULATED.3IONS-SCNC: 11 MMOL/L (ref 3–14)
AST SERPL W P-5'-P-CCNC: 182 U/L (ref 0–45)
BILIRUB DIRECT SERPL-MCNC: 9.6 MG/DL (ref 0–0.2)
BILIRUB SERPL-MCNC: 12.1 MG/DL (ref 0.2–1.3)
BUN SERPL-MCNC: 9 MG/DL (ref 7–30)
CALCIUM SERPL-MCNC: 8.9 MG/DL (ref 8.5–10.1)
CHLORIDE SERPL-SCNC: 98 MMOL/L (ref 94–109)
CO2 SERPL-SCNC: 23 MMOL/L (ref 20–32)
CREAT SERPL-MCNC: 0.37 MG/DL (ref 0.66–1.25)
GFR SERPL CREATININE-BSD FRML MDRD: >90 ML/MIN/1.7M2
GLUCOSE SERPL-MCNC: 113 MG/DL (ref 70–99)
MAGNESIUM SERPL-MCNC: 1.9 MG/DL (ref 1.6–2.3)
PHOSPHATE SERPL-MCNC: 2.6 MG/DL (ref 2.5–4.5)
POTASSIUM SERPL-SCNC: 3.4 MMOL/L (ref 3.4–5.3)
PROT SERPL-MCNC: 8 G/DL (ref 6.8–8.8)
SODIUM SERPL-SCNC: 132 MMOL/L (ref 133–144)

## 2018-02-03 PROCEDURE — 25000132 ZZH RX MED GY IP 250 OP 250 PS 637: Performed by: STUDENT IN AN ORGANIZED HEALTH CARE EDUCATION/TRAINING PROGRAM

## 2018-02-03 PROCEDURE — 12000001 ZZH R&B MED SURG/OB UMMC

## 2018-02-03 PROCEDURE — 36415 COLL VENOUS BLD VENIPUNCTURE: CPT | Performed by: STUDENT IN AN ORGANIZED HEALTH CARE EDUCATION/TRAINING PROGRAM

## 2018-02-03 PROCEDURE — 25000128 H RX IP 250 OP 636: Performed by: INTERNAL MEDICINE

## 2018-02-03 PROCEDURE — 25000132 ZZH RX MED GY IP 250 OP 250 PS 637: Performed by: INTERNAL MEDICINE

## 2018-02-03 PROCEDURE — 25000125 ZZHC RX 250: Performed by: INTERNAL MEDICINE

## 2018-02-03 PROCEDURE — 84100 ASSAY OF PHOSPHORUS: CPT | Performed by: STUDENT IN AN ORGANIZED HEALTH CARE EDUCATION/TRAINING PROGRAM

## 2018-02-03 PROCEDURE — 80076 HEPATIC FUNCTION PANEL: CPT | Performed by: STUDENT IN AN ORGANIZED HEALTH CARE EDUCATION/TRAINING PROGRAM

## 2018-02-03 PROCEDURE — 99233 SBSQ HOSP IP/OBS HIGH 50: CPT | Mod: GC | Performed by: INTERNAL MEDICINE

## 2018-02-03 PROCEDURE — 25000128 H RX IP 250 OP 636: Performed by: STUDENT IN AN ORGANIZED HEALTH CARE EDUCATION/TRAINING PROGRAM

## 2018-02-03 PROCEDURE — 83735 ASSAY OF MAGNESIUM: CPT | Performed by: STUDENT IN AN ORGANIZED HEALTH CARE EDUCATION/TRAINING PROGRAM

## 2018-02-03 PROCEDURE — 80048 BASIC METABOLIC PNL TOTAL CA: CPT | Performed by: STUDENT IN AN ORGANIZED HEALTH CARE EDUCATION/TRAINING PROGRAM

## 2018-02-03 RX ORDER — CARBOXYMETHYLCELLULOSE SODIUM 5 MG/ML
1 SOLUTION/ DROPS OPHTHALMIC 3 TIMES DAILY PRN
Status: DISCONTINUED | OUTPATIENT
Start: 2018-02-03 | End: 2018-02-04 | Stop reason: HOSPADM

## 2018-02-03 RX ORDER — POTASSIUM CHLORIDE 750 MG/1
20 TABLET, EXTENDED RELEASE ORAL ONCE
Status: COMPLETED | OUTPATIENT
Start: 2018-02-03 | End: 2018-02-03

## 2018-02-03 RX ORDER — LANOLIN ALCOHOL/MO/W.PET/CERES
100 CREAM (GRAM) TOPICAL DAILY
Status: DISCONTINUED | OUTPATIENT
Start: 2018-02-03 | End: 2018-02-04 | Stop reason: HOSPADM

## 2018-02-03 RX ADMIN — POLYMYXIN B SULFATE AND TRIMETHOPRIM 1 DROP: 1; 10000 SOLUTION OPHTHALMIC at 07:06

## 2018-02-03 RX ADMIN — CLOTRIMAZOLE: 10 CREAM TOPICAL at 08:38

## 2018-02-03 RX ADMIN — Medication 0.2 MG: at 20:35

## 2018-02-03 RX ADMIN — PANTOPRAZOLE SODIUM 40 MG: 40 TABLET, DELAYED RELEASE ORAL at 08:37

## 2018-02-03 RX ADMIN — THERA TABS 1 TABLET: TAB at 08:37

## 2018-02-03 RX ADMIN — POLYMYXIN B SULFATE AND TRIMETHOPRIM 1 DROP: 1; 10000 SOLUTION OPHTHALMIC at 10:18

## 2018-02-03 RX ADMIN — POLYMYXIN B SULFATE AND TRIMETHOPRIM 1 DROP: 1; 10000 SOLUTION OPHTHALMIC at 04:29

## 2018-02-03 RX ADMIN — FOLIC ACID 1 MG: 1 TABLET ORAL at 08:37

## 2018-02-03 RX ADMIN — POLYMYXIN B SULFATE AND TRIMETHOPRIM 1 DROP: 1; 10000 SOLUTION OPHTHALMIC at 00:51

## 2018-02-03 RX ADMIN — Medication 0.2 MG: at 00:50

## 2018-02-03 RX ADMIN — Medication 100 MG: at 08:37

## 2018-02-03 RX ADMIN — CLOTRIMAZOLE: 10 CREAM TOPICAL at 20:24

## 2018-02-03 RX ADMIN — POTASSIUM PHOSPHATE, MONOBASIC AND POTASSIUM PHOSPHATE, DIBASIC 10 MMOL: 224; 236 INJECTION, SOLUTION INTRAVENOUS at 14:18

## 2018-02-03 RX ADMIN — LORAZEPAM 2 MG: 1 TABLET ORAL at 11:54

## 2018-02-03 RX ADMIN — Medication 0.2 MG: at 16:24

## 2018-02-03 RX ADMIN — POTASSIUM CHLORIDE 20 MEQ: 750 TABLET, EXTENDED RELEASE ORAL at 11:47

## 2018-02-03 ASSESSMENT — PAIN DESCRIPTION - DESCRIPTORS
DESCRIPTORS: ACHING
DESCRIPTORS: ACHING;DISCOMFORT

## 2018-02-03 NOTE — PLAN OF CARE
Problem: Patient Care Overview  Goal: Plan of Care/Patient Progress Review  Outcome: No Change  AVSS. MSSA score less than 8, not needing Ativan. Bed alarm on due to forgetfulness and generalized weakness. On a 2 gram sodium diet. Denies nausea.  IV Dilaudid given x1 for abdominal pain. Voiding spontaneously. Had a BM. Eye gtts given. Potassium recheck WNL. Continue with plan of care.

## 2018-02-03 NOTE — PLAN OF CARE
Problem: Patient Care Overview  Goal: Plan of Care/Patient Progress Review  Outcome: Improving  HD#3. Alert and oriented. AVSS, on room air. Pt using call light appropriately. IV Dilaudid x1 for pain. 2g sodium diet, tolerating well, denies nausea. MSSA at 0800, 5. MSSA at 1200 10 (tremors and restlessness) -- 2mg Ativan given. Voiding spont, not saving. Passing flatus, BM x1. PIV infusing phos replacement, Phos of 2.6, recheck in AM. SBA. Continue with POC.

## 2018-02-03 NOTE — PROGRESS NOTES
INTERNAL MEDICINE PROGRESS NOTE    Date of Service: February 3, 2018            Assessment and Plan:     Mario Lamar II is a 52 year old male with history significant for EtOH abuse with recurrent alcoholic pancreatitis and history of alcohol withdrawal who was admitted 1/31/2018 with elevated AST/ALT, bilirubin, lipase and concern for choledocholithiasis.     Plans today  - continue to trend CMP     # EtOH hepatitis  Maddrey's discriminatory function 25. Patient does not require steroids at this time. GI following patient and indicated that at this time he is not a liver transplant candidate pending psych and chemical dependency evaluation.   - GI consulted, appreciate recs  - continue to monitor      # Abdominal pain  Patient presented with abdominal pain and CBD dilatation on US initially concerning for choledocholithiasis. MRCP without intrahepatic or extrahepatic dilatation. Will continue to monitor. May consider PUD as a potential etiology.  - start empiric PPI          # EtOH abuse  # EtOH withdrawal  Last drink was 1/29. Patient is at high risk for EtOH withdrawal. Will monitor closely and apply MSSA protocol as needed. Patient met with chemical dependency but is not interested in treatment at this time. Ativan last used yesterday afternoon for MSSA score of 9.  - MSSA protocol   - thiamine and folate       # Chalazion  - warm compresses QID      # Electrolyte imbalance  2/2 EtOH abuse and chronic malnutrition. Patient presented with low Mg, K, and Phos.   - replace electrolytes as needed  - phos protocol in place      Chronic medical problems:   # Depression  Apparently was told to take multiple medications including escitalopram, hydroxyzine, mirtazapine but he doesn't take any of these. He states he is not motivated. Might warrant SW consult prior to discharge. Will hold for now      FEN: NPO, D5 1/2NS  PPx: low risk  Code: Full Code  Dispo: Pending further workup and clinical  "improvement    Patient staffed with Dr. Mayelin Solomon, who agrees with above plans.    Yaakov Basurto MD  Internal Medicine, PGY-2  [phone]  -----------------------------------------------------------------------------------------------------------------------------------------------    Interval History    No acute events overnight. Patient denied any pain at this time. Worried about his house. Denies Chest pain/ SOB/ cough. Denies any Nausea &Vomiting. No abdominal pain. Denies urinary problems . Denies fever/chills/rigor    Review of Systems:   A 4 point review of systems was negative except as noted above.    OBJECTIVE:  VS: Vital signs:  Temp: 97.6  F (36.4  C) Temp src: Oral BP: 126/84 Pulse: 91   Resp: 16 SpO2: 94 % O2 Device: None (Room air) Oxygen Delivery: 2 LPM Height: 174 cm (5' 8.5\") Weight: 64.8 kg (142 lb 12.8 oz)  Estimated body mass index is 21.4 kg/(m^2) as calculated from the following:    Height as of this encounter: 1.74 m (5' 8.5\").    Weight as of this encounter: 64.8 kg (142 lb 12.8 oz).      Intake/Output Summary (Last 24 hours) at 02/03/18 1347  Last data filed at 02/02/18 1947   Gross per 24 hour   Intake              240 ml   Output              375 ml   Net             -135 ml     GENERAL APPEARANCE: alert and no distress  HEENT: L medial to eye with dried blood and erythema without drainage  Pulmonary: CTAB  CV: regular rhythm, normal rate, no murmur, no rub  GI: soft, mild tenderness to epigastrium  Ext: no edema  NEURO: mentation intact and speech normal, equally move    Labs:   All labs reviewed by me  Electrolytes/Renal - Recent Labs   Lab Test  02/03/18   0729  02/02/18   2212  02/02/18   0721  02/01/18   1445  02/01/18   0752   NA  132*   --   130*   --   131*   POTASSIUM  3.4   --   3.6  3.9  3.2*   CHLORIDE  98   --   96   --   94   CO2  23   --   24   --   21   BUN  9   --   3*   --   3*   CR  0.37*   --   0.40*   --   0.39*   GLC  113*   --   111*   --   54*   JOHN  8.9   --   " 9.2   --   8.0*   MAG  1.9   --   2.1  2.2  1.4*   PHOS  2.6  3.1  1.4*  2.2*  1.9*     CBC -   Recent Labs   Lab Test  02/02/18   0721  02/01/18   0752  01/31/18   1018   WBC  5.9  5.2  6.8   HGB  13.8  11.9*  14.4   PLT  40*  43*  62*     LFTs Recent Labs   Lab Test  02/03/18   0729  02/02/18   0721  02/01/18   0752   ALKPHOS  278*  306*  251*   BILITOTAL  12.1*  11.4*  7.2*   ALT  115*  127*  121*   AST  182*  272*  323*   PROTTOTAL  8.0  8.1  7.3   ALBUMIN  3.3*  3.6  3.1*       Current Medications:    thiamine  100 mg Oral Daily     pantoprazole  40 mg Oral QAM     trimethoprim-polymyxin b  1 drop Both Eyes Q3H     clotrimazole   Topical BID     multivitamin, therapeutic  1 tablet Oral Daily     potassium chloride  20 mEq Oral Once     folic acid  1 mg Oral Daily     sodium chloride (PF)  3 mL Intracatheter Q8H

## 2018-02-04 VITALS
WEIGHT: 144.2 LBS | HEIGHT: 69 IN | RESPIRATION RATE: 16 BRPM | BODY MASS INDEX: 21.36 KG/M2 | TEMPERATURE: 97.5 F | DIASTOLIC BLOOD PRESSURE: 69 MMHG | HEART RATE: 78 BPM | OXYGEN SATURATION: 93 % | SYSTOLIC BLOOD PRESSURE: 108 MMHG

## 2018-02-04 LAB
ALBUMIN SERPL-MCNC: 3.1 G/DL (ref 3.4–5)
ALP SERPL-CCNC: 266 U/L (ref 40–150)
ALT SERPL W P-5'-P-CCNC: 104 U/L (ref 0–70)
ANION GAP SERPL CALCULATED.3IONS-SCNC: 11 MMOL/L (ref 3–14)
AST SERPL W P-5'-P-CCNC: 163 U/L (ref 0–45)
BILIRUB SERPL-MCNC: 11.2 MG/DL (ref 0.2–1.3)
BUN SERPL-MCNC: 11 MG/DL (ref 7–30)
CALCIUM SERPL-MCNC: 8.8 MG/DL (ref 8.5–10.1)
CHLORIDE SERPL-SCNC: 99 MMOL/L (ref 94–109)
CO2 SERPL-SCNC: 23 MMOL/L (ref 20–32)
CREAT SERPL-MCNC: 0.4 MG/DL (ref 0.66–1.25)
GFR SERPL CREATININE-BSD FRML MDRD: >90 ML/MIN/1.7M2
GLUCOSE SERPL-MCNC: 101 MG/DL (ref 70–99)
INR PPP: 1.11 (ref 0.86–1.14)
POTASSIUM SERPL-SCNC: 3.5 MMOL/L (ref 3.4–5.3)
PROT SERPL-MCNC: 7.8 G/DL (ref 6.8–8.8)
SODIUM SERPL-SCNC: 133 MMOL/L (ref 133–144)

## 2018-02-04 PROCEDURE — 85610 PROTHROMBIN TIME: CPT | Performed by: STUDENT IN AN ORGANIZED HEALTH CARE EDUCATION/TRAINING PROGRAM

## 2018-02-04 PROCEDURE — 25000132 ZZH RX MED GY IP 250 OP 250 PS 637: Performed by: STUDENT IN AN ORGANIZED HEALTH CARE EDUCATION/TRAINING PROGRAM

## 2018-02-04 PROCEDURE — 99238 HOSP IP/OBS DSCHRG MGMT 30/<: CPT | Performed by: INTERNAL MEDICINE

## 2018-02-04 PROCEDURE — 25000132 ZZH RX MED GY IP 250 OP 250 PS 637: Performed by: INTERNAL MEDICINE

## 2018-02-04 PROCEDURE — 80053 COMPREHEN METABOLIC PANEL: CPT | Performed by: STUDENT IN AN ORGANIZED HEALTH CARE EDUCATION/TRAINING PROGRAM

## 2018-02-04 RX ORDER — FOLIC ACID 1 MG/1
1 TABLET ORAL DAILY
Qty: 30 TABLET | Refills: 0 | Status: SHIPPED | OUTPATIENT
Start: 2018-02-05 | End: 2018-04-09

## 2018-02-04 RX ORDER — ACAMPROSATE CALCIUM 333 MG/1
666 TABLET, DELAYED RELEASE ORAL 3 TIMES DAILY
Qty: 90 TABLET | Refills: 0 | Status: SHIPPED | OUTPATIENT
Start: 2018-02-04 | End: 2018-04-09

## 2018-02-04 RX ORDER — PANTOPRAZOLE SODIUM 40 MG/1
40 TABLET, DELAYED RELEASE ORAL EVERY MORNING
Qty: 30 TABLET | Refills: 0 | Status: SHIPPED | OUTPATIENT
Start: 2018-02-05 | End: 2018-04-09

## 2018-02-04 RX ORDER — LANOLIN ALCOHOL/MO/W.PET/CERES
100 CREAM (GRAM) TOPICAL DAILY
Qty: 30 TABLET | Refills: 0 | Status: SHIPPED | OUTPATIENT
Start: 2018-02-05 | End: 2018-04-09

## 2018-02-04 RX ADMIN — LORAZEPAM 2 MG: 1 TABLET ORAL at 08:02

## 2018-02-04 RX ADMIN — CLOTRIMAZOLE: 10 CREAM TOPICAL at 07:42

## 2018-02-04 RX ADMIN — PANTOPRAZOLE SODIUM 40 MG: 40 TABLET, DELAYED RELEASE ORAL at 07:43

## 2018-02-04 RX ADMIN — THERA TABS 1 TABLET: TAB at 07:43

## 2018-02-04 RX ADMIN — CARBOXYMETHYLCELLULOSE SODIUM 1 DROP: 5 SOLUTION/ DROPS OPHTHALMIC at 08:02

## 2018-02-04 RX ADMIN — FOLIC ACID 1 MG: 1 TABLET ORAL at 07:43

## 2018-02-04 RX ADMIN — Medication 100 MG: at 07:43

## 2018-02-04 NOTE — PROGRESS NOTES
Social Work Services Progress Note     Hospital Day: 5.  Collaborated with:  Chart Review, Patient, Med Team, 7C Ron     Data:  Pt is 53 y/o male admitted to [facility] on 1/31/18 as transfer from [place] ED for ERCP procedure. Pt has hx of alcohol abuse (1 bottle of mouth wash, and half a gallon of vodka daily since 2015), recurring alcoholic pancreatitis, and cholelithiasis. Per Med Team, pt is medically stable for discharge today and needs a bus pass.      Intervention: Per Med Team, pt is medically stable for discharge today and needs a bus pass. Today is the [place] Bowl in [place] and bus routes have been altered to accommodate visitors. SW met with pt and explained that a cab ride would be in pt's best interest due to today's event downtown [place]/cold weather. Pt shared that he has recently moved to [address] [place] [address]. Pt is agreeable to cab ride home. PIETER updated address in [employer]. PIETER arranged for [employer] (P: [phone]) to  pt at hospital main entrance ([address]) at 1300; Job #[id number]. Ron updated; RN will update pt.      Plan:    Discharge Disposition:  Pt will discharge home today via taxi at 1300.     Follow Up:  No SW follow-up required as pt is discharging home today.     RIMA Anthony, [facility]  Weekend   Weekend Pager: [phone]

## 2018-02-04 NOTE — PLAN OF CARE
Problem: Patient Care Overview  Goal: Plan of Care/Patient Progress Review  Outcome: No Change  AVSS. A&Ox4. MSSA 4-5 overnight. Dilaudid given once overnight for back pain. Denies nausea. Tolerating 2G sodium diet. PIV saline locked. Passing gas, having BM's. Voids spont, not saving.  Up to bathroom independently, calls appropriately if need help. Plan pending further workup on liver function and clinical improvement. Pt would like bedside report at shift change.

## 2018-02-04 NOTE — DISCHARGE SUMMARY
"  [facility] - Internal Medicine Discharge Summary   Date of Service: 2/4/2018    Mario Lamar II MRN# [id number]   YOB: 1965 Age: 52 year old     Date of Admission:  1/31/2018  Date of Discharge:  2/4/2018 12:44 PM  Admitting Physician:  Stew Barcenas MD  Discharge Physician:  Mayelin Solomon DO  Discharging Service:  Internal Medicine, [place] [address]     Primary Provider: Elle Aguilar         Reason for Admission:   Hepatitis  Alcohol withdrawal          Discharge Diagnosis:   Hepatitis  Alcohol withdrawal          Consultations:   Gastroenterology/Hepatology  Psychiatry         Hospital Course by Problem:        Alcohol hepatitis - Initial concern for choledocholithiasis based on abd ultrasound.  MRCP done and negative for any intra or extra hepatic biliary duct dilatation.  Tylenol level negative at admission. Significant alcohol use and abuse.  Maddrey score was 25.  Hepatology evaluated the patient and did not recommend steroids.  MELD score is 22.  Not a transplant candidate given acute alcohol abuse.  Patient was educated extensively on abstinence.    Abd pain secondary to likely gastritis - started on PPI with improvement in symptoms.  Discharged with a PPI prescription.    Alcohol abuse and withdrawal - treated with MSSA protocol with ativan.  Had 24 hours of not requiring ativan prior to discharge.  Seen by psychiatry and also social work regarding chemical dependency.  Patient was also given list of resources for chemical dependency but declined as he wants to follow up with previous counselor.  He did not that campral has helped him in the past thus this was prescribed at discharge.   He was also discharged with thiamine and folate.          Physical Exam on day of Discharge:  Blood pressure 108/69, pulse 78, temperature 97.5  F (36.4  C), temperature source Oral, resp. rate 16, height 1.74 m (5' 8.5\"), weight 65.4 kg (144 lb 3.2 oz), SpO2 93 %.on room air  General: Sitting " in bed, not in any distress  HEENT: + Scleral icterus, left eye chalazion   Respiratory: CTAB  Heart/CV: RRR, No m/g/r  Abdomen/GI: Soft, ND, NT  Extremities/MSK: No edema  Skin: + Jaundice  Neuro: Alert and oriented x4  Psych: good affect    Lines/Tubes/Drains:   Peripheral IV 02/01/18 Right;Posterior Upper forearm (Active)   Site Assessment WDL 2/4/2018  9:00 AM   Line Status Saline locked 2/4/2018  9:00 AM   Phlebitis Scale 0-->no symptoms 2/4/2018  9:00 AM   Infiltration Scale 0 2/4/2018  9:00 AM   Infiltration Site Treatment Method  None 2/4/2018  9:00 AM   Extravasation? No 2/4/2018  9:00 AM   Number of days:3              Pending Results:   none         Discharge Medications:     Discharge Medication List as of 2/4/2018 11:04 AM      START taking these medications    Details   folic acid (FOLVITE) 1 MG tablet Take 1 tablet (1 mg) by mouth daily, Disp-30 tablet, R-0, E-Prescribe      pantoprazole (PROTONIX) 40 MG EC tablet Take 1 tablet (40 mg) by mouth every morning, Disp-30 tablet, R-0, E-Prescribe      thiamine 100 MG tablet Take 1 tablet (100 mg) by mouth daily, Disp-30 tablet, R-0, E-Prescribe      acamprosate (CAMPRAL) 333 MG EC tablet Take 2 tablets (666 mg) by mouth 3 times daily, Disp-90 tablet, R-0, E-Prescribe                  Discharge Instructions and Follow-Up:     Discharge Procedure Orders  Reason for your hospital stay   Order Comments: Alcohol withdrawal  Hepatitis     Adult [facility] Follow-up and recommended labs and tests   Order Comments: Follow up with primary care provider, Elle Aguilar, within 7 days for hospital follow- up.  The following labs/tests are recommended: CMP.      Appointments on [facility] and/or [facility] (with [facility] or [facility] provider or service). Call [phone] if you haven't heard regarding these appointments within 7 days of discharge.     Activity   Order Comments: Your activity upon discharge: activity as tolerated   Order Specific Question Answer Comments    Is discharge order? Yes      Full Code     Diet   Order Comments: Follow this diet upon discharge: Orders Placed This Encounter     2 Gram Sodium Diet   Order Specific Question Answer Comments   Is discharge order? Yes                  Discharge Disposition:   Home         Condition on Discharge:   Discharge condition: Stable   Code status on discharge: Full Code        Date of service: 2/4/2018     < 30 minutes spent in discharge, including >50% in counseling and coordination of care, medication review and plan of care recommended on follow up. Questions were answered.     Mayelin Solomon  Internal Medicine Hospitalist & Staff Physician  [facility]

## 2018-02-05 ENCOUNTER — DOCUMENTATION ONLY (OUTPATIENT)
Dept: PHARMACY | Facility: CLINIC | Age: 53
End: 2018-02-05

## 2018-02-05 NOTE — PROGRESS NOTES
Prior Authorization Approval    acamprosate calcium 333mg tabs  Date Initiated: 02/05/2018  Date Completed: 02/06/2018  Prior Auth Type: Clinical     Status: Approved    Effective Date: 02/06/2018 - 02/05/2019  Copay: 0.00  [place] Filled: Yes    Insurance: [employer] [facility]  Ph: [phone]  ID: [id number]  Case Number: n/a  Submitted Via: Faricky Mccurdy  Pharmacy Liaison  Ph: [phone] Page: [phone]

## 2018-02-06 ENCOUNTER — CARE COORDINATION (OUTPATIENT)
Dept: CARE COORDINATION | Facility: CLINIC | Age: 53
End: 2018-02-06

## 2018-02-06 NOTE — PROGRESS NOTES
I called the only number provided and a woman named Virginia answered and said Mario doesn't live with her, she said she thinks he is in a home or treatment place to which she does not know number so patient can't be reached and post DC follow up will be stopped

## 2018-02-18 ENCOUNTER — HOSPITAL ENCOUNTER (EMERGENCY)
Facility: CLINIC | Age: 53
Discharge: HOME | End: 2018-02-18
Attending: EMERGENCY MEDICINE | Admitting: EMERGENCY MEDICINE
Payer: COMMERCIAL

## 2018-02-18 VITALS
SYSTOLIC BLOOD PRESSURE: 114 MMHG | TEMPERATURE: 97.7 F | DIASTOLIC BLOOD PRESSURE: 78 MMHG | WEIGHT: 157 LBS | RESPIRATION RATE: 16 BRPM | OXYGEN SATURATION: 98 % | HEART RATE: 62 BPM | BODY MASS INDEX: 23.52 KG/M2

## 2018-02-18 DIAGNOSIS — R21 RASH: ICD-10-CM

## 2018-02-18 PROCEDURE — 99283 EMERGENCY DEPT VISIT LOW MDM: CPT | Performed by: EMERGENCY MEDICINE

## 2018-02-18 PROCEDURE — 87070 CULTURE OTHR SPECIMN AEROBIC: CPT | Performed by: EMERGENCY MEDICINE

## 2018-02-18 PROCEDURE — 87252 VIRUS INOCULATION TISSUE: CPT | Performed by: EMERGENCY MEDICINE

## 2018-02-18 PROCEDURE — 99284 EMERGENCY DEPT VISIT MOD MDM: CPT | Mod: Z6 | Performed by: EMERGENCY MEDICINE

## 2018-02-18 RX ORDER — TRIAMCINOLONE ACETONIDE 1 MG/G
CREAM TOPICAL
Qty: 453.6 G | Refills: 0 | Status: SHIPPED | OUTPATIENT
Start: 2018-02-18 | End: 2018-03-04

## 2018-02-18 RX ORDER — DIAPER,BRIEF,INFANT-TODD,DISP
EACH MISCELLANEOUS
COMMUNITY
End: 2018-06-23

## 2018-02-18 RX ORDER — DOXYCYCLINE 100 MG/1
100 CAPSULE ORAL 2 TIMES DAILY
Qty: 20 CAPSULE | Refills: 0 | Status: SHIPPED | OUTPATIENT
Start: 2018-02-18 | End: 2018-02-28

## 2018-02-18 ASSESSMENT — ENCOUNTER SYMPTOMS
WEAKNESS: 0
SORE THROAT: 0
CHILLS: 0
SHORTNESS OF BREATH: 0
NUMBNESS: 0
FEVER: 0

## 2018-02-18 NOTE — ED PROVIDER NOTES
"  History     Chief Complaint   Patient presents with     Rash     Pt c/o rash on back of legs x 1 week. Pt c/o itching and rash moving up back     HPI  Mario Lamar II is a 52 year old male with a history of psoriasis, PTSD, alcoholism, and chronic alcoholic pancreatitis who presents to the emergency department for evaluation of rash.  The patient was recently admitted from 1/31/2018 to 2/4/2018 (discharged 14 days ago) after presenting to the emergency department with complaint of abdominal pain.  Initial concern was for choledocholithiasis based on abdominal ultrasound, but an MRCP was done and negative for any intra or extrahepatic biliary duct dilatation.  Maddrey score was 25, Hepatology did not recommend steroids.  It was thought that the patient's abdominal pain was likely secondary to gastritis, so the patient was started on a PPI and discharged with a prescription for that, thiamine, and folate.    The patient now presents to the emergency department stating that he has had a pruritic rash in the posterior aspect of his legs and arms bilaterally for approximately 1 week, and it is now traveling up towards his back. The patient reports that these were initially not painful, but they are now associated with a \"burning\" pain. He feels like he had a similar rash while working at [employer] in 2009, thinks it was from the grease at that time. No other SOB, throat pain, numbness, or tingling. The patient offers no other concerns or complaints at this time.         PAST MEDICAL HISTORY:   Past Medical History:   Diagnosis Date     Depression      Pancreatitis      PTSD (post-traumatic stress disorder)      Seizures (H)     withdrawl     Substance abuse     alcohol abuse       PAST SURGICAL HISTORY:   Past Surgical History:   Procedure Laterality Date     ORTHOPEDIC SURGERY      Right ankle orthopedic surgery after fracture.       FAMILY HISTORY:   Family History   Problem Relation Age of Onset     " Alcoholism Sister        SOCIAL HISTORY:   Social History   Substance Use Topics     Smoking status: Former Smoker     Packs/day: 0.50     Years: 33.00     Quit date: 10/26/2017     Smokeless tobacco: Former User     Alcohol use No      Comment: Used to drink daily. Last drink was on 1/31/2018.       Discharge Medication List as of 2/18/2018  1:28 PM      START taking these medications    Details   doxycycline (VIBRAMYCIN) 100 MG capsule Take 1 capsule (100 mg) by mouth 2 times daily for 10 days, Disp-20 capsule, R-0, Local Print      triamcinolone (KENALOG) 0.1 % cream Apply to your rash after bleach bath once daily.  Do not apply to your face.Disp-453.6 g, R-0Local Print         CONTINUE these medications which have NOT CHANGED    Details   folic acid (FOLVITE) 1 MG tablet Take 1 tablet (1 mg) by mouth daily, Disp-30 tablet, R-0, E-Prescribe      pantoprazole (PROTONIX) 40 MG EC tablet Take 1 tablet (40 mg) by mouth every morning, Disp-30 tablet, R-0, E-Prescribe      hydrocortisone 1 % ointment Historical      thiamine 100 MG tablet Take 1 tablet (100 mg) by mouth daily, Disp-30 tablet, R-0, E-Prescribe      acamprosate (CAMPRAL) 333 MG EC tablet Take 2 tablets (666 mg) by mouth 3 times daily, Disp-90 tablet, R-0, E-Prescribe              No Known Allergies    I have reviewed the Medications, Allergies, Past Medical and Surgical History, and Social History in the Epic system.    Review of Systems   Constitutional: Negative for chills and fever.   HENT: Negative for sore throat.    Respiratory: Negative for shortness of breath.    Skin: Positive for rash (pruritic).   Neurological: Negative for weakness and numbness.   All other systems reviewed and are negative.      Physical Exam   BP: 115/82  Pulse: 69  Temp: 97.7  F (36.5  C)  Resp: 16  Weight: 71.2 kg (157 lb)  SpO2: 99 %      Physical Exam   Constitutional: He is oriented to person, place, and time. He appears well-developed and well-nourished. No distress.    HENT:   Head: Normocephalic and atraumatic.   Right Ear: External ear normal.   Left Ear: External ear normal.   Nose: Nose normal.   Mouth/Throat: Oropharynx is clear and moist.   Eyes: EOM are normal. Right eye exhibits no discharge. Left eye exhibits no discharge. No scleral icterus.   Neck: Normal range of motion. Neck supple.   Cardiovascular: Normal rate and regular rhythm.    Pulmonary/Chest: Effort normal.   Musculoskeletal: Normal range of motion. He exhibits no edema or tenderness.   Lymphadenopathy:     He has no cervical adenopathy.   Neurological: He is alert and oriented to person, place, and time.   Skin: Rash noted. He is not diaphoretic.   Psychiatric: He has a normal mood and affect.   Nursing note and vitals reviewed.                                                        ED Course     ED Course     Procedures           No results found for this or any previous visit (from the past 24 hour(s)).       Labs Ordered and Resulted from Time of ED Arrival Up to the Time of Departure from the ED - No data to display         Assessments & Plan (with Medical Decision Making)   The patient presents with rash.  The case with discussed with dermatology staff who reviewed the images.  He feels that there is a folliculitis component.  He has requested we place the patient on doxycycline in case of staff infection and to cover mrsa.  He would like to have the patient sit in a bleach bath every day or every other day to help the lesions dry and heal and then apply triamcinolone to his body, not face.  They will call and schedule him for a f/u appointment in their clinic in 1 week.  He says with the excoriations it is difficult to determine for sure the cause of this rash.      I have reviewed the nursing notes.    I have reviewed the findings, diagnosis, plan and need for follow up with the patient.    Discharge Medication List as of 2/18/2018  1:28 PM      START taking these medications    Details    doxycycline (VIBRAMYCIN) 100 MG capsule Take 1 capsule (100 mg) by mouth 2 times daily for 10 days, Disp-20 capsule, R-0, Local Print      triamcinolone (KENALOG) 0.1 % cream Apply to your rash after bleach bath once daily.  Do not apply to your face.Disp-453.6 g, R-0Local Print             Final diagnoses:   Rash   I, Cade Trivedi, am serving as a trained medical scribe to document services personally performed by Soledad Gann MD, based on the provider's statements to me.      I, Soledad Gann MD, was physically present and have reviewed and verified the accuracy of this note documented by Cade Trivedi.         2/18/2018   [facility], [place], EMERGENCY DEPARTMENT     Soledad Gann MD  02/21/18 [id number]

## 2018-02-18 NOTE — ED AVS SNAPSHOT
[facility], Emergency Department    [address]    [place] [address]    Phone:  [phone]    Fax:  [phone]                                       Mario Lamar II   MRN: [id number]    Department:  [facility], Emergency Department   Date of Visit:  2/18/2018           Patient Information     Date Of Birth          1965        Your diagnoses for this visit were:     Rash        You were seen by Soledad Gann MD.        Discharge Instructions       Please make an appointment to follow up with Dermatology Clinic (phone: [phone]) in 1 week.  They should call you to schedule an appointment in one week.  If you do not hear from them by Tuesday morning, please call them and schedule.     Take doxycycline 100 mg twice daily for 10 days for a possible bacterial infection causing your rash.     Sit in a bleach bath 15 minutes every evening.  After the bath, dry off and apply triamcinolone cream to your rash.  Do not put this on your face.      To make the bleach bath put 1/2 cup of plain bleach into the bathtub and fill the bathtub with warm water.  Sit in this bath for 15 minutes.  The bleach helps with preventing infection and drying the rash out.         24 Hour Appointment Hotline       To make an appointment at any [place] clinic, call [phone] ([phone]). If you don't have a family doctor or clinic, we will help you find one. [place] clinics are conveniently located to serve the needs of you and your family.             Review of your medicines      START taking        Dose / Directions Last dose taken    doxycycline 100 MG capsule   Commonly known as:  VIBRAMYCIN   Dose:  100 mg   Quantity:  20 capsule        Take 1 capsule (100 mg) by mouth 2 times daily for 10 days   Refills:  0        triamcinolone 0.1 % cream   Commonly known as:  KENALOG   Quantity:  453.6 g        Apply to your rash after bleach bath once daily.  Do not apply to your face.   Refills:  0           Our records show that you are taking the medicines listed below. If these are incorrect, please call your family doctor or clinic.        Dose / Directions Last dose taken    acamprosate 333 MG EC tablet   Commonly known as:  CAMPRAL   Dose:  666 mg   Quantity:  90 tablet        Take 2 tablets (666 mg) by mouth 3 times daily   Refills:  0        folic acid 1 MG tablet   Commonly known as:  FOLVITE   Dose:  1 mg   Quantity:  30 tablet        Take 1 tablet (1 mg) by mouth daily   Refills:  0        hydrocortisone 1 % ointment        Refills:  0        pantoprazole 40 MG EC tablet   Commonly known as:  PROTONIX   Dose:  40 mg   Quantity:  30 tablet        Take 1 tablet (40 mg) by mouth every morning   Refills:  0        thiamine 100 MG tablet   Dose:  100 mg   Quantity:  30 tablet        Take 1 tablet (100 mg) by mouth daily   Refills:  0                Prescriptions were sent or printed at these locations (2 Prescriptions)                   Other Prescriptions                Printed at Department/Unit printer (2 of 2)         doxycycline (VIBRAMYCIN) 100 MG capsule               triamcinolone (KENALOG) 0.1 % cream                Procedures and tests performed during your visit     Skin Culture Aerobic Bacterial    Viral Culture Non-respiratory      Orders Needing Specimen Collection     None      Pending Results     Date and Time Order Name Status Description    2/18/2018 1251 Skin Culture Aerobic Bacterial In process     2/18/2018 1251 Viral Culture Non-respiratory In process             Pending Culture Results     Date and Time Order Name Status Description    2/18/2018 1251 Skin Culture Aerobic Bacterial In process     2/18/2018 1251 Viral Culture Non-respiratory In process             Pending Results Instructions     If you had any lab results that were not finalized at the time of your Discharge, you can call the ED Lab Result RN at [phone]. You will be contacted by this team for any positive Lab  "results or changes in treatment. The nurses are available 7 days a week from 10A to 6:30P.  You can leave a message 24 hours per day and they will return your call.        Thank you for choosing [facility]       Thank you for choosing [facility] for your care. Our goal is always to provide you with excellent care. Hearing back from our patients is one way we can continue to improve our services. Please take a few minutes to complete the written survey that you may receive in the mail after you visit with us. Thank you!        [facility] Information     [employer] lets you send messages to your doctor, view your test results, renew your prescriptions, schedule appointments and more. To sign up, go to [email or web address] . Click on \"Log in\" on the left side of the screen, which will take you to the Welcome page. Then click on \"Sign up Now\" on the right side of the page.     You will be asked to enter the access code listed below, as well as some personal information. Please follow the directions to create your username and password.     Your access code is: [email or web address]  Expires: 3/1/2018  9:03 AM     Your access code will  in 90 days. If you need help or a new code, please call your [facility] clinic or [phone].        Care EveryWhere ID     This is your Care EveryWhere ID. This could be used by other organizations to access your [facility] medical records  [id number]        Equal Access to Services     DEANN ROSENBAUM : Hadii lili Bender, wamargaritada cole, qacorby annaalquentin quiñones . So [facility] [phone].    ATENCIÓN: Si habla español, tiene a lo disposición servicios gratuitos de asistencia lingüística. Alayna al [phone].    We comply with applicable federal civil rights laws and [place] laws. We do not discriminate on the basis of race, color, national origin, age, disability, sex, sexual orientation, or gender identity.            After Visit Summary     "   This is your record. Keep this with you and show to your community pharmacist(s) and doctor(s) at your next visit.

## 2018-02-18 NOTE — DISCHARGE INSTRUCTIONS
Please make an appointment to follow up with Dermatology Clinic (phone: [phone]) in 1 week.  They should call you to schedule an appointment in one week.  If you do not hear from them by Tuesday morning, please call them and schedule.     Take doxycycline 100 mg twice daily for 10 days for a possible bacterial infection causing your rash.     Sit in a bleach bath 15 minutes every evening.  After the bath, dry off and apply triamcinolone cream to your rash.  Do not put this on your face.      To make the bleach bath put 1/2 cup of plain bleach into the bathtub and fill the bathtub with warm water.  Sit in this bath for 15 minutes.  The bleach helps with preventing infection and drying the rash out.

## 2018-02-18 NOTE — ED AVS SNAPSHOT
[facility], Emergency Department    [address]    [facility] [address]    Phone:  [phone]    Fax:  [phone]                                       Mario Lamar II   MRN: [id number]    Department:  [facility], Emergency Department   Date of Visit:  2/18/2018           After Visit Summary Signature Page     I have received my discharge instructions, and my questions have been answered. I have discussed any challenges I see with this plan with the nurse or doctor.    ..........................................................................................................................................  Patient/Patient Representative Signature      ..........................................................................................................................................  Patient Representative Print Name and Relationship to Patient    ..................................................               ................................................  Date                                            Time    ..........................................................................................................................................  Reviewed by Signature/Title    ...................................................              ..............................................  Date                                                            Time

## 2018-02-20 ENCOUNTER — TELEPHONE (OUTPATIENT)
Dept: DERMATOLOGY | Facility: CLINIC | Age: 53
End: 2018-02-20

## 2018-02-20 LAB
BACTERIA SPEC CULT: NO GROWTH
SPECIMEN SOURCE: NORMAL

## 2018-02-20 NOTE — TELEPHONE ENCOUNTER
Mario does not have a phone. I left a message at his apartment  to call back to schedule an appointment - per Dr. Gillis

## 2018-03-03 LAB
SPECIMEN SOURCE: NORMAL
VIRUS SPEC CULT: NORMAL
VIRUS SPEC CULT: NORMAL

## 2018-04-09 ENCOUNTER — APPOINTMENT (OUTPATIENT)
Dept: GENERAL RADIOLOGY | Facility: CLINIC | Age: 53
End: 2018-04-09
Attending: EMERGENCY MEDICINE
Payer: COMMERCIAL

## 2018-04-09 ENCOUNTER — HOSPITAL ENCOUNTER (EMERGENCY)
Facility: CLINIC | Age: 53
Discharge: HOME | End: 2018-04-09
Attending: EMERGENCY MEDICINE | Admitting: EMERGENCY MEDICINE
Payer: COMMERCIAL

## 2018-04-09 ENCOUNTER — TELEPHONE (OUTPATIENT)
Dept: ORTHOPEDICS | Facility: CLINIC | Age: 53
End: 2018-04-09

## 2018-04-09 VITALS
OXYGEN SATURATION: 99 % | RESPIRATION RATE: 18 BRPM | WEIGHT: 145 LBS | DIASTOLIC BLOOD PRESSURE: 88 MMHG | BODY MASS INDEX: 21.73 KG/M2 | TEMPERATURE: 98.1 F | SYSTOLIC BLOOD PRESSURE: 127 MMHG

## 2018-04-09 DIAGNOSIS — S42.011A CLOSED ANTERIOR DISPLACED FRACTURE OF STERNAL END OF RIGHT CLAVICLE, INITIAL ENCOUNTER: ICD-10-CM

## 2018-04-09 DIAGNOSIS — S00.511A ABRASION OF LIP, INITIAL ENCOUNTER: ICD-10-CM

## 2018-04-09 PROCEDURE — 25000132 ZZH RX MED GY IP 250 OP 250 PS 637: Performed by: EMERGENCY MEDICINE

## 2018-04-09 PROCEDURE — 73000 X-RAY EXAM OF COLLAR BONE: CPT | Mod: RT

## 2018-04-09 PROCEDURE — 99284 EMERGENCY DEPT VISIT MOD MDM: CPT | Mod: Z6 | Performed by: EMERGENCY MEDICINE

## 2018-04-09 PROCEDURE — 99283 EMERGENCY DEPT VISIT LOW MDM: CPT | Performed by: EMERGENCY MEDICINE

## 2018-04-09 RX ORDER — OXYCODONE HYDROCHLORIDE 5 MG/1
5 TABLET ORAL EVERY 6 HOURS PRN
Qty: 14 TABLET | Refills: 0 | Status: SHIPPED | OUTPATIENT
Start: 2018-04-09 | End: 2018-06-23

## 2018-04-09 RX ORDER — OXYCODONE HYDROCHLORIDE 5 MG/1
5 TABLET ORAL ONCE
Status: COMPLETED | OUTPATIENT
Start: 2018-04-09 | End: 2018-04-09

## 2018-04-09 RX ORDER — PENICILLIN V POTASSIUM 500 MG/1
500 TABLET, FILM COATED ORAL 4 TIMES DAILY
Qty: 20 TABLET | Refills: 0 | Status: SHIPPED | OUTPATIENT
Start: 2018-04-09 | End: 2018-04-14

## 2018-04-09 RX ADMIN — OXYCODONE HYDROCHLORIDE 5 MG: 5 TABLET ORAL at 07:57

## 2018-04-09 ASSESSMENT — ENCOUNTER SYMPTOMS
DIZZINESS: 0
HEADACHES: 0
CHILLS: 0
FEVER: 0

## 2018-04-09 NOTE — ED AVS SNAPSHOT
[facility], Emergency Department    [address]    [place] [address]    Phone:  [phone]    Fax:  [phone]                                       Mario Lamar II   MRN: [id number]    Department:  [facility], Emergency Department   Date of Visit:  4/9/2018           After Visit Summary Signature Page     I have received my discharge instructions, and my questions have been answered. I have discussed any challenges I see with this plan with the nurse or doctor.    ..........................................................................................................................................  Patient/Patient Representative Signature      ..........................................................................................................................................  Patient Representative Print Name and Relationship to Patient    ..................................................               ................................................  Date                                            Time    ..........................................................................................................................................  Reviewed by Signature/Title    ...................................................              ..............................................  Date                                                            Time

## 2018-04-09 NOTE — ED AVS SNAPSHOT
[facility], Emergency Department    [address]    [place] [address]    Phone:  [phone]    Fax:  [phone]                                       Mario Lamar II   MRN: [id number]    Department:  [facility], Emergency Department   Date of Visit:  4/9/2018           Patient Information     Date Of Birth          1965        Your diagnoses for this visit were:     Closed anterior displaced fracture of sternal end of right clavicle, initial encounter     Abrasion of lip, initial encounter        You were seen by Soledad Gann MD.        Discharge Instructions       Please make an appointment to follow up with Orthopedics (phone: [phone]) as soon as possible. You may need surgery to fix this injury.  Please call them today and schedule.     Wear the shoulder immobilizer AT ALL TIMES unless you are showering.     You can take oxycodone for pain if needed.     Please take penicillin for 5 days to avoid lower lip infection.       24 Hour Appointment Hotline       To make an appointment at any [facility] clinic, call [phone] ([phone]). If you don't have a family doctor or clinic, we will help you find one. [facility] clinics are conveniently located to serve the needs of you and your family.          ED Discharge Orders     Follow up with Orthopaedics [facility]       You should receive a call from [facility] to schedule your follow up appointment. If you do not hear from them within 24 business hours, call [phone], option 3 for help in scheduling your follow up.  If you are seen in the Emergency Department over the weekend, you will receive a phone call on the next business day.            SHOULDER IMMOBILIZER                    Review of your medicines      START taking        Dose / Directions Last dose taken    oxyCODONE IR 5 MG tablet   Commonly known as:  ROXICODONE   Dose:  5 mg   Quantity:  14 tablet        Take 1 tablet (5 mg) by mouth every  6 hours as needed for pain   Refills:  0        penicillin V potassium 500 MG tablet   Commonly known as:  VEETID   Dose:  500 mg   Quantity:  20 tablet        Take 1 tablet (500 mg) by mouth 4 times daily for 5 days   Refills:  0          Our records show that you are taking the medicines listed below. If these are incorrect, please call your family doctor or clinic.        Dose / Directions Last dose taken    hydrocortisone 1 % ointment        Refills:  0        PROZAC PO        Refills:  0                Prescriptions were sent or printed at these locations (2 Prescriptions)                   Other Prescriptions                Printed at Department/Unit printer (2 of 2)         penicillin V potassium (VEETID) 500 MG tablet               oxyCODONE IR (ROXICODONE) 5 MG tablet                Procedures and tests performed during your visit     Clavicle XR, right      Orders Needing Specimen Collection     None      Pending Results     No orders found from 4/7/2018 to 4/10/2018.            Pending Culture Results     No orders found from 4/7/2018 to 4/10/2018.            Pending Results Instructions     If you had any lab results that were not finalized at the time of your Discharge, you can call the ED Lab Result RN at [phone]. You will be contacted by this team for any positive Lab results or changes in treatment. The nurses are available 7 days a week from 10A to 6:30P.  You can leave a message 24 hours per day and they will return your call.        Thank you for choosing [facility]       Thank you for choosing [facility] for your care. Our goal is always to provide you with excellent care. Hearing back from our patients is one way we can continue to improve our services. Please take a few minutes to complete the written survey that you may receive in the mail after you visit with us. Thank you!        "[employer]hart Information     [employer] lets you send messages to your doctor, view your test results, renew your  "prescriptions, schedule appointments and more. To sign up, go to [email or web address] . Click on \"Log in\" on the left side of the screen, which will take you to the Welcome page. Then click on \"Sign up Now\" on the right side of the page.     You will be asked to enter the access code listed below, as well as some personal information. Please follow the directions to create your username and password.     Your access code is: [email or web address]  Expires: 2018  8:46 AM     Your access code will  in 90 days. If you need help or a new code, please call your [facility] clinic or [phone].        Care EveryWhere ID     This is your Care EveryWhere ID. This could be used by other organizations to access your [facility] medical records  [id number]        Equal Access to Services     DEANN ROSENBAUM : Geri Bender, marko galvan, arelis barrios, quentin perez . So [facility] [phone].    ATENCIÓN: Si habla español, tiene a lo disposición servicios gratuitos de asistencia lingüística. Llame al [phone].    We comply with applicable federal civil rights laws and [place] laws. We do not discriminate on the basis of race, color, national origin, age, disability, sex, sexual orientation, or gender identity.            After Visit Summary       This is your record. Keep this with you and show to your community pharmacist(s) and doctor(s) at your next visit.                  "

## 2018-04-09 NOTE — ED PROVIDER NOTES
History     Chief Complaint   Patient presents with     Shoulder Pain     fell in shower on Sat night, right collarbone     HPI  Mario Lamar II is a 52 year old male who presents with right clavicle pain.  He says he slipped in the bathtub 2 days ago and struck his right face and clavicle on the bathtub.  He is having pain over the medial clavicle.  He also injured his lip.  He denies loc.  No neck pain. No headache.  He denies dental issues. He says that he has an appointment to see a dentist on April 24th.  He denies sob.  He says he use to drink daily but is cutting back.  He says his girlfriend is wanting him to quit.  He says he quit smoking as well.  He is followed by the [facility] clinic for mental health issues as well.  He denies seizure.  He is left hand dominant.     I have reviewed the Medications, Allergies, Past Medical and Surgical History, and Social History in the Epic system.    Review of Systems   Constitutional: Negative for chills and fever.   Cardiovascular: Positive for chest pain (clavical pain).   Neurological: Negative for dizziness and headaches.   All other systems reviewed and are negative.      Physical Exam   BP: (!) 130/104  Heart Rate: 76  Temp: 98.1  F (36.7  C)  Resp: 18  Weight: 65.8 kg (145 lb)  SpO2: 99 %      Physical Exam   Constitutional: He is oriented to person, place, and time. He appears well-developed and well-nourished. No distress.   HENT:   Head: Normocephalic and atraumatic.   Right Ear: External ear normal.   Left Ear: External ear normal.   Nose: Nose normal.   Lower lip swollen with buccal healing abrasion.    Eyes: EOM are normal. Pupils are equal, round, and reactive to light.   Neck: Normal range of motion. Neck supple.   Non tender   Cardiovascular: Normal rate, regular rhythm, normal heart sounds and intact distal pulses.    Pulmonary/Chest: Effort normal and breath sounds normal. He exhibits tenderness.       Abdominal: Soft. Bowel sounds are  normal. He exhibits no distension and no mass. There is no tenderness. There is no rebound and no guarding.   Musculoskeletal:   Hold his right arm to his torso.  No pain of right shoulder with prom.  No pain of arm itself with palpation.    Neurological: He is alert and oriented to person, place, and time.   Skin: Skin is warm and dry. He is not diaphoretic.   Psychiatric: He has a normal mood and affect.   Nursing note and vitals reviewed.      ED Course     ED Course     Procedures        Labs Ordered and Resulted from Time of ED Arrival Up to the Time of Departure from the ED - No data to display  Results for orders placed or performed during the hospital encounter of 04/09/18 (from the past 24 hour(s))   Clavicle XR, right    Narrative    RIGHT CLAVICLE TWO VIEWS   4/9/2018 8:14 AM     HISTORY:  Pain.       Impression    IMPRESSION:   1. There is a comminuted fracture of the medial clavicle with  mild-moderate displacement.  2. There is an old fracture deformity of the distal clavicle, also  present on chest x-ray from 12/20/2016. There is underlying  displacement.    BART HAYES MD            Assessments & Plan (with Medical Decision Making)   The patient fell 2 days ago in the bathtub.  He says he slipped and struck his face on the corner of the bathtub.  He has a abrasion of the buccal mucosa and the lip is swollen. I will treat with pcn for 5 days to avoid infection.  He has pain over the medial clavicle and I suspect he has broken it.  Xray was done. He was given oxycodone 1 pill.  Xray shows communited clavicle fracture.  Placed in shoulder immobilizer.  Discussed with ortho who says will see outpatient, may need surgery.  Discussed with patient. D/c home to keep in immobilizer at all times unless showering.     I have reviewed the nursing notes.    I have reviewed the findings, diagnosis, plan and need for follow up with the patient.    New Prescriptions    OXYCODONE IR (ROXICODONE) 5 MG TABLET    Take 1  tablet (5 mg) by mouth every 6 hours as needed for pain    PENICILLIN V POTASSIUM (VEETID) 500 MG TABLET    Take 1 tablet (500 mg) by mouth 4 times daily for 5 days       Final diagnoses:   Closed anterior displaced fracture of sternal end of right clavicle, initial encounter   Abrasion of lip, initial encounter       4/9/2018   [facility], [place], EMERGENCY DEPARTMENT     Soledad Gann MD  04/09/18 0845       Soledad Gann MD  04/09/18 0845

## 2018-04-09 NOTE — DISCHARGE INSTRUCTIONS
Please make an appointment to follow up with Orthopedics (phone: [phone]) as soon as possible. You may need surgery to fix this injury.  Please call them today and schedule.     Wear the shoulder immobilizer AT ALL TIMES unless you are showering.     You can take oxycodone for pain if needed.     Please take penicillin for 5 days to avoid lower lip infection.

## 2018-04-09 NOTE — TELEPHONE ENCOUNTER
Call center called for assistance scheduling patient to be seen in clinic.  Patient was seen in ED today for right clavicle injury sustained on 4/7/2018.  This was being worked on by INGE Luo who stated to have patient seen by Dr. Torres on 4/13/2018 using 2 return appointments.  Patient was offered 4/13/2018 at 1230.    Abeba Noland LPN

## 2018-04-09 NOTE — ED NOTES
Pt states he drank some Vodka sat night, then took shower, slipped in shower, hit right shoulder and bottom lip on floor. Pain in collarbone area since then.

## 2018-04-09 NOTE — LETTER
April 9, 2018      To Whom It May Concern:      Mario Toro Willard TRIPP was seen in our Emergency Department today, 04/09/18.  Please excuse him from school today.      Sincerely,        Soledad Gann MD

## 2018-04-10 ENCOUNTER — PRE VISIT (OUTPATIENT)
Dept: ORTHOPEDICS | Facility: CLINIC | Age: 53
End: 2018-04-10

## 2018-04-10 NOTE — TELEPHONE ENCOUNTER
FUTURE VISIT INFORMATION      FUTURE VISIT INFORMATION:    Date: 4/13    Time: 12:30    Location: [facility]  REFERRAL INFORMATION:    Referring provider:  Soledad Gann    Referring providers clinic:  [facility]    Reason for visit/diagnosis  closed anterior displaced fx of clavicle    RECORDS REQUESTED FROM:       Clinic name Comments Records Status Imaging Status   [facility] ER visit f/u internal internal                                   RECORDS STATUS

## 2018-04-11 ENCOUNTER — PRE VISIT (OUTPATIENT)
Dept: ORTHOPEDICS | Facility: CLINIC | Age: 53
End: 2018-04-11

## 2018-04-11 DIAGNOSIS — S42.009A CLAVICLE FRACTURE: Primary | ICD-10-CM

## 2018-04-11 NOTE — TELEPHONE ENCOUNTER
Patient is being referred by the ED for right sternal end clavicle fracture.    Patient is new to this provider.  Patient has outside records are available in [facility]    Patient is coming to clinic for initial consultation.      Per standing orders, AP bilateral clavicle and right clavicle AP and 15 degree cephalic tilt radiographs have been ordered and scheduled.     Patient visit type and questionnaires have been reviewed and are correct for this appointment? Yes    Lisbeth Caba ATC

## 2018-04-23 ENCOUNTER — PRE VISIT (OUTPATIENT)
Dept: ORTHOPEDICS | Facility: CLINIC | Age: 53
End: 2018-04-23

## 2018-04-23 NOTE — TELEPHONE ENCOUNTER
FUTURE VISIT INFORMATION      FUTURE VISIT INFORMATION:    Date: 4/25/18    Time:     Location:   REFERRAL INFORMATION:    Referring provider:  Dr.Cara Gann    Referring providers clinic:  [facility]    Reason for visit/diagnosis  R displaced clavicle fx    RECORDS REQUESTED FROM:       Clinic name Comments Records Status Imaging Status   [facility] R Clavicle fx- Dr.Cara Gann referring Internal Internal     RECORDS STATUS      RECORDS RECEIVED FROM: [facility]   DATE RECEIVED: 4/9/18   NOTES STATUS DETAILS   OFFICE NOTE from referring provider Internal 4/9/18   OFFICE NOTE from other specialist N/A    DISCHARGE SUMMARY from hospital N/A    DISCHARGE REPORT from the ER Internal 4/9/18   OPERATIVE REPORT N/A    MEDICATION LIST Internal    IMPLANT RECORD/STICKER N/A    LABS     CBC/DIFF N/A    CULTURES N/A    IMAGING  (NEED IMAGES & REPORTS) Internal  4/9/18   INJECTIONS DONE IN RADIOLOGY N/A    MRI N/A    CT SCAN N/A    XRAYS (IMAGES & REPORTS) Internal 4/9/18   TUMOR     PATHOLOGY  Slides & report N/A

## 2018-06-23 ENCOUNTER — HOSPITAL ENCOUNTER (EMERGENCY)
Facility: CLINIC | Age: 53
Discharge: HOME | End: 2018-06-23
Attending: INTERNAL MEDICINE | Admitting: INTERNAL MEDICINE
Payer: COMMERCIAL

## 2018-06-23 VITALS
WEIGHT: 156 LBS | HEART RATE: 109 BPM | SYSTOLIC BLOOD PRESSURE: 116 MMHG | BODY MASS INDEX: 23.64 KG/M2 | RESPIRATION RATE: 18 BRPM | HEIGHT: 68 IN | OXYGEN SATURATION: 96 % | TEMPERATURE: 98.2 F | DIASTOLIC BLOOD PRESSURE: 87 MMHG

## 2018-06-23 DIAGNOSIS — B37.2 YEAST INFECTION OF THE SKIN: ICD-10-CM

## 2018-06-23 DIAGNOSIS — K70.31 ALCOHOLIC CIRRHOSIS OF LIVER WITH ASCITES (H): ICD-10-CM

## 2018-06-23 LAB
ALBUMIN SERPL-MCNC: 2.7 G/DL (ref 3.4–5)
ALBUMIN UR-MCNC: 30 MG/DL
ALP SERPL-CCNC: 159 U/L (ref 40–150)
ALT SERPL W P-5'-P-CCNC: 22 U/L (ref 0–70)
ANION GAP SERPL CALCULATED.3IONS-SCNC: 11 MMOL/L (ref 3–14)
APPEARANCE UR: ABNORMAL
AST SERPL W P-5'-P-CCNC: 54 U/L (ref 0–45)
BASOPHILS # BLD AUTO: 0.1 10E9/L (ref 0–0.2)
BASOPHILS NFR BLD AUTO: 0.8 %
BILIRUB SERPL-MCNC: 1 MG/DL (ref 0.2–1.3)
BILIRUB UR QL STRIP: ABNORMAL
BUN SERPL-MCNC: 9 MG/DL (ref 7–30)
CALCIUM SERPL-MCNC: 7.9 MG/DL (ref 8.5–10.1)
CHLORIDE SERPL-SCNC: 101 MMOL/L (ref 94–109)
CO2 SERPL-SCNC: 26 MMOL/L (ref 20–32)
COLOR UR AUTO: ABNORMAL
CREAT SERPL-MCNC: 0.61 MG/DL (ref 0.66–1.25)
DIFFERENTIAL METHOD BLD: NORMAL
EOSINOPHIL # BLD AUTO: 0.1 10E9/L (ref 0–0.7)
EOSINOPHIL NFR BLD AUTO: 1.2 %
ERYTHROCYTE [DISTWIDTH] IN BLOOD BY AUTOMATED COUNT: 14.8 % (ref 10–15)
ETHANOL SERPL-MCNC: <0.01 G/DL
GFR SERPL CREATININE-BSD FRML MDRD: >90 ML/MIN/1.7M2
GLUCOSE SERPL-MCNC: 99 MG/DL (ref 70–99)
GLUCOSE UR STRIP-MCNC: NEGATIVE MG/DL
HCT VFR BLD AUTO: 42.2 % (ref 40–53)
HGB BLD-MCNC: 13.8 G/DL (ref 13.3–17.7)
HGB UR QL STRIP: NEGATIVE
IMM GRANULOCYTES # BLD: 0 10E9/L (ref 0–0.4)
IMM GRANULOCYTES NFR BLD: 0.2 %
INR PPP: 1.24 (ref 0.86–1.14)
KETONES UR STRIP-MCNC: 10 MG/DL
LEUKOCYTE ESTERASE UR QL STRIP: ABNORMAL
LYMPHOCYTES # BLD AUTO: 1.3 10E9/L (ref 0.8–5.3)
LYMPHOCYTES NFR BLD AUTO: 19.5 %
MAGNESIUM SERPL-MCNC: 1.5 MG/DL (ref 1.6–2.3)
MCH RBC QN AUTO: 29.7 PG (ref 26.5–33)
MCHC RBC AUTO-ENTMCNC: 32.7 G/DL (ref 31.5–36.5)
MCV RBC AUTO: 91 FL (ref 78–100)
MONOCYTES # BLD AUTO: 0.6 10E9/L (ref 0–1.3)
MONOCYTES NFR BLD AUTO: 9.3 %
MUCOUS THREADS #/AREA URNS LPF: PRESENT /LPF
NEUTROPHILS # BLD AUTO: 4.5 10E9/L (ref 1.6–8.3)
NEUTROPHILS NFR BLD AUTO: 69 %
NITRATE UR QL: NEGATIVE
NRBC # BLD AUTO: 0 10*3/UL
NRBC BLD AUTO-RTO: 0 /100
PH UR STRIP: 6 PH (ref 5–7)
PLATELET # BLD AUTO: 185 10E9/L (ref 150–450)
POTASSIUM SERPL-SCNC: 3.2 MMOL/L (ref 3.4–5.3)
PROT SERPL-MCNC: 6.5 G/DL (ref 6.8–8.8)
RBC # BLD AUTO: 4.65 10E12/L (ref 4.4–5.9)
RBC #/AREA URNS AUTO: 3 /HPF (ref 0–2)
SODIUM SERPL-SCNC: 137 MMOL/L (ref 133–144)
SOURCE: ABNORMAL
SP GR UR STRIP: 1.02 (ref 1–1.03)
TRANS CELLS #/AREA URNS HPF: 1 /HPF (ref 0–1)
UROBILINOGEN UR STRIP-MCNC: 2 MG/DL (ref 0–2)
WBC # BLD AUTO: 6.6 10E9/L (ref 4–11)
WBC #/AREA URNS AUTO: 49 /HPF (ref 0–5)

## 2018-06-23 PROCEDURE — 85025 COMPLETE CBC W/AUTO DIFF WBC: CPT | Performed by: INTERNAL MEDICINE

## 2018-06-23 PROCEDURE — 80053 COMPREHEN METABOLIC PANEL: CPT | Performed by: INTERNAL MEDICINE

## 2018-06-23 PROCEDURE — 99284 EMERGENCY DEPT VISIT MOD MDM: CPT | Mod: 25 | Performed by: INTERNAL MEDICINE

## 2018-06-23 PROCEDURE — 76705 ECHO EXAM OF ABDOMEN: CPT

## 2018-06-23 PROCEDURE — 81001 URINALYSIS AUTO W/SCOPE: CPT | Performed by: INTERNAL MEDICINE

## 2018-06-23 PROCEDURE — 76705 ECHO EXAM OF ABDOMEN: CPT | Mod: 26 | Performed by: INTERNAL MEDICINE

## 2018-06-23 PROCEDURE — 83735 ASSAY OF MAGNESIUM: CPT | Performed by: INTERNAL MEDICINE

## 2018-06-23 PROCEDURE — 85610 PROTHROMBIN TIME: CPT | Performed by: INTERNAL MEDICINE

## 2018-06-23 PROCEDURE — 80320 DRUG SCREEN QUANTALCOHOLS: CPT | Performed by: INTERNAL MEDICINE

## 2018-06-23 PROCEDURE — 99284 EMERGENCY DEPT VISIT MOD MDM: CPT | Mod: 25

## 2018-06-23 RX ORDER — CLOTRIMAZOLE 1 %
CREAM (GRAM) TOPICAL 2 TIMES DAILY
Qty: 45 G | Refills: 0 | Status: SHIPPED | OUTPATIENT
Start: 2018-06-23 | End: 2018-07-08

## 2018-06-23 RX ORDER — SPIRONOLACTONE 25 MG/1
25 TABLET ORAL 2 TIMES DAILY
Qty: 60 TABLET | Refills: 0 | Status: ON HOLD | OUTPATIENT
Start: 2018-06-23 | End: 2021-10-11

## 2018-06-23 ASSESSMENT — ENCOUNTER SYMPTOMS
ABDOMINAL PAIN: 1
FEVER: 0
CONSTIPATION: 0
BLOOD IN STOOL: 0
DIARRHEA: 0
ABDOMINAL DISTENTION: 1

## 2018-06-23 NOTE — ED AVS SNAPSHOT
[facility], [place], Emergency Department    [address] [place] [address]    Phone:  [phone]                                       Mario Lamar II   MRN: [id number]    Department:  [facility], Emergency Department   Date of Visit:  6/23/2018           After Visit Summary Signature Page     I have received my discharge instructions, and my questions have been answered. I have discussed any challenges I see with this plan with the nurse or doctor.    ..........................................................................................................................................  Patient/Patient Representative Signature      ..........................................................................................................................................  Patient Representative Print Name and Relationship to Patient    ..................................................               ................................................  Date                                            Time    ..........................................................................................................................................  Reviewed by Signature/Title    ...................................................              ..............................................  Date                                                            Time

## 2018-06-23 NOTE — ED PROVIDER NOTES
[facility] EMERGENCY DE[facility]MENT ([facility])  6/23/18   History     Chief Complaint   Patient presents with     Abdominal Pain     HPI  Mario Lamar II is a 53 year old male with a medical history significant for substance abuse parents he primarily alcohol currently), TBI, pancreatitis, S/P cholecystectomy (5/27/2018) who was recently admitted to [facility] from 6/21/2018 to 6/22/2018 after presenting to the Emergency Department with abdominal pain, distention and vomiting.  Patient was treated with KCI and magnesium for hypokalemia with potassium of 2.3 while in the Emergency Department.  Patient's CT at that time revealed significant ascites, his HIDA scan without evidence of biliary leak.  SAAG 1.3 consistent with portal hypertension.  Patient's ultrasound at that time showed no evidence of portal vein thrombosis.  Patient was managed with furosemide 20 mg daily and was told to continue this medication as an outpatient.  Patient had a diagnostic paracentesis performed with clear yellow ascitic fluid return.   Patient was also noted to have a maculopapular rash, which he stated he has had for several months on the abdomen and legs.  The etiology was unclear, but contact dermatitis was thought to be most likely.  Patient was treated with hydrocortisone cream 1% and calamine lotion.    Patient presents to the Emergency Department today for evaluation of abdominal pain and rash.  The patient states that since being discharged from [facility], he has had no significant improvement of his pain.  He notes that he has continued to have abdominal distention as well.  He denies any fevers, diarrhea or other abnormal bowel movements.  Patient does note that he continues to have his rash with no improvement.  He states that the rashes on his abdomen, legs and back.  Patient has been using the hydrocortisone cream.  He has also been using Tylenol and ibuprofen for abdominal pain.    I have reviewed the  "Medications, Allergies, Past Medical and Surgical History, and Social History in the [facility] system.    Past Medical History:   Diagnosis Date     Depression      Pancreatitis      PTSD (post-traumatic stress disorder)      Seizures (H)     withdrawl     Substance abuse     alcohol abuse       Past Surgical History:   Procedure Laterality Date     ORTHOPEDIC SURGERY      Right ankle orthopedic surgery after fracture.       Family History   Problem Relation Age of Onset     Alcoholism Sister        Social History   Substance Use Topics     Smoking status: Former Smoker     Packs/day: 0.50     Years: 33.00     Quit date: 10/26/2017     Smokeless tobacco: Former User     Alcohol use Yes      Comment: last drink sat evening       No current facility-administered medications for this encounter.      Current Outpatient Prescriptions   Medication     clotrimazole (LOTRIMIN) 1 % cream     spironolactone (ALDACTONE) 25 MG tablet     FLUoxetine HCl (PROZAC PO)      No Known Allergies      Review of Systems   Constitutional: Negative for fever.   Gastrointestinal: Positive for abdominal distention and abdominal pain. Negative for blood in stool, constipation and diarrhea.   Skin: Positive for rash (abdomen, bilateral legs, back).   All other systems reviewed and are negative.      Physical Exam   BP: (!) 134/94  Pulse: 109  Heart Rate: 95  Temp: 98.2  F (36.8  C)  Resp: 16  Height: 172.7 cm (5' 8\")  Weight: 70.8 kg (156 lb)  SpO2: 95 %      Physical Exam   Constitutional: No distress.   HENT:   Head: Atraumatic.   Mouth/Throat: Oropharynx is clear and moist. No oropharyngeal exudate.   Eyes: Pupils are equal, round, and reactive to light. No scleral icterus.   Neck: Neck supple. No JVD present.   Cardiovascular: Normal rate, normal heart sounds and intact distal pulses.  Exam reveals no gallop and no friction rub.    No murmur heard.  Pulmonary/Chest: Effort normal and breath sounds normal. No respiratory distress. He has no " wheezes. He has no rales. He exhibits no tenderness.   Abdominal: Soft. Bowel sounds are normal. He exhibits distension. He exhibits no mass. There is no tenderness. There is no rebound and no guarding.   Musculoskeletal: He exhibits no edema or tenderness.   Neurological: He is alert. No cranial nerve deficit. Coordination normal.   Skin: Skin is warm. No rash noted. He is not diaphoretic.       ED Course   5:10 PM  The patient was seen and examined by Anusha Wall MD in Room ED14.     ED Course     Procedures  Results for orders placed during the hospital encounter of 06/23/18   POC US ABDOMEN LIMITED    Impression Bedside FAST (Focused Assessment with Sonography in Trauma), performed and interpreted by me.   Indication: Abdominal pain    With the patient in Trendelenburg, the RUQ, LUQ and subxiphoid views were examined for intraabdominal and thoracic free fluid and pericardial effusion. With the patient in reverse Trendelenburg, the suprapubic view was examined for intraabdominal free fluid. Image quality was satisfactory..     Findings: Abnormal. Large ascites.         IMPRESSION:  Negative FAST bu large ascites        Results for orders placed or performed during the hospital encounter of 06/23/18 (from the past 24 hour(s))   POC US ABDOMEN LIMITED     Status: None    Collection Time: 06/23/18  5:15 PM    Impression    Bedside FAST (Focused Assessment with Sonography in Trauma), performed and interpreted by me.   Indication: Abdominal pain    With the patient in Trendelenburg, the RUQ, LUQ and subxiphoid views were examined for intraabdominal and thoracic free fluid and pericardial effusion. With the patient in reverse Trendelenburg, the suprapubic view was examined for intraabdominal free fluid. Image quality was satisfactory..     Findings: Abnormal. Large ascites.         IMPRESSION:  Negative FAST bu large ascites   CBC with platelets differential     Status: None    Collection Time: 06/23/18  5:26 PM    Result Value Ref Range    WBC 6.6 4.0 - 11.0 10e9/L    RBC Count 4.65 4.4 - 5.9 10e12/L    Hemoglobin 13.8 13.3 - 17.7 g/dL    Hematocrit 42.2 40.0 - 53.0 %    MCV 91 78 - 100 fl    MCH 29.7 26.5 - 33.0 pg    MCHC 32.7 31.5 - 36.5 g/dL    RDW 14.8 10.0 - 15.0 %    Platelet Count 185 150 - 450 10e9/L    Diff Method Automated Method     % Neutrophils 69.0 %    % Lymphocytes 19.5 %    % Monocytes 9.3 %    % Eosinophils 1.2 %    % Basophils 0.8 %    % Immature Granulocytes 0.2 %    Nucleated RBCs 0 0 /100    Absolute Neutrophil 4.5 1.6 - 8.3 10e9/L    Absolute Lymphocytes 1.3 0.8 - 5.3 10e9/L    Absolute Monocytes 0.6 0.0 - 1.3 10e9/L    Absolute Eosinophils 0.1 0.0 - 0.7 10e9/L    Absolute Basophils 0.1 0.0 - 0.2 10e9/L    Abs Immature Granulocytes 0.0 0 - 0.4 10e9/L    Absolute Nucleated RBC 0.0    Comprehensive metabolic panel     Status: Abnormal    Collection Time: 06/23/18  5:26 PM   Result Value Ref Range    Sodium 137 133 - 144 mmol/L    Potassium 3.2 (L) 3.4 - 5.3 mmol/L    Chloride 101 94 - 109 mmol/L    Carbon Dioxide 26 20 - 32 mmol/L    Anion Gap 11 3 - 14 mmol/L    Glucose 99 70 - 99 mg/dL    Urea Nitrogen 9 7 - 30 mg/dL    Creatinine 0.61 (L) 0.66 - 1.25 mg/dL    GFR Estimate >90 >60 mL/min/1.7m2    GFR Estimate If Black >90 >60 mL/min/1.7m2    Calcium 7.9 (L) 8.5 - 10.1 mg/dL    Bilirubin Total 1.0 0.2 - 1.3 mg/dL    Albumin 2.7 (L) 3.4 - 5.0 g/dL    Protein Total 6.5 (L) 6.8 - 8.8 g/dL    Alkaline Phosphatase 159 (H) 40 - 150 U/L    ALT 22 0 - 70 U/L    AST 54 (H) 0 - 45 U/L   Magnesium     Status: Abnormal    Collection Time: 06/23/18  5:26 PM   Result Value Ref Range    Magnesium 1.5 (L) 1.6 - 2.3 mg/dL   UA with Microscopic     Status: Abnormal    Collection Time: 06/23/18  5:26 PM   Result Value Ref Range    Color Urine Orange     Appearance Urine Slightly Cloudy     Glucose Urine Negative NEG^Negative mg/dL    Bilirubin Urine Small (A) NEG^Negative    Ketones Urine 10 (A) NEG^Negative mg/dL     Specific Gravity Urine 1.024 1.003 - 1.035    Blood Urine Negative NEG^Negative    pH Urine 6.0 5.0 - 7.0 pH    Protein Albumin Urine 30 (A) NEG^Negative mg/dL    Urobilinogen mg/dL 2.0 0.0 - 2.0 mg/dL    Nitrite Urine Negative NEG^Negative    Leukocyte Esterase Urine Large (A) NEG^Negative    Source Midstream Urine     WBC Urine 49 (H) 0 - 5 /HPF    RBC Urine 3 (H) 0 - 2 /HPF    Transitional Epi 1 0 - 1 /HPF    Mucous Urine Present (A) NEG^Negative /LPF   Alcohol ethyl     Status: None    Collection Time: 06/23/18  5:26 PM   Result Value Ref Range    Ethanol g/dL <0.01 <0.01 g/dL   INR     Status: Abnormal    Collection Time: 06/23/18  5:26 PM   Result Value Ref Range    INR 1.24 (H) 0.86 - 1.14           Labs Ordered and Resulted from Time of ED Arrival Up to the Time of Departure from the ED   COMPREHENSIVE METABOLIC PANEL - Abnormal; Notable for the following:        Result Value    Potassium 3.2 (*)     Creatinine 0.61 (*)     Calcium 7.9 (*)     Albumin 2.7 (*)     Protein Total 6.5 (*)     Alkaline Phosphatase 159 (*)     AST 54 (*)     All other components within normal limits   MAGNESIUM - Abnormal; Notable for the following:     Magnesium 1.5 (*)     All other components within normal limits   ROUTINE UA WITH MICROSCOPIC - Abnormal; Notable for the following:     Bilirubin Urine Small (*)     Ketones Urine 10 (*)     Protein Albumin Urine 30 (*)     Leukocyte Esterase Urine Large (*)     WBC Urine 49 (*)     RBC Urine 3 (*)     Mucous Urine Present (*)     All other components within normal limits   INR - Abnormal; Notable for the following:     INR 1.24 (*)     All other components within normal limits   CBC WITH PLATELETS DIFFERENTIAL   ALCOHOL ETHYL            Assessments & Plan (with Medical Decision Making)\  ESLD with ascites, just informed for the first time, stressed importance of good follow up with GI, EKG and trop neg, POCUS noted to have large ascites, will start aldactone in addition to lasix,  follow up with her PMD and GI.  Fungal infection of skin, start lotrimn and stop hydrocortison.       I have reviewed the nursing notes.    I have reviewed the findings, diagnosis, plan and need for follow up with the patient.    Discharge Medication List as of 6/23/2018  7:07 PM      START taking these medications    Details   clotrimazole (LOTRIMIN) 1 % cream Apply topically 2 times daily for 15 daysDisp-45 g, R-0Local Print      spironolactone (ALDACTONE) 25 MG tablet Take 1 tablet (25 mg) by mouth 2 times daily, Disp-60 tablet, R-0, Local Print             Final diagnoses:   Alcoholic cirrhosis of liver with ascites (H)   Yeast infection of the skin     I, Adonay Hendricks, am serving as a trained medical scribe to document services personally performed by Anusha Wall MD, based on the provider's statements to me.   IAnusha MD, was physically present and have reviewed and verified the accuracy of this note documented by Adonay Hendricks.    6/23/2018   [facility], [place], EMERGENCY DEPARTMENT     Anusha Wall MD  06/23/18 4119

## 2018-06-23 NOTE — ED TRIAGE NOTES
[place] erlin [place] [facility]  [place]charged 5/28    Seen at [facility] [place] for abdominal distention  C/o increasing pain and nausea  Normal bm today

## 2018-06-23 NOTE — ED AVS SNAPSHOT
[facility], Emergency Department    [address] [address]    Phone:  [phone]                                       Mario Lamar II   MRN: [id number]    Department:  [facility], Emergency Department   Date of Visit:  6/23/2018           Patient Information     Date Of Birth          1965        Your diagnoses for this visit were:     Alcoholic cirrhosis of liver with ascites (H)     Yeast infection of the skin        You were seen by Anusha aWll MD.        Discharge Instructions         Ascites    Ascites (pronounced [id number]) is fluid collecting in the abdomen (stomach area). Symptoms include swelling of the abdomen and a feeling of pressure. Shortness of breath may also occur. In severe cases, the feet, ankles and legs may also swell.   There are many causes of ascites. The most common are related to the liver. They include:    Cirrhosis of the liver, which may be due to hepatitis B, hepatitis C, long-term alcohol abuse, nonalcoholic steatohepatitis ([place]) and others    Diseases such as heart failure, kidney failure, pancreatitis, or cancer  To treat the condition, a low-salt diet may be recommended. Medicines that help fluid leave the body (diuretics) may be prescribed. In some cases, a procedure is done to drain the abdomen of fluid. This is called paracentesis. Unless the underlying cause is treated, the fluid is likely to return.  If liver damage is due to alcohol, stopping all alcohol will help slow the progress of the disease. If liver damage is from hepatitis B or C, treatments may be given to fight the virus. If liver damage becomes life threatening, a liver transplant may be needed.  Home care    Certain medicines can worsen liver damage. Talk to your healthcare provider or pharmacist about any medicines you currently take. Ask your healthcare provider or pharmacist before taking any new medicines. Also ask before taking herbs, vitamins, or minerals.  Certain ones affect the liver.    Do not taking acetaminophen or ibuprofen without taking to your healthcare provider first. Both can affect your liver.     Stop all alcohol use. If you abuse alcohol, talk to your healthcare provider about getting help and support to stop.     If you use IV drugs, seek help to stop. Never share needles or other equipment.      If you have cirrhosis from diabetes, obesity, or metabolic syndrome (associated with non-alcoholic steatohepatitis), treatment of the underlying condition is critical. So is exercise and weight loss.  Follow-up care  Follow up with your healthcare provider as advised. If a culture was done on the ascitic fluid, or imaging, or other labs were done, call as directed for the results. Depending on the results, your treatment may change.  The following sources can tell you more about ascites and help you find support.    American [employer] Foundation [phone] www.liverfoundation.org    Hepatitis Foundation International www.hepfi.org    Alcoholics Anonymous www.aa.org    National [place] on Alcoholism and Drug Dependence 124-072-3081 www.ncadd.org  When to seek medical advice  Call your healthcare provider right away if you have any of the following:    Sudden weight gain with increased size of your abdomen or leg swelling    Increasing jaundice (yellowing of skin or eyes)    Excess bleeding from cuts or injuries    Blood in vomit or stool (black or red color)    Trouble breathing    Increasing abdominal pain    Confusion    Fever of 100.4 F (38 C) or higher, or as directed by your healthcare provider  Date Last Reviewed: 6/1/2017 2000-2017 The [employer]. [address] [place] [address]. All rights reserved. This information is not intended as a substitute for professional medical care. Always follow your healthcare professional's instructions.          Discharge Instructions for Cirrhosis of the Liver  You have been diagnosed with  cirrhosis of the liver. This is a long-term (chronic) problem. It occurs when liver tissue is destroyed and replaced by scar tissue. Causes of cirrhosis include:    Infection such as viral hepatitis    Chronic alcoholism    The body s immune system attacks healthy cells (autoimmune disorders)    Obesity    Medicine side effects    Genetic diseases  Sometimes the exact cause is unknown. You may not have any symptoms at first. Or your symptoms may be mild. But they usually get worse. Cirrhosis is likely to occur if you have a history of long-term alcohol abuse. Cirrhosis can t be cured. But it can be treated.   Home care  Alcohol    People with liver disease should not drink alcohol. If you stop drinking, you may feel better and live longer.    If you are a chronic alcohol user, you will have withdrawal symptoms. Talk with your healthcare provider for more information.      If alcohol is a problem, ask your provider about medicine that can help you quit drinking.      Find a local Alcoholics Anonymous support group online at www.aa.org.  Diet    Ask your provider what kind of diet you should follow. You may be asked to limit or not eat certain foods. Do not limit your protein intake.    Weigh yourself daily and keep a weight log. If you have a sudden change in weight, call your provider.    Cut back on salt:  ? Limit canned, dried, packaged, and fast foods.  ? Don t add salt to your food at the table.  ? Season foods with herbs instead of salt when you cook.  Medicines, supplements, and vaccines    Take your medicines exactly as directed.    Talk to your provider before taking vitamins, over-the-counter medicines, or herbal supplements. Many herbal supplements may be poisonous (toxic) to the liver.    Avoid aspirin and other blood-thinning medicines.    Discuss vitamin supplements and deficiencies with your provider.    Ask your provider about getting vaccinations for viruses that can cause liver diseases.  Follow-up  care  Follow up with your healthcare provider, or as advised. You will likely have the following tests:    Lab tests    Blood tests for liver cancer    Ultrasound of your liver every 6 months    Endoscopy to check for swollen veins (varices) in your digestive tract  When to call your provider  Call your healthcare provider right away if you have any of the following:    Fever of 100.4 F (38.0 C) or higher    Extreme tiredness (fatigue), weakness, or lack of appetite    Vomiting (with or without blood)    Yellowing of your skin or eyes (jaundice)    Itching    Swelling in your belly or legs    Black or tarry stools    Skin that bruises easily    Confusion or trouble thinking clearly   Date Last Reviewed: 8/1/2016 2000-2017 The [employer]. [address] [place] [address], [place] [id number]. All rights reserved. This information is not intended as a substitute for professional medical care. Always follow your healthcare professional's instructions.    Please make an appointment to follow up with Your Primary Care Provider in 3-7 days even if entirely better.       Discharge References/Attachments     FUNGAL SKIN INFECTION ([place]) (ENGLISH)      24 Hour Appointment Hotline       To make an appointment at any [facility], call [phone] ([phone]). If you don't have a family doctor or clinic, we will help you find one. [facility] are conveniently located to serve the needs of you and your family.             Review of your medicines      START taking        Dose / Directions Last dose taken    clotrimazole 1 % cream   Commonly known as:  LOTRIMIN   Quantity:  45 g        Apply topically 2 times daily for 15 days   Refills:  0        spironolactone 25 MG tablet   Commonly known as:  ALDACTONE   Dose:  25 mg   Quantity:  60 tablet        Take 1 tablet (25 mg) by mouth 2 times daily   Refills:  0          Our records show that you are taking the medicines listed below. If these are incorrect,  "please call your family doctor or clinic.        Dose / Directions Last dose taken    PROZAC PO        Refills:  0          STOP taking        Dose Reason for stopping Comments    hydrocortisone 1 % ointment                      Prescriptions were sent or printed at these locations (2 Prescriptions)                   Other Prescriptions                Printed at Department/Unit printer (2 of 2)         clotrimazole (LOTRIMIN) 1 % cream               spironolactone (ALDACTONE) 25 MG tablet                Procedures and tests performed during your visit     Alcohol ethyl    CBC with platelets differential    Comprehensive metabolic panel    INR    Magnesium    POC US ABDOMEN LIMITED    UA with Microscopic      Orders Needing Specimen Collection     None      Pending Results     Date and Time Order Name Status Description    6/23/2018 1715 POC US ABDOMEN LIMITED In process             Pending Culture Results     No orders found from 6/21/2018 to 6/24/2018.            Pending Results Instructions     If you had any lab results that were not finalized at the time of your Discharge, you can call the ED Lab Result RN at [phone]. You will be contacted by this team for any positive Lab results or changes in treatment. The nurses are available 7 days a week from 10A to 6:30P.  You can leave a message 24 hours per day and they will return your call.        Thank you for choosing [facility]       Thank you for choosing [facility] for your care. Our goal is always to provide you with excellent care. Hearing back from our patients is one way we can continue to improve our services. Please take a few minutes to complete the written survey that you may receive in the mail after you visit with us. Thank you!        [employer] Information     [employer] lets you send messages to your doctor, view your test results, renew your prescriptions, schedule appointments and more. To sign up, go to [email or web address] . Click on \"Log in\" on " "the left side of the screen, which will take you to the Welcome page. Then click on \"Sign up Now\" on the right side of the page.     You will be asked to enter the access code listed below, as well as some personal information. Please follow the directions to create your username and password.     Your access code is: [email or web address]  Expires: 2018  8:46 AM     Your access code will  in 90 days. If you need help or a new code, please call your [facility] clinic or [phone].        Care EveryWhere ID     This is your Care EveryWhere ID. This could be used by other organizations to access your [facility] medical records  [id number]        Equal Access to Services     DEANN ROSENBAUM : Geri Bender, marko galvan, arelis barrios, quentin luke. So [facility] [phone].    ATENCIÓN: Si habla español, tiene a lo disposición servicios gratuitos de asistencia lingüística. Llame al [phone].    We comply with applicable federal civil rights laws and [place] laws. We do not discriminate on the basis of race, color, national origin, age, disability, sex, sexual orientation, or gender identity.            After Visit Summary       This is your record. Keep this with you and show to your community pharmacist(s) and doctor(s) at your next visit.                  "

## 2018-06-24 NOTE — DISCHARGE INSTRUCTIONS
Ascites    Ascites (pronounced [id number]-[id number]) is fluid collecting in the abdomen (stomach area). Symptoms include swelling of the abdomen and a feeling of pressure. Shortness of breath may also occur. In severe cases, the feet, ankles and legs may also swell.   There are many causes of ascites. The most common are related to the liver. They include:    Cirrhosis of the liver, which may be due to hepatitis B, hepatitis C, long-term alcohol abuse, nonalcoholic steatohepatitis ([place]) and others    Diseases such as heart failure, kidney failure, pancreatitis, or cancer  To treat the condition, a low-salt diet may be recommended. Medicines that help fluid leave the body (diuretics) may be prescribed. In some cases, a procedure is done to drain the abdomen of fluid. This is called paracentesis. Unless the underlying cause is treated, the fluid is likely to return.  If liver damage is due to alcohol, stopping all alcohol will help slow the progress of the disease. If liver damage is from hepatitis B or C, treatments may be given to fight the virus. If liver damage becomes life threatening, a liver transplant may be needed.  Home care    Certain medicines can worsen liver damage. Talk to your healthcare provider or pharmacist about any medicines you currently take. Ask your healthcare provider or pharmacist before taking any new medicines. Also ask before taking herbs, vitamins, or minerals. Certain ones affect the liver.    Do not taking acetaminophen or ibuprofen without taking to your healthcare provider first. Both can affect your liver.     Stop all alcohol use. If you abuse alcohol, talk to your healthcare provider about getting help and support to stop.     If you use IV drugs, seek help to stop. Never share needles or other equipment.      If you have cirrhosis from diabetes, obesity, or metabolic syndrome (associated with non-alcoholic steatohepatitis), treatment of the underlying condition is critical. So is  exercise and weight loss.  Follow-up care  Follow up with your healthcare provider as advised. If a culture was done on the ascitic fluid, or imaging, or other labs were done, call as directed for the results. Depending on the results, your treatment may change.  The following sources can tell you more about ascites and help you find support.    [employer] 728-503-0392 www.liverfoundation.org    Hepatitis Foundation International www.hepfi.org    Alcoholics Anonymous www.aa.org    National [place] on Alcoholism and Drug Dependence 090-030-5548 www.ncadd.org  When to seek medical advice  Call your healthcare provider right away if you have any of the following:    Sudden weight gain with increased size of your abdomen or leg swelling    Increasing jaundice (yellowing of skin or eyes)    Excess bleeding from cuts or injuries    Blood in vomit or stool (black or red color)    Trouble breathing    Increasing abdominal pain    Confusion    Fever of 100.4 F (38 C) or higher, or as directed by your healthcare provider  Date Last Reviewed: 6/1/2017 2000-2017 [employer]. [address] [place] [address]. All rights reserved. This information is not intended as a substitute for professional medical care. Always follow your healthcare professional's instructions.          Discharge Instructions for Cirrhosis of the Liver  You have been diagnosed with cirrhosis of the liver. This is a long-term (chronic) problem. It occurs when liver tissue is destroyed and replaced by scar tissue. Causes of cirrhosis include:    Infection such as viral hepatitis    Chronic alcoholism    The body s immune system attacks healthy cells (autoimmune disorders)    Obesity    Medicine side effects    Genetic diseases  Sometimes the exact cause is unknown. You may not have any symptoms at first. Or your symptoms may be mild. But they usually get worse. Cirrhosis is likely to occur if you have a history of  long-term alcohol abuse. Cirrhosis can t be cured. But it can be treated.   Home care  Alcohol    People with liver disease should not drink alcohol. If you stop drinking, you may feel better and live longer.    If you are a chronic alcohol user, you will have withdrawal symptoms. Talk with your healthcare provider for more information.      If alcohol is a problem, ask your provider about medicine that can help you quit drinking.      Find a local Alcoholics Anonymous support group online at www.aa.org.  Diet    Ask your provider what kind of diet you should follow. You may be asked to limit or not eat certain foods. Do not limit your protein intake.    Weigh yourself daily and keep a weight log. If you have a sudden change in weight, call your provider.    Cut back on salt:  ? Limit canned, dried, packaged, and fast foods.  ? Don t add salt to your food at the table.  ? Season foods with herbs instead of salt when you cook.  Medicines, supplements, and vaccines    Take your medicines exactly as directed.    Talk to your provider before taking vitamins, over-the-counter medicines, or herbal supplements. Many herbal supplements may be poisonous (toxic) to the liver.    Avoid aspirin and other blood-thinning medicines.    Discuss vitamin supplements and deficiencies with your provider.    Ask your provider about getting vaccinations for viruses that can cause liver diseases.  Follow-up care  Follow up with your healthcare provider, or as advised. You will likely have the following tests:    Lab tests    Blood tests for liver cancer    Ultrasound of your liver every 6 months    Endoscopy to check for swollen veins (varices) in your digestive tract  When to call your provider  Call your healthcare provider right away if you have any of the following:    Fever of 100.4 F (38.0 C) or higher    Extreme tiredness (fatigue), weakness, or lack of appetite    Vomiting (with or without blood)    Yellowing of your skin or eyes  (jaundice)    Itching    Swelling in your belly or legs    Black or tarry stools    Skin that bruises easily    Confusion or trouble thinking clearly   Date Last Reviewed: 8/1/2016 2000-2017 [employer]. [address] [place] [address], [place] [address]. All rights reserved. This information is not intended as a substitute for professional medical care. Always follow your healthcare professional's instructions.    Please make an appointment to follow up with Your Primary Care Provider in 3-7 days even if entirely better.

## 2018-07-26 ENCOUNTER — HOSPITAL ENCOUNTER (EMERGENCY)
Facility: CLINIC | Age: 53
Discharge: HOME | End: 2018-07-26
Attending: EMERGENCY MEDICINE | Admitting: EMERGENCY MEDICINE
Payer: COMMERCIAL

## 2018-07-26 VITALS
TEMPERATURE: 96.3 F | HEART RATE: 83 BPM | DIASTOLIC BLOOD PRESSURE: 81 MMHG | OXYGEN SATURATION: 96 % | SYSTOLIC BLOOD PRESSURE: 117 MMHG | RESPIRATION RATE: 16 BRPM

## 2018-07-26 DIAGNOSIS — F10.220 ACUTE ALCOHOLIC INTOXICATION IN ALCOHOLISM WITHOUT COMPLICATION (H): ICD-10-CM

## 2018-07-26 LAB — ALCOHOL BREATH TEST: 0.26 (ref 0–0.01)

## 2018-07-26 PROCEDURE — 99283 EMERGENCY DEPT VISIT LOW MDM: CPT | Mod: Z6 | Performed by: EMERGENCY MEDICINE

## 2018-07-26 PROCEDURE — 99285 EMERGENCY DEPT VISIT HI MDM: CPT | Performed by: EMERGENCY MEDICINE

## 2018-07-26 PROCEDURE — 82075 ASSAY OF BREATH ETHANOL: CPT | Performed by: EMERGENCY MEDICINE

## 2018-07-26 NOTE — ED AVS SNAPSHOT
[facility], Emergency Department    [address]    [place] [address]    Phone:  [phone]    Fax:  [phone]                                       Mario Lamar II   MRN: [id number]    Department:  [facility], Emergency Department   Date of Visit:  7/26/2018           After Visit Summary Signature Page     I have received my discharge instructions, and my questions have been answered. I have discussed any challenges I see with this plan with the nurse or doctor.    ..........................................................................................................................................  Patient/Patient Representative Signature      ..........................................................................................................................................  Patient Representative Print Name and Relationship to Patient    ..................................................               ................................................  Date                                            Time    ..........................................................................................................................................  Reviewed by Signature/Title    ...................................................              ..............................................  Date                                                            Time

## 2018-07-26 NOTE — ED PROVIDER NOTES
History     Chief Complaint   Patient presents with     Suicidal     called 911, was in light rail [place], threatened to jump off the light rail train.      Alcohol Intoxication     HPI  Mario Lamar II is a 53 year old male who presents with suicidal ideation and altered mental status. Patient reports that he was drinking alcohol tonight when he saw his girlfriend with two other men. He states that this upset him and made him feel like he wanted to die. He had a plan of jumping in front of the light rail train. Patient reports that his last drink was prior to arrival. He notes worsening depressed moods in the past couple weeks due to not taking his medications. He states that he has not taken his medication in approximately two weeks. Patient denies wanting to die right now. He also denies illicit drug use.     I have reviewed the Medications, Allergies, Past Medical and Surgical History, and Social History in the Epic system.    Review of Systems  A 10-system review of systems was completed with pertinent positives and negatives noted in the HPI, otherwise negative.     Physical Exam   BP: 106/69  Pulse: 83  Heart Rate: 85  Temp: 96.3  F (35.7  C)  Resp: 16  SpO2: 96 %      Physical Exam  /81  Pulse 83  Temp 96.3  F (35.7  C) (Oral)  Resp 16  SpO2 96%  General: smells of EtOH, no acute distress  HENT: MMM, no oropharyngeal lesions. Atraumatic head.   Eyes: PERRL, normal sclerae, nystagmus present  Neck: non-tender, supple  Cardio: RRR, normal heart sounds, extremities well perfused  Resp: Normal work of breathing, clear breath sounds  Abdomen: no tenderness, non-distended, no rebound, no guarding  Neuro: alert, slow to respond. Slurred speech. Confused. Grossly normal strength and sensation.   MSK: no deformities.   Integumentary/Skin: no rash, normal color  Psych: SI recently but denying currently. Flat affect. Calm behavior.     ED Course   4:41 AM  The patient was seen and examined by  Ricki Cochran MD in Room HW01.   ED Course     Procedures        Critical Care time:  none       Labs Ordered and Resulted from Time of ED Arrival Up to the Time of Departure from the ED   ALCOHOL BREATH TEST POCT - Abnormal; Notable for the following:        Result Value    Alcohol Breath Test 0.261 (*)     All other components within normal limits            Assessments & Plan (with Medical Decision Making)   In the ED, the patient was afebrile and hemodynamically stable. History notable for altered mental status with heavy EtOH use and some suicidal ideation. Exam notable for smell of EtOH, slurred speech.     Breath EtOH 0.261. AMS likely due to EtOH, but other causes such as encephalopathy, intracranial hemorrhage, etc also considered. Patient monitored for mental status clearing.     Suicidal ideation prompted by seeing recent ex-girlfriend with other men. Patient had considered jumping in front of a train. He denies wanting to die now in the ED.     Mental status clearing appropriately but not yet clear. Patient signed out to oncoming provider with plan for further monitoring of mental status, re-evaluation of suicidal ideation when sober.       Clinical Impression:  Altered mental status  Alcohol intoxication delirium   Suicidal ideation       Ricki Cochran MD  Emergency Medicine       I have reviewed the nursing notes.    I have reviewed the findings, diagnosis, plan and need for follow up with the patient.    Discharge Medication List as of 7/26/2018 12:10 PM          Final diagnoses:   Acute alcoholic intoxication in alcoholism without complication (H)   IShelley, am serving as a trained medical scribe to document services personally performed by Ricki Cochran MD, based on the provider's statements to me.   Ricki SELF MD, was physically present and have reviewed and verified the accuracy of this note documented by Shelley Hsieh.    7/26/2018   [facility], [place], EMERGENCY  DEPARTMENT     Cochran, Ricki Santos MD  07/27/18 0702

## 2018-07-26 NOTE — ED NOTES
Bed: HW01  Expected date:   Expected time:   Means of arrival:   Comments:  Oliver 446 54 y/o M ETOH

## 2018-07-26 NOTE — ED AVS SNAPSHOT
[facility], Emergency Department    [address]    [place] [address]    Phone:  [phone]    Fax:  [phone]                                       Mario Lamar II   MRN: [id number]    Department:  [facility], Emergency Department   Date of Visit:  7/26/2018           Patient Information     Date Of Birth          1965        Your diagnoses for this visit were:     Acute alcoholic intoxication in alcoholism without complication (H)        You were seen by Ricki Cochran MD.        Discharge Instructions       You should get into detox and then an alcohol treatment program    24 Hour Appointment Hotline       To make an appointment at any [facility], call [phone] ([phone]). If you don't have a family doctor or clinic, we will help you find one. [facility] clinics are conveniently located to serve the needs of you and your family.             Review of your medicines      Our records show that you are taking the medicines listed below. If these are incorrect, please call your family doctor or clinic.        Dose / Directions Last dose taken    PROZAC PO        Refills:  0        spironolactone 25 MG tablet   Commonly known as:  ALDACTONE   Dose:  25 mg   Quantity:  60 tablet        Take 1 tablet (25 mg) by mouth 2 times daily   Refills:  0                Procedures and tests performed during your visit     Alcohol breath test POCT      Orders Needing Specimen Collection     Ordered          07/26/18 0426  Drug abuse screen 6 urine (tox) - STAT, [facility]: STAT, Needs to be Collected     Scheduled Task Status   07/26/18 0427 Collect Drug abuse screen 6 urine (tox) Open   Order Class:  PCU Collect                  Pending Results     No orders found from 7/24/2018 to 7/27/2018.            Pending Culture Results     No orders found from 7/24/2018 to 7/27/2018.            Pending Results Instructions     If you had any lab results that were not finalized at the  "time of your Discharge, you can call the ED Lab Result RN at [phone]. You will be contacted by this team for any positive Lab results or changes in treatment. The nurses are available 7 days a week from 10A to 6:30P.  You can leave a message 24 hours per day and they will return your call.        Thank you for choosing [facility]       Thank you for choosing [facility] for your care. Our goal is always to provide you with excellent care. Hearing back from our patients is one way we can continue to improve our services. Please take a few minutes to complete the written survey that you may receive in the mail after you visit with us. Thank you!        [employer] Information     [facility] lets you send messages to your doctor, view your test results, renew your prescriptions, schedule appointments and more. To sign up, go to [email or web address] . Click on \"Log in\" on the left side of the screen, which will take you to the Welcome page. Then click on \"Sign up Now\" on the right side of the page.     You will be asked to enter the access code listed below, as well as some personal information. Please follow the directions to create your username and password.     Your access code is: [email or web address]  Expires: 10/24/2018 11:59 AM     Your access code will  in 90 days. If you need help or a new code, please call your [facility] clinic or [phone].        Care EveryWhere ID     This is your Care EveryWhere ID. This could be used by other organizations to access your [facility] medical records  [id number]        Equal Access to Services     [facility] : Hadii lili kovacs Socorry, waaxda luqadaha, qaybta kaalmada sophie, quentin perez . So [facility] [phone].    ATENCIÓN: Si habla español, tiene a lo disposición servicios gratuitos de asistencia lingüística. Llame al [phone].    We comply with applicable federal civil rights laws and [place] laws. We do not discriminate on the " basis of race, color, national origin, age, disability, sex, sexual orientation, or gender identity.            After Visit Summary       This is your record. Keep this with you and show to your community pharmacist(s) and doctor(s) at your next visit.

## 2018-07-26 NOTE — ED NOTES
I have performed an in person assessment of the patient. Based on this assessment the patient no longer requires a one on one attendant at this point in time.    Mario Grayson MD  7:41 AM  July 26, 2018           Mario Grayson MD  07/26/18 0741

## 2018-07-26 NOTE — ED NOTES
Ate breakfast. Up walking to doorway and is only slightly unsteady. Apple juice given and ginger ale given. To discharge in about 15 minutes.

## 2018-10-23 ENCOUNTER — TRANSFERRED RECORDS (OUTPATIENT)
Dept: HEALTH INFORMATION MANAGEMENT | Facility: CLINIC | Age: 53
End: 2018-10-23

## 2018-10-31 ENCOUNTER — HOSPITAL ENCOUNTER (OUTPATIENT)
Dept: BEHAVIORAL HEALTH | Facility: CLINIC | Age: 53
Discharge: HOME | End: 2018-10-31
Attending: SOCIAL WORKER | Admitting: SOCIAL WORKER
Payer: COMMERCIAL

## 2018-10-31 VITALS
BODY MASS INDEX: 22.96 KG/M2 | DIASTOLIC BLOOD PRESSURE: 82 MMHG | WEIGHT: 155 LBS | HEIGHT: 69 IN | SYSTOLIC BLOOD PRESSURE: 122 MMHG | HEART RATE: 67 BPM

## 2018-10-31 PROCEDURE — H0001 ALCOHOL AND/OR DRUG ASSESS: HCPCS

## 2018-10-31 ASSESSMENT — ANXIETY QUESTIONNAIRES
3. WORRYING TOO MUCH ABOUT DIFFERENT THINGS: MORE THAN HALF THE DAYS
7. FEELING AFRAID AS IF SOMETHING AWFUL MIGHT HAPPEN: MORE THAN HALF THE DAYS
GAD7 TOTAL SCORE: 14
IF YOU CHECKED OFF ANY PROBLEMS ON THIS QUESTIONNAIRE, HOW DIFFICULT HAVE THESE PROBLEMS MADE IT FOR YOU TO DO YOUR WORK, TAKE CARE OF THINGS AT HOME, OR GET ALONG WITH OTHER PEOPLE: SOMEWHAT DIFFICULT
4. TROUBLE RELAXING: MORE THAN HALF THE DAYS
5. BEING SO RESTLESS THAT IT IS HARD TO SIT STILL: MORE THAN HALF THE DAYS
2. NOT BEING ABLE TO STOP OR CONTROL WORRYING: MORE THAN HALF THE DAYS
6. BECOMING EASILY ANNOYED OR IRRITABLE: MORE THAN HALF THE DAYS
1. FEELING NERVOUS, ANXIOUS, OR ON EDGE: MORE THAN HALF THE DAYS

## 2018-10-31 ASSESSMENT — PATIENT HEALTH QUESTIONNAIRE - PHQ9: SUM OF ALL RESPONSES TO PHQ QUESTIONS 1-9: 19

## 2018-10-31 ASSESSMENT — PAIN SCALES - GENERAL: PAINLEVEL: MODERATE PAIN (4)
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
## 2018-10-31 NOTE — PROGRESS NOTES
Patient Safety Plan Template    Name:   Mario Lamar II YOB: 1965 Age:  53 year old MR Number:  [id number]   Step 1: Warning signs (Thoughts, images, mood, situation, behavior) that a crisis may be developin. When drinking and not taking his medications.     2. When he isolated himself.     3. When he thinks of his wife, he still misses her.      Step 2: Internal coping strategies - Things I can do to take my mind off of my problems without contacting another person (relaxation technique, physical activity):     1. Thinking of his kids.      2. He will call his dtr or calling his auntie     3. Stay busy     Step 3: People and social settings that provide distraction:     1. Name: Radha Lamar auntie   Phone: [phone]   2. Name: Harry Canseco dtr   Phone: [phone]   3. [facility]   4. Place: [phone]     The one thing that is most important to me and worth living for is: himself and his kids      Step 4: People whom I can ask for help:     1. Name: Kevin Sapp son   Phone: [phone]     2. Name: : Radha Lamar auntie   Phone: [phone]     3. Name: Harry Canseco dtr   Phone: [phone]     Step 5: Professionals or agencies I can contact during a crisis:     1. Clinician Name: ISIDORO   Phone: ISIDORO   Clinician Pager or Emergency Contact #: ISIDORO     2. Clinician Name: Lorraine Gentile    Phone: [phone]     Clinician Pager or Emergency Contact #: Lorraine Gentile  [phone]     3. Local Urgent Care Services: [facility]   [phone]    Urgent Care Services Address: [facility]    Urgent Care Services Phone: [facility]     4. Suicide Prevention Lifeline Phone: [phone] ([phone])     Step 6: Making the environment safe:     1. No drinking is allowed in his house     2. Call  daily     Safety Plan Template 2008 Marbella Shea and Flo Galo is reprinted with the express permission of the authors.  No  portion of the Safety Plan Template may be reproduced without the express, written permission.  You can contact the authors at [email or web address] or [email or web address].

## 2018-11-01 ASSESSMENT — ANXIETY QUESTIONNAIRES: GAD7 TOTAL SCORE: 14
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
## 2018-11-26 ENCOUNTER — TELEPHONE (OUTPATIENT)
Dept: BEHAVIORAL HEALTH | Facility: CLINIC | Age: 53
End: 2018-11-26

## 2018-11-26 NOTE — TELEPHONE ENCOUNTER
Writer attempted to call Pt via phone number listed. It is not an active phone number (beep only). Unable to leave a message. Did not attend the assessment today.

## 2021-10-10 ENCOUNTER — APPOINTMENT (OUTPATIENT)
Dept: CT IMAGING | Facility: CLINIC | Age: 56
End: 2021-10-10
Attending: EMERGENCY MEDICINE
Payer: COMMERCIAL

## 2021-10-10 ENCOUNTER — HOSPITAL ENCOUNTER (INPATIENT)
Facility: CLINIC | Age: 56
LOS: 4 days | Discharge: HOME | End: 2021-10-14
Attending: EMERGENCY MEDICINE | Admitting: SURGERY
Payer: COMMERCIAL

## 2021-10-10 DIAGNOSIS — S22.050A: ICD-10-CM
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
## 2021-10-10 DIAGNOSIS — F10.20 ALCOHOL USE DISORDER, SEVERE, DEPENDENCE (H): ICD-10-CM

## 2021-10-10 DIAGNOSIS — Z20.822 CONTACT WITH AND (SUSPECTED) EXPOSURE TO COVID-19: ICD-10-CM

## 2021-10-10 DIAGNOSIS — S22.070A: ICD-10-CM

## 2021-10-10 DIAGNOSIS — E87.6 HYPOKALEMIA: ICD-10-CM

## 2021-10-10 DIAGNOSIS — R56.9 SEIZURE (H): Primary | ICD-10-CM

## 2021-10-10 DIAGNOSIS — F10.930 ALCOHOL WITHDRAWAL SYNDROME WITHOUT COMPLICATION (H): ICD-10-CM

## 2021-10-10 DIAGNOSIS — L40.9 PSORIASIS: ICD-10-CM

## 2021-10-10 DIAGNOSIS — G40.909 SEIZURE DISORDER (H): ICD-10-CM

## 2021-10-10 DIAGNOSIS — I48.91 ATRIAL FIBRILLATION, UNSPECIFIED TYPE (H): ICD-10-CM

## 2021-10-10 DIAGNOSIS — S22.060A: ICD-10-CM

## 2021-10-10 DIAGNOSIS — S32.010A: ICD-10-CM

## 2021-10-10 LAB
ALBUMIN SERPL-MCNC: 3.7 G/DL (ref 3.4–5)
ALP SERPL-CCNC: 350 U/L (ref 40–150)
ALT SERPL W P-5'-P-CCNC: 79 U/L (ref 0–70)
ANION GAP SERPL CALCULATED.3IONS-SCNC: 17 MMOL/L (ref 3–14)
APAP SERPL-MCNC: <2 MG/L (ref 10–30)
AST SERPL W P-5'-P-CCNC: 188 U/L (ref 0–45)
BASOPHILS # BLD AUTO: 0.1 10E3/UL (ref 0–0.2)
BASOPHILS NFR BLD AUTO: 1 %
BILIRUB SERPL-MCNC: 1.8 MG/DL (ref 0.2–1.3)
BUN SERPL-MCNC: 8 MG/DL (ref 7–30)
CA-I BLD-MCNC: 4.8 MG/DL (ref 4.4–5.2)
CALCIUM SERPL-MCNC: 8.8 MG/DL (ref 8.5–10.1)
CHLORIDE BLD-SCNC: 104 MMOL/L (ref 94–109)
CO2 SERPL-SCNC: 16 MMOL/L (ref 20–32)
CPB POCT: NO
CREAT BLD-MCNC: 0.6 MG/DL (ref 0.7–1.3)
CREAT SERPL-MCNC: 0.57 MG/DL (ref 0.66–1.25)
EOSINOPHIL # BLD AUTO: 0.1 10E3/UL (ref 0–0.7)
EOSINOPHIL NFR BLD AUTO: 1 %
ERYTHROCYTE [DISTWIDTH] IN BLOOD BY AUTOMATED COUNT: 14.1 % (ref 10–15)
ETHANOL SERPL-MCNC: <0.01 G/DL
GFR SERPL CREATININE-BSD FRML MDRD: >60 ML/MIN/1.73M2
GFR SERPL CREATININE-BSD FRML MDRD: >90 ML/MIN/1.73M2
GLUCOSE BLD-MCNC: 112 MG/DL (ref 70–99)
GLUCOSE BLD-MCNC: 123 MG/DL (ref 70–99)
GLUCOSE BLDC GLUCOMTR-MCNC: 147 MG/DL (ref 70–99)
HCO3 BLDV-SCNC: 16 MMOL/L (ref 21–28)
HCO3 BLDV-SCNC: 17 MMOL/L (ref 21–28)
HCT VFR BLD AUTO: 47.2 % (ref 40–53)
HCT VFR BLD CALC: 47 % (ref 40–53)
HGB BLD-MCNC: 15.5 G/DL (ref 13.3–17.7)
HGB BLD-MCNC: 16 G/DL (ref 13.3–17.7)
HOLD SPECIMEN: NORMAL
IMM GRANULOCYTES # BLD: 0.1 10E3/UL
IMM GRANULOCYTES NFR BLD: 1 %
INR PPP: 1.25 (ref 0.85–1.15)
LACTATE BLD-SCNC: 12.1 MMOL/L
LIPASE SERPL-CCNC: 149 U/L (ref 73–393)
LYMPHOCYTES # BLD AUTO: 1.9 10E3/UL (ref 0.8–5.3)
LYMPHOCYTES NFR BLD AUTO: 17 %
MAGNESIUM SERPL-MCNC: 1.8 MG/DL (ref 1.6–2.3)
MCH RBC QN AUTO: 32.8 PG (ref 26.5–33)
MCHC RBC AUTO-ENTMCNC: 32.8 G/DL (ref 31.5–36.5)
MCV RBC AUTO: 100 FL (ref 78–100)
MONOCYTES # BLD AUTO: 1 10E3/UL (ref 0–1.3)
MONOCYTES NFR BLD AUTO: 9 %
NEUTROPHILS # BLD AUTO: 8 10E3/UL (ref 1.6–8.3)
NEUTROPHILS NFR BLD AUTO: 71 %
NRBC # BLD AUTO: 0 10E3/UL
NRBC BLD AUTO-RTO: 0 /100
PCO2 BLDV: 40 MM HG (ref 40–50)
PCO2 BLDV: 42 MM HG (ref 40–50)
PH BLDV: 7.21 [PH] (ref 7.32–7.43)
PH BLDV: 7.22 [PH] (ref 7.32–7.43)
PLATELET # BLD AUTO: 159 10E3/UL (ref 150–450)
PO2 BLDV: 70 MM HG (ref 25–47)
PO2 BLDV: 72 MM HG (ref 25–47)
POTASSIUM BLD-SCNC: 2.9 MMOL/L (ref 3.4–5.3)
POTASSIUM BLD-SCNC: 2.9 MMOL/L (ref 3.4–5.3)
PROT SERPL-MCNC: 9.8 G/DL (ref 6.8–8.8)
RBC # BLD AUTO: 4.72 10E6/UL (ref 4.4–5.9)
SALICYLATES SERPL-MCNC: <2 MG/DL
SAO2 % BLDV: 90 % (ref 94–100)
SAO2 % BLDV: 91 % (ref 94–100)
SODIUM BLD-SCNC: 140 MMOL/L (ref 133–144)
SODIUM SERPL-SCNC: 137 MMOL/L (ref 133–144)
T4 FREE SERPL-MCNC: 1.06 NG/DL (ref 0.76–1.46)
TROPONIN I SERPL-MCNC: <0.015 UG/L (ref 0–0.04)
TSH SERPL DL<=0.005 MIU/L-ACNC: 5.04 MU/L (ref 0.4–4)
WBC # BLD AUTO: 11.1 10E3/UL (ref 4–11)

## 2021-10-10 PROCEDURE — 250N000011 HC RX IP 250 OP 636

## 2021-10-10 PROCEDURE — 84484 ASSAY OF TROPONIN QUANT: CPT | Performed by: EMERGENCY MEDICINE

## 2021-10-10 PROCEDURE — 72128 CT CHEST SPINE W/O DYE: CPT

## 2021-10-10 PROCEDURE — 82077 ASSAY SPEC XCP UR&BREATH IA: CPT | Performed by: EMERGENCY MEDICINE

## 2021-10-10 PROCEDURE — 72131 CT LUMBAR SPINE W/O DYE: CPT | Mod: 26 | Performed by: RADIOLOGY

## 2021-10-10 PROCEDURE — 74177 CT ABD & PELVIS W/CONTRAST: CPT | Mod: 26 | Performed by: RADIOLOGY

## 2021-10-10 PROCEDURE — 258N000003 HC RX IP 258 OP 636: Performed by: EMERGENCY MEDICINE

## 2021-10-10 PROCEDURE — 70450 CT HEAD/BRAIN W/O DYE: CPT

## 2021-10-10 PROCEDURE — 83735 ASSAY OF MAGNESIUM: CPT | Performed by: EMERGENCY MEDICINE

## 2021-10-10 PROCEDURE — 96365 THER/PROPH/DIAG IV INF INIT: CPT | Performed by: EMERGENCY MEDICINE

## 2021-10-10 PROCEDURE — 84439 ASSAY OF FREE THYROXINE: CPT | Performed by: EMERGENCY MEDICINE

## 2021-10-10 PROCEDURE — 80179 DRUG ASSAY SALICYLATE: CPT | Performed by: EMERGENCY MEDICINE

## 2021-10-10 PROCEDURE — C9803 HOPD COVID-19 SPEC COLLECT: HCPCS | Performed by: EMERGENCY MEDICINE

## 2021-10-10 PROCEDURE — 82803 BLOOD GASES ANY COMBINATION: CPT

## 2021-10-10 PROCEDURE — 84443 ASSAY THYROID STIM HORMONE: CPT | Performed by: EMERGENCY MEDICINE

## 2021-10-10 PROCEDURE — 72131 CT LUMBAR SPINE W/O DYE: CPT

## 2021-10-10 PROCEDURE — U0005 INFEC AGEN DETEC AMPLI PROBE: HCPCS | Performed by: EMERGENCY MEDICINE

## 2021-10-10 PROCEDURE — 99222 1ST HOSP IP/OBS MODERATE 55: CPT | Mod: GC | Performed by: STUDENT IN AN ORGANIZED HEALTH CARE EDUCATION/TRAINING PROGRAM

## 2021-10-10 PROCEDURE — 70450 CT HEAD/BRAIN W/O DYE: CPT | Mod: 26 | Performed by: RADIOLOGY

## 2021-10-10 PROCEDURE — 82040 ASSAY OF SERUM ALBUMIN: CPT | Performed by: EMERGENCY MEDICINE

## 2021-10-10 PROCEDURE — 99291 CRITICAL CARE FIRST HOUR: CPT | Performed by: EMERGENCY MEDICINE

## 2021-10-10 PROCEDURE — 99285 EMERGENCY DEPT VISIT HI MDM: CPT | Mod: 25 | Performed by: EMERGENCY MEDICINE

## 2021-10-10 PROCEDURE — 85025 COMPLETE CBC W/AUTO DIFF WBC: CPT | Performed by: EMERGENCY MEDICINE

## 2021-10-10 PROCEDURE — 250N000011 HC RX IP 250 OP 636: Performed by: EMERGENCY MEDICINE

## 2021-10-10 PROCEDURE — 83690 ASSAY OF LIPASE: CPT | Performed by: EMERGENCY MEDICINE

## 2021-10-10 PROCEDURE — 36415 COLL VENOUS BLD VENIPUNCTURE: CPT | Performed by: EMERGENCY MEDICINE

## 2021-10-10 PROCEDURE — 82565 ASSAY OF CREATININE: CPT

## 2021-10-10 PROCEDURE — 71260 CT THORAX DX C+: CPT

## 2021-10-10 PROCEDURE — 80143 DRUG ASSAY ACETAMINOPHEN: CPT | Performed by: EMERGENCY MEDICINE

## 2021-10-10 PROCEDURE — 120N000003 HC R&B IMCU UMMC

## 2021-10-10 PROCEDURE — 85610 PROTHROMBIN TIME: CPT | Performed by: EMERGENCY MEDICINE

## 2021-10-10 PROCEDURE — 71260 CT THORAX DX C+: CPT | Mod: 26 | Performed by: RADIOLOGY

## 2021-10-10 PROCEDURE — 72125 CT NECK SPINE W/O DYE: CPT

## 2021-10-10 PROCEDURE — 72128 CT CHEST SPINE W/O DYE: CPT | Mod: 26 | Performed by: RADIOLOGY

## 2021-10-10 PROCEDURE — 72125 CT NECK SPINE W/O DYE: CPT | Mod: 26 | Performed by: RADIOLOGY

## 2021-10-10 PROCEDURE — 93005 ELECTROCARDIOGRAM TRACING: CPT | Performed by: EMERGENCY MEDICINE

## 2021-10-10 RX ORDER — POTASSIUM CHLORIDE 7.45 MG/ML
10 INJECTION INTRAVENOUS
Status: DISCONTINUED | OUTPATIENT
Start: 2021-10-10 | End: 2021-10-11

## 2021-10-10 RX ORDER — IOPAMIDOL 755 MG/ML
100 INJECTION, SOLUTION INTRAVASCULAR ONCE
Status: COMPLETED | OUTPATIENT
Start: 2021-10-10 | End: 2021-10-10

## 2021-10-10 RX ORDER — THIAMINE HYDROCHLORIDE 100 MG/ML
100 INJECTION, SOLUTION INTRAMUSCULAR; INTRAVENOUS ONCE
Status: COMPLETED | OUTPATIENT
Start: 2021-10-10 | End: 2021-10-11

## 2021-10-10 RX ORDER — LORAZEPAM 2 MG/ML
INJECTION INTRAMUSCULAR
Status: COMPLETED
Start: 2021-10-10 | End: 2021-10-10

## 2021-10-10 RX ORDER — FOLIC ACID 5 MG/ML
1 INJECTION, SOLUTION INTRAMUSCULAR; INTRAVENOUS; SUBCUTANEOUS ONCE
Status: COMPLETED | OUTPATIENT
Start: 2021-10-10 | End: 2021-10-11

## 2021-10-10 RX ADMIN — SODIUM CHLORIDE, POTASSIUM CHLORIDE, SODIUM LACTATE AND CALCIUM CHLORIDE 500 ML: 600; 310; 30; 20 INJECTION, SOLUTION INTRAVENOUS at 23:06

## 2021-10-10 RX ADMIN — IOPAMIDOL 100 ML: 755 INJECTION, SOLUTION INTRAVENOUS at 22:39

## 2021-10-10 RX ADMIN — LORAZEPAM 1 MG: 2 INJECTION INTRAMUSCULAR; INTRAVENOUS at 22:56

## 2021-10-10 RX ADMIN — LEVETIRACETAM 2000 MG: 100 INJECTION, SOLUTION INTRAVENOUS at 23:25

## 2021-10-10 ASSESSMENT — MIFFLIN-ST. JEOR: SCORE: 1467.22

## 2021-10-11 ENCOUNTER — APPOINTMENT (OUTPATIENT)
Dept: NEUROLOGY | Facility: CLINIC | Age: 56
End: 2021-10-11
Attending: COUNSELOR
Payer: COMMERCIAL

## 2021-10-11 ENCOUNTER — APPOINTMENT (OUTPATIENT)
Dept: CARDIOLOGY | Facility: CLINIC | Age: 56
End: 2021-10-11
Payer: COMMERCIAL

## 2021-10-11 LAB
ALBUMIN SERPL-MCNC: 2.7 G/DL (ref 3.4–5)
ALBUMIN UR-MCNC: 30 MG/DL
ALP SERPL-CCNC: 250 U/L (ref 40–150)
ALT SERPL W P-5'-P-CCNC: 46 U/L (ref 0–70)
AMPHETAMINES UR QL SCN: NORMAL
ANION GAP SERPL CALCULATED.3IONS-SCNC: 7 MMOL/L (ref 3–14)
APPEARANCE UR: CLEAR
AST SERPL W P-5'-P-CCNC: 106 U/L (ref 0–45)
ATRIAL RATE - MUSE: 288 BPM
BARBITURATES UR QL: NORMAL
BASE EXCESS BLDV CALC-SCNC: 1.2 MMOL/L (ref -7.7–1.9)
BASOPHILS # BLD AUTO: 0.1 10E3/UL (ref 0–0.2)
BASOPHILS NFR BLD AUTO: 1 %
BENZODIAZ UR QL: NORMAL
BILIRUB DIRECT SERPL-MCNC: 0.9 MG/DL (ref 0–0.2)
BILIRUB SERPL-MCNC: 1.9 MG/DL (ref 0.2–1.3)
BILIRUB UR QL STRIP: NEGATIVE
BUN SERPL-MCNC: 6 MG/DL (ref 7–30)
CALCIUM SERPL-MCNC: 8 MG/DL (ref 8.5–10.1)
CANNABINOIDS UR QL SCN: NORMAL
CHLORIDE BLD-SCNC: 108 MMOL/L (ref 94–109)
CO2 SERPL-SCNC: 24 MMOL/L (ref 20–32)
COCAINE UR QL: NORMAL
COLOR UR AUTO: YELLOW
CREAT BLD-MCNC: 0.6 MG/DL (ref 0.5–1.2)
CREAT SERPL-MCNC: 0.47 MG/DL (ref 0.66–1.25)
DIASTOLIC BLOOD PRESSURE - MUSE: NORMAL MMHG
EOSINOPHIL # BLD AUTO: 0 10E3/UL (ref 0–0.7)
EOSINOPHIL NFR BLD AUTO: 0 %
ERYTHROCYTE [DISTWIDTH] IN BLOOD BY AUTOMATED COUNT: 13.9 % (ref 10–15)
GFR SERPL CREATININE-BSD FRML MDRD: >90 ML/MIN/1.73M2
GLUCOSE BLD-MCNC: 84 MG/DL (ref 70–99)
GLUCOSE UR STRIP-MCNC: NEGATIVE MG/DL
HBV CORE AB SERPL QL IA: NONREACTIVE
HBV SURFACE AB SERPL IA-ACNC: 28.2 M[IU]/ML
HBV SURFACE AG SERPL QL IA: NONREACTIVE
HCO3 BLDV-SCNC: 25 MMOL/L (ref 21–28)
HCT VFR BLD AUTO: 36 % (ref 40–53)
HGB BLD-MCNC: 12.2 G/DL (ref 13.3–17.7)
HGB UR QL STRIP: NEGATIVE
IMM GRANULOCYTES # BLD: 0 10E3/UL
IMM GRANULOCYTES NFR BLD: 0 %
INR PPP: 1.33 (ref 0.85–1.15)
INTERPRETATION ECG - MUSE: NORMAL
KETONES UR STRIP-MCNC: NEGATIVE MG/DL
LACTATE SERPL-SCNC: 1 MMOL/L (ref 0.7–2)
LEUKOCYTE ESTERASE UR QL STRIP: NEGATIVE
LVEF ECHO: NORMAL
LYMPHOCYTES # BLD AUTO: 1.2 10E3/UL (ref 0.8–5.3)
LYMPHOCYTES NFR BLD AUTO: 14 %
MAGNESIUM SERPL-MCNC: 1.7 MG/DL (ref 1.6–2.3)
MCH RBC QN AUTO: 32.8 PG (ref 26.5–33)
MCHC RBC AUTO-ENTMCNC: 33.9 G/DL (ref 31.5–36.5)
MCV RBC AUTO: 97 FL (ref 78–100)
MONOCYTES # BLD AUTO: 1 10E3/UL (ref 0–1.3)
MONOCYTES NFR BLD AUTO: 12 %
MUCOUS THREADS #/AREA URNS LPF: PRESENT /LPF
NEUTROPHILS # BLD AUTO: 6.1 10E3/UL (ref 1.6–8.3)
NEUTROPHILS NFR BLD AUTO: 73 %
NITRATE UR QL: NEGATIVE
NRBC # BLD AUTO: 0 10E3/UL
NRBC BLD AUTO-RTO: 0 /100
O2/TOTAL GAS SETTING VFR VENT: 21 %
OPIATES UR QL SCN: NORMAL
P AXIS - MUSE: NORMAL DEGREES
PCO2 BLDV: 36 MM HG (ref 40–50)
PH BLDV: 7.45 [PH] (ref 7.32–7.43)
PH UR STRIP: 6 [PH] (ref 5–7)
PHOSPHATE SERPL-MCNC: 2.6 MG/DL (ref 2.5–4.5)
PLATELET # BLD AUTO: 103 10E3/UL (ref 150–450)
PO2 BLDV: 54 MM HG (ref 25–47)
POTASSIUM BLD-SCNC: 3 MMOL/L (ref 3.4–5.3)
POTASSIUM BLD-SCNC: 3.1 MMOL/L (ref 3.4–5.3)
POTASSIUM BLD-SCNC: 3.4 MMOL/L (ref 3.4–5.3)
PR INTERVAL - MUSE: NORMAL MS
PROT SERPL-MCNC: 7.3 G/DL (ref 6.8–8.8)
QRS DURATION - MUSE: 82 MS
QT - MUSE: 320 MS
QTC - MUSE: 500 MS
R AXIS - MUSE: -26 DEGREES
RADIOLOGIST FLAGS: NORMAL
RBC # BLD AUTO: 3.72 10E6/UL (ref 4.4–5.9)
RBC URINE: 1 /HPF
SARS-COV-2 RNA RESP QL NAA+PROBE: NEGATIVE
SODIUM SERPL-SCNC: 139 MMOL/L (ref 133–144)
SP GR UR STRIP: 1.03 (ref 1–1.03)
SQUAMOUS EPITHELIAL: <1 /HPF
SYSTOLIC BLOOD PRESSURE - MUSE: NORMAL MMHG
T AXIS - MUSE: -79 DEGREES
UROBILINOGEN UR STRIP-MCNC: 2 MG/DL
VENTRICULAR RATE- MUSE: 147 BPM
WBC # BLD AUTO: 8.4 10E3/UL (ref 4–11)
WBC URINE: 2 /HPF

## 2021-10-11 PROCEDURE — 93010 ELECTROCARDIOGRAM REPORT: CPT | Performed by: INTERNAL MEDICINE

## 2021-10-11 PROCEDURE — 93306 TTE W/DOPPLER COMPLETE: CPT

## 2021-10-11 PROCEDURE — 87040 BLOOD CULTURE FOR BACTERIA: CPT | Performed by: EMERGENCY MEDICINE

## 2021-10-11 PROCEDURE — 87340 HEPATITIS B SURFACE AG IA: CPT | Performed by: STUDENT IN AN ORGANIZED HEALTH CARE EDUCATION/TRAINING PROGRAM

## 2021-10-11 PROCEDURE — 36415 COLL VENOUS BLD VENIPUNCTURE: CPT | Performed by: EMERGENCY MEDICINE

## 2021-10-11 PROCEDURE — L0464 TLSO 4MOD SACRO-SCAP PRE: HCPCS

## 2021-10-11 PROCEDURE — 250N000013 HC RX MED GY IP 250 OP 250 PS 637: Performed by: STUDENT IN AN ORGANIZED HEALTH CARE EDUCATION/TRAINING PROGRAM

## 2021-10-11 PROCEDURE — 82803 BLOOD GASES ANY COMBINATION: CPT | Performed by: STUDENT IN AN ORGANIZED HEALTH CARE EDUCATION/TRAINING PROGRAM

## 2021-10-11 PROCEDURE — 250N000013 HC RX MED GY IP 250 OP 250 PS 637: Performed by: COUNSELOR

## 2021-10-11 PROCEDURE — 96367 TX/PROPH/DG ADDL SEQ IV INF: CPT | Performed by: EMERGENCY MEDICINE

## 2021-10-11 PROCEDURE — 84132 ASSAY OF SERUM POTASSIUM: CPT | Performed by: STUDENT IN AN ORGANIZED HEALTH CARE EDUCATION/TRAINING PROGRAM

## 2021-10-11 PROCEDURE — 36415 COLL VENOUS BLD VENIPUNCTURE: CPT | Performed by: STUDENT IN AN ORGANIZED HEALTH CARE EDUCATION/TRAINING PROGRAM

## 2021-10-11 PROCEDURE — 250N000013 HC RX MED GY IP 250 OP 250 PS 637: Performed by: INTERNAL MEDICINE

## 2021-10-11 PROCEDURE — 95819 EEG AWAKE AND ASLEEP: CPT

## 2021-10-11 PROCEDURE — 99223 1ST HOSP IP/OBS HIGH 75: CPT | Mod: GC | Performed by: INTERNAL MEDICINE

## 2021-10-11 PROCEDURE — 250N000011 HC RX IP 250 OP 636: Performed by: STUDENT IN AN ORGANIZED HEALTH CARE EDUCATION/TRAINING PROGRAM

## 2021-10-11 PROCEDURE — 258N000003 HC RX IP 258 OP 636: Performed by: STUDENT IN AN ORGANIZED HEALTH CARE EDUCATION/TRAINING PROGRAM

## 2021-10-11 PROCEDURE — 250N000009 HC RX 250: Performed by: STUDENT IN AN ORGANIZED HEALTH CARE EDUCATION/TRAINING PROGRAM

## 2021-10-11 PROCEDURE — 250N000013 HC RX MED GY IP 250 OP 250 PS 637

## 2021-10-11 PROCEDURE — 86704 HEP B CORE ANTIBODY TOTAL: CPT | Performed by: STUDENT IN AN ORGANIZED HEALTH CARE EDUCATION/TRAINING PROGRAM

## 2021-10-11 PROCEDURE — 85610 PROTHROMBIN TIME: CPT | Performed by: STUDENT IN AN ORGANIZED HEALTH CARE EDUCATION/TRAINING PROGRAM

## 2021-10-11 PROCEDURE — 250N000009 HC RX 250: Performed by: EMERGENCY MEDICINE

## 2021-10-11 PROCEDURE — 96365 THER/PROPH/DIAG IV INF INIT: CPT | Mod: 59 | Performed by: EMERGENCY MEDICINE

## 2021-10-11 PROCEDURE — 84132 ASSAY OF SERUM POTASSIUM: CPT | Performed by: INTERNAL MEDICINE

## 2021-10-11 PROCEDURE — 99222 1ST HOSP IP/OBS MODERATE 55: CPT | Mod: GC | Performed by: NEUROLOGICAL SURGERY

## 2021-10-11 PROCEDURE — 84100 ASSAY OF PHOSPHORUS: CPT | Performed by: STUDENT IN AN ORGANIZED HEALTH CARE EDUCATION/TRAINING PROGRAM

## 2021-10-11 PROCEDURE — HZ2ZZZZ DETOXIFICATION SERVICES FOR SUBSTANCE ABUSE TREATMENT: ICD-10-PCS | Performed by: STUDENT IN AN ORGANIZED HEALTH CARE EDUCATION/TRAINING PROGRAM

## 2021-10-11 PROCEDURE — 96366 THER/PROPH/DIAG IV INF ADDON: CPT | Performed by: EMERGENCY MEDICINE

## 2021-10-11 PROCEDURE — 36415 COLL VENOUS BLD VENIPUNCTURE: CPT | Performed by: INTERNAL MEDICINE

## 2021-10-11 PROCEDURE — 83605 ASSAY OF LACTIC ACID: CPT | Performed by: STUDENT IN AN ORGANIZED HEALTH CARE EDUCATION/TRAINING PROGRAM

## 2021-10-11 PROCEDURE — 83735 ASSAY OF MAGNESIUM: CPT | Performed by: STUDENT IN AN ORGANIZED HEALTH CARE EDUCATION/TRAINING PROGRAM

## 2021-10-11 PROCEDURE — 82248 BILIRUBIN DIRECT: CPT | Performed by: STUDENT IN AN ORGANIZED HEALTH CARE EDUCATION/TRAINING PROGRAM

## 2021-10-11 PROCEDURE — 87522 HEPATITIS C REVRS TRNSCRPJ: CPT | Performed by: STUDENT IN AN ORGANIZED HEALTH CARE EDUCATION/TRAINING PROGRAM

## 2021-10-11 PROCEDURE — 95819 EEG AWAKE AND ASLEEP: CPT | Mod: 26 | Performed by: PSYCHIATRY & NEUROLOGY

## 2021-10-11 PROCEDURE — 250N000011 HC RX IP 250 OP 636: Performed by: EMERGENCY MEDICINE

## 2021-10-11 PROCEDURE — 81001 URINALYSIS AUTO W/SCOPE: CPT | Performed by: EMERGENCY MEDICINE

## 2021-10-11 PROCEDURE — 85004 AUTOMATED DIFF WBC COUNT: CPT | Performed by: STUDENT IN AN ORGANIZED HEALTH CARE EDUCATION/TRAINING PROGRAM

## 2021-10-11 PROCEDURE — 80307 DRUG TEST PRSMV CHEM ANLYZR: CPT | Performed by: EMERGENCY MEDICINE

## 2021-10-11 PROCEDURE — 120N000003 HC R&B IMCU UMMC

## 2021-10-11 PROCEDURE — 93005 ELECTROCARDIOGRAM TRACING: CPT

## 2021-10-11 PROCEDURE — 93306 TTE W/DOPPLER COMPLETE: CPT | Mod: 26 | Performed by: INTERNAL MEDICINE

## 2021-10-11 PROCEDURE — 84132 ASSAY OF SERUM POTASSIUM: CPT | Performed by: EMERGENCY MEDICINE

## 2021-10-11 PROCEDURE — 80053 COMPREHEN METABOLIC PANEL: CPT | Performed by: EMERGENCY MEDICINE

## 2021-10-11 PROCEDURE — 96375 TX/PRO/DX INJ NEW DRUG ADDON: CPT | Performed by: EMERGENCY MEDICINE

## 2021-10-11 PROCEDURE — 86706 HEP B SURFACE ANTIBODY: CPT | Performed by: STUDENT IN AN ORGANIZED HEALTH CARE EDUCATION/TRAINING PROGRAM

## 2021-10-11 RX ORDER — DIAZEPAM 5 MG
10 TABLET ORAL EVERY 30 MIN PRN
Status: DISCONTINUED | OUTPATIENT
Start: 2021-10-11 | End: 2021-10-12

## 2021-10-11 RX ORDER — AMOXICILLIN 250 MG
1 CAPSULE ORAL 2 TIMES DAILY PRN
Status: DISCONTINUED | OUTPATIENT
Start: 2021-10-11 | End: 2021-10-14 | Stop reason: HOSPADM

## 2021-10-11 RX ORDER — CEFTRIAXONE 1 G/1
1 INJECTION, POWDER, FOR SOLUTION INTRAMUSCULAR; INTRAVENOUS ONCE
Status: COMPLETED | OUTPATIENT
Start: 2021-10-11 | End: 2021-10-11

## 2021-10-11 RX ORDER — HYDROXYZINE HYDROCHLORIDE 25 MG/1
25 TABLET, FILM COATED ORAL EVERY 6 HOURS PRN
Status: DISCONTINUED | OUTPATIENT
Start: 2021-10-11 | End: 2021-10-11

## 2021-10-11 RX ORDER — POTASSIUM CHLORIDE 750 MG/1
40 TABLET, EXTENDED RELEASE ORAL ONCE
Status: COMPLETED | OUTPATIENT
Start: 2021-10-11 | End: 2021-10-11

## 2021-10-11 RX ORDER — POLYETHYLENE GLYCOL 3350 17 G/17G
17 POWDER, FOR SOLUTION ORAL DAILY PRN
Status: DISCONTINUED | OUTPATIENT
Start: 2021-10-11 | End: 2021-10-14 | Stop reason: HOSPADM

## 2021-10-11 RX ORDER — POTASSIUM CHLORIDE 7.45 MG/ML
10 INJECTION INTRAVENOUS ONCE
Status: COMPLETED | OUTPATIENT
Start: 2021-10-11 | End: 2021-10-11

## 2021-10-11 RX ORDER — ACETAMINOPHEN 650 MG/1
650 SUPPOSITORY RECTAL EVERY 6 HOURS PRN
Status: DISCONTINUED | OUTPATIENT
Start: 2021-10-11 | End: 2021-10-14 | Stop reason: HOSPADM

## 2021-10-11 RX ORDER — MAGNESIUM OXIDE 400 MG/1
400 TABLET ORAL 2 TIMES DAILY
Status: COMPLETED | OUTPATIENT
Start: 2021-10-11 | End: 2021-10-13

## 2021-10-11 RX ORDER — FOLIC ACID 1 MG/1
1 TABLET ORAL DAILY
Status: DISCONTINUED | OUTPATIENT
Start: 2021-10-12 | End: 2021-10-14 | Stop reason: HOSPADM

## 2021-10-11 RX ORDER — AMOXICILLIN 250 MG
2 CAPSULE ORAL 2 TIMES DAILY PRN
Status: DISCONTINUED | OUTPATIENT
Start: 2021-10-11 | End: 2021-10-14 | Stop reason: HOSPADM

## 2021-10-11 RX ORDER — MULTIVITAMIN,THERAPEUTIC
1 TABLET ORAL DAILY
Status: DISCONTINUED | OUTPATIENT
Start: 2021-10-12 | End: 2021-10-14 | Stop reason: HOSPADM

## 2021-10-11 RX ORDER — MULTIPLE VITAMINS W/ MINERALS TAB 9MG-400MCG
1 TAB ORAL DAILY
Status: DISCONTINUED | OUTPATIENT
Start: 2021-10-12 | End: 2021-10-11

## 2021-10-11 RX ORDER — LIDOCAINE 40 MG/G
CREAM TOPICAL
Status: DISCONTINUED | OUTPATIENT
Start: 2021-10-11 | End: 2021-10-14 | Stop reason: HOSPADM

## 2021-10-11 RX ORDER — LEVETIRACETAM 750 MG/1
750 TABLET ORAL 2 TIMES DAILY
Status: DISCONTINUED | OUTPATIENT
Start: 2021-10-11 | End: 2021-10-14 | Stop reason: HOSPADM

## 2021-10-11 RX ORDER — LANOLIN ALCOHOL/MO/W.PET/CERES
100 CREAM (GRAM) TOPICAL DAILY
Status: DISCONTINUED | OUTPATIENT
Start: 2021-10-19 | End: 2021-10-14 | Stop reason: HOSPADM

## 2021-10-11 RX ORDER — POTASSIUM CHLORIDE 750 MG/1
20 TABLET, EXTENDED RELEASE ORAL ONCE
Status: COMPLETED | OUTPATIENT
Start: 2021-10-11 | End: 2021-10-11

## 2021-10-11 RX ORDER — LANOLIN ALCOHOL/MO/W.PET/CERES
100 CREAM (GRAM) TOPICAL DAILY
Status: DISCONTINUED | OUTPATIENT
Start: 2021-10-12 | End: 2021-10-14 | Stop reason: HOSPADM

## 2021-10-11 RX ORDER — ACETAMINOPHEN 325 MG/1
650 TABLET ORAL EVERY 6 HOURS PRN
Status: DISCONTINUED | OUTPATIENT
Start: 2021-10-11 | End: 2021-10-14 | Stop reason: HOSPADM

## 2021-10-11 RX ORDER — HYDROXYZINE HYDROCHLORIDE 25 MG/1
50 TABLET, FILM COATED ORAL EVERY 6 HOURS PRN
Status: DISCONTINUED | OUTPATIENT
Start: 2021-10-11 | End: 2021-10-11

## 2021-10-11 RX ORDER — HYDROCORTISONE 2.5 %
CREAM (GRAM) TOPICAL 4 TIMES DAILY
Status: DISCONTINUED | OUTPATIENT
Start: 2021-10-11 | End: 2021-10-14

## 2021-10-11 RX ADMIN — LEVETIRACETAM 750 MG: 750 TABLET, FILM COATED ORAL at 20:46

## 2021-10-11 RX ADMIN — POTASSIUM & SODIUM PHOSPHATES POWDER PACK 280-160-250 MG 1 PACKET: 280-160-250 PACK at 20:46

## 2021-10-11 RX ADMIN — POTASSIUM CHLORIDE 40 MEQ: 750 TABLET, EXTENDED RELEASE ORAL at 23:59

## 2021-10-11 RX ADMIN — HYDROCORTISONE: 25 CREAM TOPICAL at 10:58

## 2021-10-11 RX ADMIN — POTASSIUM CHLORIDE 10 MEQ: 7.46 INJECTION, SOLUTION INTRAVENOUS at 02:57

## 2021-10-11 RX ADMIN — FOLIC ACID: 5 INJECTION, SOLUTION INTRAMUSCULAR; INTRAVENOUS; SUBCUTANEOUS at 08:02

## 2021-10-11 RX ADMIN — Medication 400 MG: at 20:46

## 2021-10-11 RX ADMIN — THIAMINE HYDROCHLORIDE 250 MG: 100 INJECTION, SOLUTION INTRAMUSCULAR; INTRAVENOUS at 10:57

## 2021-10-11 RX ADMIN — FOLIC ACID 1 MG: 5 INJECTION, SOLUTION INTRAMUSCULAR; INTRAVENOUS; SUBCUTANEOUS at 00:10

## 2021-10-11 RX ADMIN — HYDROCORTISONE: 25 CREAM TOPICAL at 20:46

## 2021-10-11 RX ADMIN — THIAMINE HYDROCHLORIDE 100 MG: 100 INJECTION, SOLUTION INTRAMUSCULAR; INTRAVENOUS at 00:13

## 2021-10-11 RX ADMIN — POTASSIUM CHLORIDE 40 MEQ: 750 TABLET, EXTENDED RELEASE ORAL at 16:18

## 2021-10-11 RX ADMIN — CEFTRIAXONE SODIUM 1 G: 1 INJECTION, POWDER, FOR SOLUTION INTRAMUSCULAR; INTRAVENOUS at 02:14

## 2021-10-11 RX ADMIN — POTASSIUM CHLORIDE 10 MEQ: 7.46 INJECTION, SOLUTION INTRAVENOUS at 00:14

## 2021-10-11 RX ADMIN — HYDROCORTISONE: 25 CREAM TOPICAL at 16:18

## 2021-10-11 RX ADMIN — POTASSIUM CHLORIDE 10 MEQ: 7.46 INJECTION, SOLUTION INTRAVENOUS at 04:13

## 2021-10-11 RX ADMIN — POTASSIUM CHLORIDE 20 MEQ: 750 TABLET, EXTENDED RELEASE ORAL at 22:12

## 2021-10-11 ASSESSMENT — MIFFLIN-ST. JEOR: SCORE: 1458.5

## 2021-10-11 ASSESSMENT — ACTIVITIES OF DAILY LIVING (ADL)
ADLS_ACUITY_SCORE: 8
ADLS_ACUITY_SCORE: 8
ADLS_ACUITY_SCORE: 12
ADLS_ACUITY_SCORE: 8

## 2021-10-11 ASSESSMENT — ENCOUNTER SYMPTOMS
ABDOMINAL PAIN: 1
FATIGUE: 1

## 2021-10-11 NOTE — CONSULTS
[facility]  Neurology Consultation    Patient Name:  Mario Lamar II  MRN:  [id number]    :  1965  Date of Service:  10/10/2021  Primary care provider:  Elle Aguilar      The neurology consultation service was asked to see Mario Lamar II by Dr. Martins to evaluate for seizures.     History of Present Illness   Mario Lamar II is a 56 year old male with PMH of depression, EtOH use, EtOH withdrawal and seizures who presented following a fall. The patient was altered and unable to provide a reliable history, therefore it was obtained through discussion with ED provider and through chart review.      The patient was brought in via EMS following a fall at [employer]. He is confused and unable to provide much history. In the ED, he found to be in afib with RVR. ED staff witnessed the patient having generalized shaking movements. There was upward deviation of his eyes. He has been tachycardic throughout his time in the ED. Pulse oximeter fell off during the event. He went straight to the CT scanner and non-con head CT demonstrated notable atrophy and chronic white matter disease without evidence of acute bleeding.     During evaluation in the ED, the patient reported being at his mother home and then came into the ED for evaluation. He could not verbalize the reason for his presentation. He reports that he last drank alcohol over one year ago (per triage note, his last drink was one day ago). He was following simple commands, but could not provide additional history.    Currently, he denies headache, double vision, tinnitus, dysphagia, focal weakness, numbness/tingling.     ROS  A 10-point ROS performed and negative unless documented in HPI.    PMH  Past Medical History:   Diagnosis Date     Depression      Pancreatitis      PTSD (post-traumatic stress disorder)      Seizures (H)     withdrawl     Substance abuse (H)     alcohol abuse     Past  "Surgical History:   Procedure Laterality Date     ORTHOPEDIC SURGERY      Right ankle orthopedic surgery after fracture.       Medications   (Not in a hospital admission)      Allergies  No Known Allergies    Social History  Social History     Tobacco Use     Smoking status: Former Smoker     Packs/day: 0.50     Years: 33.00     Pack years: 16.50     Quit date: 10/26/2017     Years since quitting: 3.9     Smokeless tobacco: Former User   Substance Use Topics     Alcohol use: Yes     Comment: daily, 0.5 gal of vodka       Family History    Family History   Problem Relation Age of Onset     Alcoholism Sister      Substance Abuse Sister      Substance Abuse Mother      Substance Abuse Father      Substance Abuse Maternal Grandmother      Substance Abuse Maternal Grandfather      Substance Abuse Paternal Grandmother      Substance Abuse Paternal Grandfather      Substance Abuse Brother      Substance Abuse Brother      Substance Abuse Brother      Substance Abuse Brother      Substance Abuse Brother         Physical Examination     Vitals:   /76   Pulse 119   Temp 98.3  F (36.8  C) (Oral)   Resp 22   Ht 1.727 m (5' 8\")   Wt 65.8 kg (145 lb)   SpO2 99%   BMI 22.05 kg/m      General: No acute distress, laying in bed.  HEENT: Normocephalic, atraumatic. Sclera anicteric. Moist mucus membranes.  Cardiac: Extremities appear well-perfused.  Chest: Non-labored, no accessory muscle use.  Abdomen: Soft, non-distended, non-tender.  Extremities: Warm, no edema, pedal pulses palpable.  Skin: Warm, dry. No jaundice.     Neuro:  Mental status: Eyes are closed, but open to voice. He is conversant. He is unable to answer orientation questions (cannot identify self or current location, reports current year as \"67\"). He follows simple commands.  Cranial nerves: Blinks bilaterally to threat. Pupils 3-4mm, equal, round, reactive. Conjugate gaze. EOMI. Facial sensation intact to light touch in V1-V3 regions. Face symmetric. " Shoulder shrug strong. Tongue protrudes without deviation. No dysarthria.  Motor: Normal bulk and tone. No abnormal movements. He is unable to fully follow motor examination and give inpersistent effort. He is resisting movements in all muscles groups of the upper and lower extremities. There does not appear to be focal weakness.  Reflexes:    Right Left   Biceps 2+ 2+   Brachioradialis 2+ 2+   Triceps     Singh Absent Absent   Patellar 2+ 2+   Achilles 2+ 2+   Babinski Downgoing Downgoing     Sensory: Intact to light touch, pinprick, and vibration in the upper and lower extremities.  Coordination: Unable to formally assess as the patient has difficulty following examination.  Gait: Deferred.     Investigations     Labs  BMP  Recent Labs   Lab 10/10/21  2225 10/10/21  2216   NA  --  137  140   POTASSIUM  --  2.9*  2.9*   CHLORIDE  --  104   CO2  --  16*   BUN  --  8   CR 0.6* 0.57*   JOHN  --  8.8       CBC  Recent Labs   Lab 10/10/21  2216   WBC 11.1*   HGB 15.5  16.0          COAGS  Recent Labs   Lab 10/10/21  2216   INR 1.25*       ABG  Recent Labs   Lab 10/10/21  2231   PH 7.21*     Imaging  Personally reviewed. The radiologists interpretation is documented below.    Head CT without contrast 10/10/2021:  1.  No acute intracranial process.  2.  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  3.  Prominent atrophy for age.     Impression & Recommendations   This is 56 year old male with history of EtOH use and EtOH withdrawal seizure who presented following fall and had 2 seizures in the ED. He was loaded with Keppra in the ED, but seizures appear to be provoked in the setting of EtOH withdrawal seizures. No additional work-up needed overnight. Recommend monitoring for alcohol withdrawal/CIWA and treating with BZDs based on scoring. No need for maintenance AEDs.    -No need for maintenance AEDs  -CIWA protocol and BZDs for withdrawal symptoms  -Please start high-dose  thiamine  -The neurology day team will re-evaluate in the AM and determine need for additional work-up while in the hospital.    Thank you for allowing the neurology service to participate in the care of Mario Dixondeirdrekacie TRIPP.  Please call with questions.      Patient wasdiscussed with the attending neurologist, Dr. Crawford. He will be formally evaluated by the on-coming neurology attending, Dr. Church.    Fariha Duenas MD  Neurology PGY-3  10/10/2021 11:11 PM

## 2021-10-11 NOTE — ED NOTES
Was speaking with patient while starting IV. Pt began seizing, generalized,help called for, NRB attached to pt, seizure lasting approximately one minute. MD in room, will bring pt to Room 3.

## 2021-10-11 NOTE — ED TRIAGE NOTES
Pt BIBA s/p trip and fall while in [employer].  Pt reports he landed on his back.  Denies LOC.  H/o ETOH abuse with last drink yesterday.  No neck pain on palpation.  No visible injuries noted.  Pt seems confused.  Having difficulty completing triage questions.

## 2021-10-11 NOTE — ED NOTES
Pt returns from CT. Following commands, eliseo nuñezrogers recall how he arrived to [facility]. Moves all  Extremities, PERRLA

## 2021-10-11 NOTE — CONSULTS
[facility]   Consult Note - Addiction Service     Date of Admission:  10/10/2021    Consult Requested by: Aquilino Frye MD  Reason for Consult: Alcohol use disorder, presenting for EtoH withdrawal    Assessment & Plan   Mario Lamar II admitted on 10/10/2021. The patient has a PMHx that includes of substance use disorder, suicidal ideation, depression, hepatic steatosis, TBI, and alcohol induced mood disorder. He presented after a fall and subsequently had 2 witnessed seizures in the ED. He was found to have multiple compression fractures and is admitted for further work-up. Addiction medicine is consulted given patient's h/o AUD    Severe Alcohol use disorder:  Wernicke's encephalopathy  Hepatic cirrhosis and steatosis  LFTs improving. Unclear how engaged he is about cessation right now but right now he is interested in the following.  -Start naltrexone 50mg.  Will need to watch LFTs as outpatient.  Max dose is 100mg.  -We will provide him info on AA and outpatient treatment.  He will make the phone calls.  -He has declined inpatient treatment as he is most worried about losing his housing and taking care of his cats.  -Agree with the folic acid and thiamine    For alcohol withdrawal:  Appears to be towards the end of his withdrawal.  If there still concerns of withdrawal, I would consider starting high dose gabapentin of at least 600mg TID with quick taper off.    Peer Support: Our per  will meet him if still hospitalized on Thursday, to provide additional outpatient resources  To contact Jocelyn Peer  from [facility]:  Please call or text: [phone]    Linkage to care:  -He plans to follow up at either [facility] or [facility] for his ongoing care.    The patient's care was discussed with the Care Coordinator/, Patient and Primary team.    I spent 120 minutes on the unit/floor managing the care of Mario Lamar II. Over 50% of my  "time was spent on the following:   - Counseling the patient and/or family regarding: diagnostic results, prognosis, risks and benefits of treatment options and recommended follow-up  - Coordination of care with the: consultant(s) and care coordinator/  - Spoke risk benefit of meds and treatment for AUD.  - Talk about his network for support and his housing    Marcial Garcia MD  Addiction Medicine Service  [facility]   Contact information available via [facility] Paging/Directory  Please see sign in/sign out for up to date coverage information    ChAT team (Addiction Consult Team): Coverage Monday-Friday 8-4pm    Provider (Pager)  (Pager)   Mon Dr. Kirt Morales [phone]   Tues Dr. Kirt Morales [phone]   Wed Dr. Kirt Morales [phone] None   Thurs Dr. Marcial Garcia [phone]   Fri Dr. Matt Velasquez [phone]   Sat-[facility] Team- Refer to [facility].  For urgent needs, please place a  consult for psychiatry. None     ______________________________________________________________________    Chief Complaint   Consult for \"Alcohol use disorder, presenting for EtoH withdrawal\"    History is obtained from the patient and electronic health record    History of Present Illness   Mario Lamar II who was admitted for additional workup following a witnessed seizure on 10/10/2021.    Per EMR : \"Mario Lamar II is a 56 year old male who presents with seizure, trauma.     Drinks 1 L vodka over 2 days  Drinking since his dad  and he wasn't clear when but maybe in the 90s.  Started to increase drinking again on 1 year ago when sister  from alcohol use.  Last inpt treatment was 20 years ago.  Has tried acomprosate and naltrexone  Is not smoking or other drug use.    Utox and GURWINDER on admission were negative.    History of [facility] or long term? 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 Had a fall in the shower 2 weeks ago, no head trauma.    Past Medical History:   Diagnosis Date     Depression      Pancreatitis      PTSD (post-traumatic stress disorder)      Seizures (H)     withdrawl     Substance abuse (H)     alcohol abuse       Past Surgical History:   Procedure Laterality Date     ORTHOPEDIC SURGERY      Right ankle orthopedic surgery after fracture.       Social History   Social History     Socioeconomic History     Marital status:      Spouse name: Not on file     Number of children: Not on file     Years of education: Not on file     Highest education level: Not on file   Occupational History     Not on file   Tobacco Use     Smoking status: Former Smoker     Packs/day: 0.50     Years: 33.00     Pack years: 16.50     Quit date: 10/26/2017     Years since quitting: 3.9     Smokeless tobacco: Former User   Substance and Sexual Activity     Alcohol use: Yes     Comment: daily, 0.5 gal of vodka     Drug use: No     Sexual activity: Yes     Partners: Female   Other Topics Concern     Parent/sibling w/ CABG, MI or angioplasty before 65F 55M? Not Asked   Social History Narrative     Not on file     Social Determinants of Health     Financial Resource Strain:      Difficulty of Paying Living Expenses:    Food Insecurity:      Worried About Running Out of Food in the Last Year:      Ran Out of Food in the Last Year:    Transportation Needs:      Lack of Transportation (Medical):      Lack of Transportation (Non-Medical):    Physical Activity:      Days of  Exercise per Week:      Minutes of Exercise per Session:    Stress:      Feeling of Stress :    Social Connections:      Frequency of Communication with Friends and Family:      Frequency of Social Gatherings with Friends and Family:      Attends Spiritism Services:      Active Member of Clubs or Organizations:      Attends Club or Organization Meetings:      Marital Status:    Intimate Partner Violence:      Fear of Current or Ex-Partner:      Emotionally Abused:      Physically Abused:      Sexually Abused:        Family History   Family History   Problem Relation Age of Onset     Alcoholism Sister      Substance Abuse Sister      Substance Abuse Mother      Substance Abuse Father      Substance Abuse Maternal Grandmother      Substance Abuse Maternal Grandfather      Substance Abuse Paternal Grandmother      Substance Abuse Paternal Grandfather      Substance Abuse Brother      Substance Abuse Brother      Substance Abuse Brother      Substance Abuse Brother      Substance Abuse Brother        Medications   No medications prior to admission.       Allergies   No Known Allergies    Physical Exam   Vital Signs: Temp: 96.4  F (35.8  C) Temp src: Oral BP: 99/41 Pulse: 75   Resp: 16 SpO2: 97 % O2 Device: None (Room air)    Weight: 125 lbs 0 oz    General Appearance: Appears older than stated age, not in apparent distress  HEENT: No scleral icterus, no conjunctival injection, no periorbital edema  Respiratory: Normal WOB on RA. Good air entry bilaterally; vesicular breath sounds, no wheezes or crackles  Cardiovascular: RRR, S1, S2, no m/r/g. . No LE edema  GI: Soft, non-tender, non-distended  Musculoskeletal:   Neurologic: Awake and alert.   Psychiatric: Speech is logical and coherent. Pleasant, no external signs of anxiety or agitation.    Due to regulation of Title 42 of the Code of Federal Regulations (CFR) Part 2: Confidentiality laws apply to this note and the information wherein.  Thus, this note cannot be copy  and pasted into any other health care staff's note nor can it be included in general medical records sent to ANY outside agency without the patient's written consent.

## 2021-10-11 NOTE — H&P
[facility]    [facility]      Date of Admission:  10/10/2021    Assessment & Plan    Mario Lamar II is a 56 year old man with a history of substance use disorder, suicidal ideation, depression who presents after seizure found to have multiple compression fractures, hepatic steatosis, A. fib with RVR (resolved) admitted for further work-up.    Seizure disorder  Syncope prior to fall  Patient had 2 witnessed seizures in the ED, with generalized shaking movements in upper deviation of his eyes.  Patient was Keppra loaded in the ED.  Patient reports history of seizures, alcohol withdrawal may be complicating this presentation.  He is currently alert, conversant, following commands.  - Orthostatic vitals  - Telemetry  - Consider echocardiogram  - Neurology consulted - follow-up further recommendations  - No need for maintenance antiepileptic drugs per Neurology  - Seizure precautions    Trauma  Compression fractures of T2 to L4, age-indeterminate  Moderate to severe foraminal stenosis lumbar vertebrae   Patient presented after fall at [employer], had seizure in the ED. Given the age of the compression fractures is indeterminate, unclear if this all happened after the fall.   - Trauma surgery evaluated in ED, appreciate recommendations  - Neurosurgery consulted, appreciate recommendations  - Continue C-collar, T and L-spine precautions  - Plan for TLSO in a.m.    Concern for alcohol withdrawal  Substance use disorder  Depression  Last drink per patient is a week ago, alcohol level negative.  Patient reports he has used marijuana, smokes cigarettes but quit a year ago. He is unclear about IV drug use.  On admission, acetaminophen, salicylate level negative.  On exam patient without tremors, or anxiety, and was appropriately conversing without agitation.  - CIWA protocol with diazepam  - Telemetry  - Follow-up U tox  - Consider chemical  dependency consult - discuss with patient if he would be amenable to this  - Consider psychiatry consultation    Anion gap metabolic acidosis  Lactic acid 12 on admission, drawn right after seizure. Normalized to 1.0 on recheck. Leukocytosis reactive.    - Repeat VBG  - Fluid  - Blood cultures pending     Elevated liver enzymes and bilirubin   Coagulopathy of liver disease  Hepatic cirrhosis and steatosis  Likely secondary to alcohol use disorder.  He had been evaluated by hepatology and a prior admission in 2018 was not a candidate for transplant given acute alcohol abuse.  - Direct and total bili   - Trend CMP  - Consider hematology consult    Subclinical hypothyroidism  Elevated TSH, normal T4.  May be related to toxic substances.   - Obtain history determine if symptomatic to determine if needs treatment    Hypokalemia, repleted  - Repeat K check   - Potassium replacement protocol    Atrial fibrillation with RVR, resolved  In the setting of seizure, resolved spontaneously.  - Repeat EKG for baseline    Enlarged lymph node at gastrohepatic ligament 1.2 cm  -Follow-up in 6 to 12 months    Patchy bilateral groundglass opacities and atelectasis in lungs  Covid negative, no cough, hypoxia, white count, less concerning for infection.     Diet: Advance Diet as Tolerated: Clear Liquid Diet  DVT Prophylaxis: Pneumatic Compression Devices  Ziegler Catheter: Not present  Fluids: As above  Central Lines: None  Code Status: Full Code    Clinically Significant Risk Factors Present on Admission             # Coagulation Defect: INR = 1.25 (Ref range: 0.85 - 1.15) and/or PTT = N/A on admission, will monitor for bleeding       Disposition Plan   Expected discharge: TBD recommended to prior living arrangement once workup complete.      Patient to be staffed in the AM.     Chapo Mcfarlane MD  PGY-2 Internal Medicine  [facility] Service  ______________________________________________________________________    Chief Complaint    Seizure    History is obtained from the patient and chart review.     History of Present Illness   Mario Lamar II is a 56 year old male who presents with seizure, trauma.    History limited by patient in postictal state and given patient is a poor historian.  Patient reports he feels fine this morning.  He reports that he was at home doing the dishes and next he remembers he is here.  He says he had had seizures in the past maybe 2 weeks ago.  He reports he uses alcohol, vodka and beer, last drink about a week ago.  In other notes he reported his last drink was yesterday.  He says he quit smoking and marijuana about a year ago.  When asked about IV drug use he reports he used 2 weeks ago, but unable to say which drugs, reports marijuana.  He denies any headache, abdominal pain, chest pain, shortness of breath, dysuria.    Per chart review, patient was brought in after he tripped and fell at [employer].  Reportedly EMS was called by bystanders.  When he was brought to the ED, he started seizing at 9:30 PM.  He had another round of seizure activity around 11 PM.    Patient reports that he lives alone and in [place], no pets.     Review of Systems    The 10 point Review of Systems is negative other than noted in the HPI or here.    Past Medical History    I have reviewed this patient's medical history and updated it with pertinent information if needed.   Past Medical History:   Diagnosis Date     Depression      Pancreatitis      PTSD (post-traumatic stress disorder)      Seizures (H)     withdrawl     Substance abuse (H)     alcohol abuse     Past Surgical History   I have reviewed this patient's surgical history and updated it with pertinent information if needed.  Past Surgical History:   Procedure Laterality Date     ORTHOPEDIC SURGERY      Right ankle orthopedic surgery after fracture.      Social History   I have reviewed this patient's social history and updated it with pertinent information if  needed. Mario Toro Willard II  reports that he quit smoking about 3 years ago. He has a 16.50 pack-year smoking history. He has quit using smokeless tobacco. He reports current alcohol use. He reports that he does not use drugs.    Family History   I have reviewed this patient's family history and updated it with pertinent information if needed.  Family History   Problem Relation Age of Onset     Alcoholism Sister      Substance Abuse Sister      Substance Abuse Mother      Substance Abuse Father      Substance Abuse Maternal Grandmother      Substance Abuse Maternal Grandfather      Substance Abuse Paternal Grandmother      Substance Abuse Paternal Grandfather      Substance Abuse Brother      Substance Abuse Brother      Substance Abuse Brother      Substance Abuse Brother      Substance Abuse Brother      Prior to Admission Medications   Prior to Admission Medications   Prescriptions Last Dose Informant Patient Reported? Taking?   FLUoxetine HCl (PROZAC PO)   Yes No   spironolactone (ALDACTONE) 25 MG tablet   No No   Sig: Take 1 tablet (25 mg) by mouth 2 times daily      Facility-Administered Medications: None     Allergies   No Known Allergies    Physical Exam   Vital Signs: Temp: 98.3  F (36.8  C) Temp src: Oral BP: 101/69 Pulse: 75   Resp: 17 SpO2: 98 % O2 Device: None (Room air)    Weight: 145 lbs 0 oz    General Appearance: Disheveled, lying in bed, no acute distress, eyes closed but wakes to touch and voice and is conversant  HEENT: EOMI, no periorbital edema  Respiratory: Normal work of breathing on ambient air, clear to auscultation bilaterally without wheezes or crackles  Cardiovascular: Regular rate, pulses 2+ radial  GI: Soft, nontender not nondistended, positive bowel sounds  Skin: Legs with scabs, toes with hyperpigmented areas  Musculoskeletal: Moving extremities spontaneously, no lower extremity edema  Neurologic: Alert and oriented x3, able to follow commands, conversant  Psychiatric: Mood  appropriate, no agitation or psychomotor slowing    Data   Data reviewed today: I reviewed all medications, new labs and imaging results over the last 24 hours. I personally reviewed the EKG tracing showing A. fib with RVR.     Recent Labs   Lab 10/11/21  0119 10/10/21  2225 10/10/21  2216 10/10/21  2211   WBC  --   --  11.1*  --    HGB  --   --  15.5  16.0  --    MCV  --   --  100  --    PLT  --   --  159  --    INR  --   --  1.25*  --    NA  --   --  137  140  --    POTASSIUM 3.4  --  2.9*  2.9*  --    CHLORIDE  --   --  104  --    CO2  --   --  16*  --    BUN  --   --  8  --    CR  --  0.6* 0.57*  --    ANIONGAP  --   --  17*  --    JOHN  --   --  8.8  --    GLC  --   --  112*  123* 147*   ALBUMIN  --   --  3.7  --    PROTTOTAL  --   --  9.8*  --    BILITOTAL  --   --  1.8*  --    ALKPHOS  --   --  350*  --    ALT  --   --  79*  --    AST  --   --  188*  --    LIPASE  --   --  149  --    TROPONIN  --   --  <0.015  --

## 2021-10-11 NOTE — PROGRESS NOTES
S: Pt seen bedside [facility] room 6238-01 in good spirits. He reports having a TLSO ( a white one) at home. O: I see the EPIC order for the TLSO due to multiple spinal Fx's of indeterminate duration. A: He was fit with an [place] [address] TLSO and his nurse was shown and he was shown how to apply and take off the TLSO. Written instructions were left with the TLSO. Patient did not want to get up out of bed so the TLSO was not left on him. P: FU PRN, G: The goal is to support his spine and restrict spinal motion.  Electronically signed Eduard Wilson , LPO.

## 2021-10-11 NOTE — PHARMACY-ADMISSION MEDICATION HISTORY
Admission Medication History Completed by Pharmacy    See [facility] Admission Navigator for allergy information, preferred outpatient pharmacy, prior to admission medications and immunization status.     Medication History Sources:     Patient, Surescripts, Care Everywhere, [facility] pharmacy    Changes made to PTA medication list (reason):    Added: None    Deleted: Removed fluoxetine and spironolactone    Changed: None    Additional Information:    Mario was not able to give me much information other that he sometimes uses ibuprofen and a medication to help him with alcohol. He was able to tell me he fills his medication at [facility].    Looking through Care Everywhere he has been on phenytoin 300mg ER in the past, lactulose, acamprosate, fluoxetine, and spironolactone.  Per [facility] he has not filled anything for awhile so it doesn't appear he is regularly taking his medications at home.    Prior to Admission medications    Not on File       Date completed: 10/11/21    Medication history completed by: Kevin Solis [facility]

## 2021-10-11 NOTE — CONSULTS
[facility]       NEUROSURGERY CONSULTATION    This consultation was requested by Dr. Martins from the ED service.    Time of Admission/Consult Request (page/call): 12:15 AM  Time of my evaluation: 12:30 AM    Reason for Consultation: compression fxs    HPI:    56M, PMH EtOH abuse, seizure disorder, recent recurrent falls, presenting with multiple Thoracic and lumbar age indeterminate fx's after a fall.     Patient is a poor historian but believes he fell in the hallway after doing dishes in the kitchen. Per EMS, he had a fall at a target and EMS was called. On arrival to ED was found to be in AFIB with RVR with self correction. Had 2 witnessed GTCs in ED. Treated with Keppra load. CT CAP done showing several thoracic and lumbar fractures. Denies back pain. Denies HA. No bowel or bladder incontinence. Denies unilateral weaknes, endorses chronic generalized weakness.     PAST MEDICAL HISTORY:   Past Medical History:   Diagnosis Date     Depression      Pancreatitis      PTSD (post-traumatic stress disorder)      Seizures (H)     withdrawl     Substance abuse (H)     alcohol abuse       PAST SURGICAL HISTORY:   Past Surgical History:   Procedure Laterality Date     ORTHOPEDIC SURGERY      Right ankle orthopedic surgery after fracture.       FAMILY HISTORY:   Family History   Problem Relation Age of Onset     Alcoholism Sister      Substance Abuse Sister      Substance Abuse Mother      Substance Abuse Father      Substance Abuse Maternal Grandmother      Substance Abuse Maternal Grandfather      Substance Abuse Paternal Grandmother      Substance Abuse Paternal Grandfather      Substance Abuse Brother      Substance Abuse Brother      Substance Abuse Brother      Substance Abuse Brother      Substance Abuse Brother        SOCIAL HISTORY:   Social History     Tobacco Use     Smoking status: Former Smoker     Packs/day: 0.50     Years: 33.00     Pack years: 16.50     Quit  "date: 10/26/2017     Years since quitting: 3.9     Smokeless tobacco: Former User   Substance Use Topics     Alcohol use: Yes     Comment: daily, 0.5 gal of vodka       MEDICATIONS:  Current Outpatient Medications   Medication Sig Dispense Refill     FLUoxetine HCl (PROZAC PO)        spironolactone (ALDACTONE) 25 MG tablet Take 1 tablet (25 mg) by mouth 2 times daily 60 tablet 0       Allergies:  No Known Allergies    ROS: 10 point ROS of systems including Constitutional, Eyes, Respiratory, Cardiovascular, Gastroenterology, Genitourinary, Integumentary, Muscularskeletal, Psychiatric were all negative except for pertinent positives noted in my HPI.    Physical exam:   Blood pressure 101/71, pulse 93, temperature 98.3  F (36.8  C), temperature source Oral, resp. rate 18, height 1.727 m (5' 8\"), weight 65.8 kg (145 lb), SpO2 100 %.  General: awake and alert, in collar,   HEENT: in collar, no signs of bruising or fall sequelae  PULM: breathing comfortably on room air  MSK: normal  : rectal tone in tact  NEUROLOGIC:  -- Awake; Alert; oriented x 1 (says February does not give year; says [facility] for hospital)  -- Follows commands briskly  -- +repetition, calculation, and naming  -- Speech fluent, spontaneous. No aphasia or dysarthria.  -- no gaze preference. No apparent hemineglect.  Cranial Nerves:  -- visual fields full to confrontation, PERRL 3-2mm bilat and brisk, extraocular movements intact  -- face symmetrical, tongue midline  -- sensory V1-V3 intact bilaterally  -- palate elevates symmetrically, uvula midline  -- hearing grossly intact bilat  -- Trapezii 5/5 strength bilat symmetric  -- Cerebellar: Finger nose finger without dysmetria, intact rapid alternating motions bilaterally    Motor:  Normal bulk / tone; no tremor, rigidity, or bradykinesia.  No muscle wasting or fasciculations  No Pronator Drift     Delt Bi Tri Hand Flexion/  Extension Iliopsoas Quadriceps Hamstrings Tibialis Anterior Gastroc    C5 C6 C7 " C8/T1 L2 L3 L4-S1 L4 S1   R 5 5 5 5 5 5 5 5 5   L 5 5 5 5 5 5 5 5 5     Sensory:  intact to LT x 4 extremities; full perineal sensation;       Reflexes: no ankle clonus       Bi Tri BR Marzena Pat Ach Bab     C5-6 C7-8 C6 UMN L2-4 S1 UMN   R 2+ 2+ 2+ Norm 2+ 2+ Norm   L 2+ 2+ 2+ Norm 2+ 2+ Norm      Gait: Deferred      IMAGING:  Recent Results (from the past 24 hour(s))   Head CT w/o contrast    Narrative    EXAM: CT HEAD W/O CONTRAST, CT LUMBAR SPINE W/O CONTRAST, CT CERVICAL SPINE W/O CONTRAST, CT THORACIC SPINE W/O CONTRAST  LOCATION: [facility]  DATE/TIME: 10/10/2021 10:56 PM    INDICATION: Fall, confusion. Pain. Tripped and fell.  COMPARISON: None.  TECHNIQUE:   1) Routine CT Head without IV contrast. Multiplanar reformats. Dose reduction techniques were used.   2) Routine CT Cervical Spine without IV contrast. Multiplanar reformats. Dose reduction techniques were used.   3) Routine CT Thoracic Spine without IV contrast. Multiplanar reformats. Dose reduction techniques were used.   4) Routine CT Lumbar Spine without IV contrast. Multiplanar reformats. Dose reduction techniques were used.     FINDINGS:   HEAD CT:   INTRACRANIAL CONTENTS: No intracranial hemorrhage, extraaxial collection, or mass effect. No CT evidence of acute infarct. 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 Moderate generalized volume loss. No hydrocephalus.     VISUALIZED ORBITS/SINUSES/MASTOIDS: No intraorbital abnormality. No paranasal sinus mucosal disease. No middle ear or mastoid effusion.    BONES/SOFT TISSUES: No acute abnormality.    CERVICAL SPINE CT:  VERTEBRA: Mild dextroscoliosis. Normal vertebral body heights and alignment. No fracture or posttraumatic subluxation.     CANAL/FORAMINA: No canal or neural foraminal stenosis.    PARASPINAL: No extraspinal abnormality. Visualized lung fields are clear.    THORACIC SPINE CT:  VERTEBRA: Mild  age-indeterminate compression deformities involving the superior endplate of T2-T4. Moderate compression deformities of T5 and to a lesser extent T6. Severe compression fracture of T7 with slight buckling of the posterior cortex into the   ventral canal. Moderate to severe compression fracture of T9 with minimal buckling of the posterior cortex into the ventral canal. Severe compression fracture of T11 with slight buckling of the posterior cortex into the ventral canal. Chronic fracture of   the posterior left third rib.     CANAL/FORAMINA: No canal or neural foraminal stenosis.    PARASPINAL: No extraspinal abnormality.    LUMBAR SPINE CT:  VERTEBRA: 5 lumbar type vertebra. Moderate age-indeterminate burst fracture of L1 with up to 60% loss of height centrally. 5 mm retropulsion of the posterior cortex into the ventral canal. Moderate to severe compression fracture involving the central   superior and inferior endplates of L3 and inferior endplate of L4. Healing fractures involving the left L1 and L2 transverse processes.     CANAL/FORAMINA: No high-grade central canal stenosis and mild central canal stenosis at L1. Moderate to severe foraminal stenosis bilaterally at L4-L5 and L5-S1.    PARASPINAL: No extraspinal abnormality.      Impression    IMPRESSION:  HEAD CT:  1.  No acute intracranial process.  2.  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  3.  Prominent atrophy for age.    CERVICAL SPINE CT:  1.  No fracture or posttraumatic subluxation.  2.  No high-grade spinal canal or neural foraminal stenosis.    THORACIC SPINE CT:  1.  Age-indeterminate severe compression deformities of T7 and T11.  2.  Age-indeterminate moderate to severe compression deformities of T5 and T9.  3.  Age-indeterminate moderate compression fracture of T6.   4.  Age-indeterminate mild compression deformities involving the superior endplates of T2-T4.  5.  No high-grade central canal stenosis or  neural foraminal narrowing.    LUMBAR SPINE CT:  1.  Age-indeterminate compression fractures of L1, L3 and L4 as described above.  2.  Moderate to severe foraminal stenosis bilaterally at L4-L5 and L5-S1.  3.  Healing left-sided transverse process fractures at L1 and L2.   Cervical spine CT w/o contrast    Narrative    EXAM: CT HEAD W/O CONTRAST, CT LUMBAR SPINE W/O CONTRAST, CT CERVICAL SPINE W/O CONTRAST, CT THORACIC SPINE W/O CONTRAST  LOCATION: [facility]  DATE/TIME: 10/10/2021 10:56 PM    INDICATION: Fall, confusion. Pain. Tripped and fell.  COMPARISON: None.  TECHNIQUE:   1) Routine CT Head without IV contrast. Multiplanar reformats. Dose reduction techniques were used.   2) Routine CT Cervical Spine without IV contrast. Multiplanar reformats. Dose reduction techniques were used.   3) Routine CT Thoracic Spine without IV contrast. Multiplanar reformats. Dose reduction techniques were used.   4) Routine CT Lumbar Spine without IV contrast. Multiplanar reformats. Dose reduction techniques were used.     FINDINGS:   HEAD CT:   INTRACRANIAL CONTENTS: No intracranial hemorrhage, extraaxial collection, or mass effect. No CT evidence of acute infarct. 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 Moderate generalized volume loss. No hydrocephalus.     VISUALIZED ORBITS/SINUSES/MASTOIDS: No intraorbital abnormality. No paranasal sinus mucosal disease. No middle ear or mastoid effusion.    BONES/SOFT TISSUES: No acute abnormality.    CERVICAL SPINE CT:  VERTEBRA: Mild dextroscoliosis. Normal vertebral body heights and alignment. No fracture or posttraumatic subluxation.     CANAL/FORAMINA: No canal or neural foraminal stenosis.    PARASPINAL: No extraspinal abnormality. Visualized lung fields are clear.    THORACIC SPINE CT:  VERTEBRA: Mild age-indeterminate compression deformities involving the superior endplate of T2-T4. Moderate  compression deformities of T5 and to a lesser extent T6. Severe compression fracture of T7 with slight buckling of the posterior cortex into the   ventral canal. Moderate to severe compression fracture of T9 with minimal buckling of the posterior cortex into the ventral canal. Severe compression fracture of T11 with slight buckling of the posterior cortex into the ventral canal. Chronic fracture of   the posterior left third rib.     CANAL/FORAMINA: No canal or neural foraminal stenosis.    PARASPINAL: No extraspinal abnormality.    LUMBAR SPINE CT:  VERTEBRA: 5 lumbar type vertebra. Moderate age-indeterminate burst fracture of L1 with up to 60% loss of height centrally. 5 mm retropulsion of the posterior cortex into the ventral canal. Moderate to severe compression fracture involving the central   superior and inferior endplates of L3 and inferior endplate of L4. Healing fractures involving the left L1 and L2 transverse processes.     CANAL/FORAMINA: No high-grade central canal stenosis and mild central canal stenosis at L1. Moderate to severe foraminal stenosis bilaterally at L4-L5 and L5-S1.    PARASPINAL: No extraspinal abnormality.      Impression    IMPRESSION:  HEAD CT:  1.  No acute intracranial process.  2.  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  3.  Prominent atrophy for age.    CERVICAL SPINE CT:  1.  No fracture or posttraumatic subluxation.  2.  No high-grade spinal canal or neural foraminal stenosis.    THORACIC SPINE CT:  1.  Age-indeterminate severe compression deformities of T7 and T11.  2.  Age-indeterminate moderate to severe compression deformities of T5 and T9.  3.  Age-indeterminate moderate compression fracture of T6.   4.  Age-indeterminate mild compression deformities involving the superior endplates of T2-T4.  5.  No high-grade central canal stenosis or neural foraminal narrowing.    LUMBAR SPINE CT:  1.  Age-indeterminate compression fractures of  L1, L3 and L4 as described above.  2.  Moderate to severe foraminal stenosis bilaterally at L4-L5 and L5-S1.  3.  Healing left-sided transverse process fractures at L1 and L2.   CT Chest/Abdomen/Pelvis w Contrast   Result Value    Radiologist flags Enlarged lymph node    Narrative    CT of the Chest, Abdomen and Pelvis with contrast, 10/10/2021 11:01  PM.    Comparison: Chest x-ray 12/20/2016.    History: pain, confusion, tachycardia in setting of fall.     Technique: Axial images of the chest, abdomen and pelvis were obtained  with contrast. Coronal reconstructions were provided. Images were  reviewed in bone, lung, and soft tissue windows.     Findings:    Chest:  The thyroid gland is normal. Heart size is normal. No pericardial  effusion. No significant coronary artery calcifications. Thoracic  aorta Main pulmonary arteries are normal in caliber. Conventional  three-vessel branching pattern of the aortic arch.    No mediastinal or axillary adenopathy. Esophagus is normal.    Trachea and central airways are clear. Upper lobe predominant  peribronchovascular groundglass opacities. Bibasilar dependent  atelectasis. No pleural effusion or pneumothorax.    No suspicious or enlarged pulmonary nodules.    Abdomen and Pelvis:   Hepatic steatosis.  No focal enhancing lesion. Post surgical  cholecystectomy changes. Spleen size within normal limits. Homogenous  enhancement of the pancreas. The main pancreatic duct is not dilated.  Prominence of the common bile duct measuring up to 10 mm, likely  secondary to reservoir effect.    No adrenal mass. Symmetric renal enhancement. No hydronephrosis or  calcified stone. Bladder is normal. Prostate is normal.    Stomach is unremarkable. No small or large bowel wall thickening or  dilation. Appendix is normal. No free intraperitoneal air. No  intra-abdominal or pelvic free fluid. No pneumatosis or portal venous  gas. Scattered colonic diverticulosis without additional evidence  to  suggest acute diverticulitis.    Abdominal aorta and major branches are normal in caliber and patent  with trace atherosclerotic calcifications. The main portal vein and  major branches are patent.    No mesenteric, retroperitoneal, or pelvic lymphadenopathy.    Bones and Soft Tissues:   No suspicious osseous lesion. No suspicious mass. Old sternal and  bilateral rib fractures. Multilevel degenerative changes of the  thoracolumbar spine. Age indeterminate multilevel moderate to severe  wedge compression deformities of the thoracolumbar spine.      Impression    Impression:   1.  Upper lobe predominant peribronchovascular groundglass opacities  concerning for acute infectious or inflammatory process.  2.  Age indeterminate multilevel moderate to severe wedge compression  deformities of the thoracolumbar spine.  Please see same date CT spin  series for additional findings.  3.  Colonic diverticulosis without additional evidence to suggest  acute diverticulitis.  4.  Hepatic steatosis.    I have personally reviewed the examination and initial interpretation  and I agree with the findings.    LORRAINE MARTINEZ MD         SYSTEM ID:  [id number]   CT Thoracic Spine w/o Contrast    Narrative    EXAM: CT HEAD W/O CONTRAST, CT LUMBAR SPINE W/O CONTRAST, CT CERVICAL SPINE W/O CONTRAST, CT THORACIC SPINE W/O CONTRAST  LOCATION: [facility]  DATE/TIME: 10/10/2021 10:56 PM    INDICATION: Fall, confusion. Pain. Tripped and fell.  COMPARISON: None.  TECHNIQUE:   1) Routine CT Head without IV contrast. Multiplanar reformats. Dose reduction techniques were used.   2) Routine CT Cervical Spine without IV contrast. Multiplanar reformats. Dose reduction techniques were used.   3) Routine CT Thoracic Spine without IV contrast. Multiplanar reformats. Dose reduction techniques were used.   4) Routine CT Lumbar Spine without IV contrast. Multiplanar reformats. Dose reduction techniques were used.      FINDINGS:   HEAD CT:   INTRACRANIAL CONTENTS: No intracranial hemorrhage, extraaxial collection, or mass effect. No CT evidence of acute infarct. 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 Moderate generalized volume loss. No hydrocephalus.     VISUALIZED ORBITS/SINUSES/MASTOIDS: No intraorbital abnormality. No paranasal sinus mucosal disease. No middle ear or mastoid effusion.    BONES/SOFT TISSUES: No acute abnormality.    CERVICAL SPINE CT:  VERTEBRA: Mild dextroscoliosis. Normal vertebral body heights and alignment. No fracture or posttraumatic subluxation.     CANAL/FORAMINA: No canal or neural foraminal stenosis.    PARASPINAL: No extraspinal abnormality. Visualized lung fields are clear.    THORACIC SPINE CT:  VERTEBRA: Mild age-indeterminate compression deformities involving the superior endplate of T2-T4. Moderate compression deformities of T5 and to a lesser extent T6. Severe compression fracture of T7 with slight buckling of the posterior cortex into the   ventral canal. Moderate to severe compression fracture of T9 with minimal buckling of the posterior cortex into the ventral canal. Severe compression fracture of T11 with slight buckling of the posterior cortex into the ventral canal. Chronic fracture of   the posterior left third rib.     CANAL/FORAMINA: No canal or neural foraminal stenosis.    PARASPINAL: No extraspinal abnormality.    LUMBAR SPINE CT:  VERTEBRA: 5 lumbar type vertebra. Moderate age-indeterminate burst fracture of L1 with up to 60% loss of height centrally. 5 mm retropulsion of the posterior cortex into the ventral canal. Moderate to severe compression fracture involving the central   superior and inferior endplates of L3 and inferior endplate of L4. Healing fractures involving the left L1 and L2 transverse processes.     CANAL/FORAMINA: No high-grade central canal stenosis and mild central canal stenosis at L1. Moderate to severe foraminal  stenosis bilaterally at L4-L5 and L5-S1.    PARASPINAL: No extraspinal abnormality.      Impression    IMPRESSION:  HEAD CT:  1.  No acute intracranial process.  2.  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  3.  Prominent atrophy for age.    CERVICAL SPINE CT:  1.  No fracture or posttraumatic subluxation.  2.  No high-grade spinal canal or neural foraminal stenosis.    THORACIC SPINE CT:  1.  Age-indeterminate severe compression deformities of T7 and T11.  2.  Age-indeterminate moderate to severe compression deformities of T5 and T9.  3.  Age-indeterminate moderate compression fracture of T6.   4.  Age-indeterminate mild compression deformities involving the superior endplates of T2-T4.  5.  No high-grade central canal stenosis or neural foraminal narrowing.    LUMBAR SPINE CT:  1.  Age-indeterminate compression fractures of L1, L3 and L4 as described above.  2.  Moderate to severe foraminal stenosis bilaterally at L4-L5 and L5-S1.  3.  Healing left-sided transverse process fractures at L1 and L2.   Lumbar spine CT w/o contrast    Narrative    EXAM: CT HEAD W/O CONTRAST, CT LUMBAR SPINE W/O CONTRAST, CT CERVICAL SPINE W/O CONTRAST, CT THORACIC SPINE W/O CONTRAST  LOCATION: [facility]  DATE/TIME: 10/10/2021 10:56 PM    INDICATION: Fall, confusion. Pain. Tripped and fell.  COMPARISON: None.  TECHNIQUE:   1) Routine CT Head without IV contrast. Multiplanar reformats. Dose reduction techniques were used.   2) Routine CT Cervical Spine without IV contrast. Multiplanar reformats. Dose reduction techniques were used.   3) Routine CT Thoracic Spine without IV contrast. Multiplanar reformats. Dose reduction techniques were used.   4) Routine CT Lumbar Spine without IV contrast. Multiplanar reformats. Dose reduction techniques were used.     FINDINGS:   HEAD CT:   INTRACRANIAL CONTENTS: No intracranial hemorrhage, extraaxial collection, or  mass effect. No CT evidence of acute infarct. 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 Moderate generalized volume loss. No hydrocephalus.     VISUALIZED ORBITS/SINUSES/MASTOIDS: No intraorbital abnormality. No paranasal sinus mucosal disease. No middle ear or mastoid effusion.    BONES/SOFT TISSUES: No acute abnormality.    CERVICAL SPINE CT:  VERTEBRA: Mild dextroscoliosis. Normal vertebral body heights and alignment. No fracture or posttraumatic subluxation.     CANAL/FORAMINA: No canal or neural foraminal stenosis.    PARASPINAL: No extraspinal abnormality. Visualized lung fields are clear.    THORACIC SPINE CT:  VERTEBRA: Mild age-indeterminate compression deformities involving the superior endplate of T2-T4. Moderate compression deformities of T5 and to a lesser extent T6. Severe compression fracture of T7 with slight buckling of the posterior cortex into the   ventral canal. Moderate to severe compression fracture of T9 with minimal buckling of the posterior cortex into the ventral canal. Severe compression fracture of T11 with slight buckling of the posterior cortex into the ventral canal. Chronic fracture of   the posterior left third rib.     CANAL/FORAMINA: No canal or neural foraminal stenosis.    PARASPINAL: No extraspinal abnormality.    LUMBAR SPINE CT:  VERTEBRA: 5 lumbar type vertebra. Moderate age-indeterminate burst fracture of L1 with up to 60% loss of height centrally. 5 mm retropulsion of the posterior cortex into the ventral canal. Moderate to severe compression fracture involving the central   superior and inferior endplates of L3 and inferior endplate of L4. Healing fractures involving the left L1 and L2 transverse processes.     CANAL/FORAMINA: No high-grade central canal stenosis and mild central canal stenosis at L1. Moderate to severe foraminal stenosis bilaterally at L4-L5 and L5-S1.    PARASPINAL: No extraspinal abnormality.      Impression     IMPRESSION:  HEAD CT:  1.  No acute intracranial process.  2.  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  3.  Prominent atrophy for age.    CERVICAL SPINE CT:  1.  No fracture or posttraumatic subluxation.  2.  No high-grade spinal canal or neural foraminal stenosis.    THORACIC SPINE CT:  1.  Age-indeterminate severe compression deformities of T7 and T11.  2.  Age-indeterminate moderate to severe compression deformities of T5 and T9.  3.  Age-indeterminate moderate compression fracture of T6.   4.  Age-indeterminate mild compression deformities involving the superior endplates of T2-T4.  5.  No high-grade central canal stenosis or neural foraminal narrowing.    LUMBAR SPINE CT:  1.  Age-indeterminate compression fractures of L1, L3 and L4 as described above.  2.  Moderate to severe foraminal stenosis bilaterally at L4-L5 and L5-S1.  3.  Healing left-sided transverse process fractures at L1 and L2.       LABS:   Last Comprehensive Metabolic Panel:  Sodium   Date Value Ref Range Status   10/10/2021 137 133 - 144 mmol/L Final   06/23/2018 137 133 - 144 mmol/L Final     Sodium POCT   Date Value Ref Range Status   10/10/2021 140 133 - 144 mmol/L Final     Potassium   Date Value Ref Range Status   10/11/2021 3.4 3.4 - 5.3 mmol/L Final     Comment:     Specimen slightly hemolyzed, potassium may be falsely elevated.   06/23/2018 3.2 (L) 3.4 - 5.3 mmol/L Final     Potassium POCT   Date Value Ref Range Status   10/10/2021 2.9 (L) 3.4 - 5.3 mmol/L Final     Chloride   Date Value Ref Range Status   10/10/2021 104 94 - 109 mmol/L Final   06/23/2018 101 94 - 109 mmol/L Final     Carbon Dioxide   Date Value Ref Range Status   06/23/2018 26 20 - 32 mmol/L Final     Carbon Dioxide (CO2)   Date Value Ref Range Status   10/10/2021 16 (L) 20 - 32 mmol/L Final     Anion Gap   Date Value Ref Range Status   10/10/2021 17 (H) 3 - 14 mmol/L Final   06/23/2018 11 3 - 14 mmol/L Final     Glucose   Date  Value Ref Range Status   10/10/2021 112 (H) 70 - 99 mg/dL Final   06/23/2018 99 70 - 99 mg/dL Final     GLUCOSE BY METER POCT   Date Value Ref Range Status   10/10/2021 147 (H) 70 - 99 mg/dL Final     Glucose Whole Blood POCT   Date Value Ref Range Status   10/10/2021 123 (H) 70 - 99 mg/dL Final     Urea Nitrogen   Date Value Ref Range Status   10/10/2021 8 7 - 30 mg/dL Final   06/23/2018 9 7 - 30 mg/dL Final     Creatinine   Date Value Ref Range Status   10/10/2021 0.57 (L) 0.66 - 1.25 mg/dL Final   06/23/2018 0.61 (L) 0.66 - 1.25 mg/dL Final     Creatinine POCT   Date Value Ref Range Status   10/10/2021 0.6 (L) 0.7 - 1.3 mg/dL Final     GFR Estimate   Date Value Ref Range Status   10/10/2021 >90 >60 mL/min/1.73m2 Final     Comment:     As of July 11, 2021, eGFR is calculated by the CKD-EPI creatinine equation, without race adjustment. eGFR can be influenced by muscle mass, exercise, and diet. The reported eGFR is an estimation only and is only applicable if the renal function is stable.   06/23/2018 >90 >60 mL/min/1.7m2 Final     Comment:     Non  GFR Calc     GFR, ESTIMATED POCT   Date Value Ref Range Status   10/10/2021 >60 >60 mL/min/1.73m2 Final     Calcium   Date Value Ref Range Status   10/10/2021 8.8 8.5 - 10.1 mg/dL Final   06/23/2018 7.9 (L) 8.5 - 10.1 mg/dL Final     Lab Results   Component Value Date    WBC 11.1 10/10/2021    WBC 6.6 06/23/2018     Lab Results   Component Value Date    RBC 4.72 10/10/2021    RBC 4.65 06/23/2018     Lab Results   Component Value Date    HGB 15.5 10/10/2021    HGB 16.0 10/10/2021    HGB 13.8 06/23/2018     Lab Results   Component Value Date    HCT 47.2 10/10/2021    HCT 47 10/10/2021    HCT 42.2 06/23/2018     Lab Results   Component Value Date     10/10/2021    MCV 91 06/23/2018     Lab Results   Component Value Date    MCH 32.8 10/10/2021    MCH 29.7 06/23/2018     Lab Results   Component Value Date    MCHC 32.8 10/10/2021    MCHC 32.7 06/23/2018      Lab Results   Component Value Date    RDW 14.1 10/10/2021    RDW 14.8 06/23/2018     Lab Results   Component Value Date     10/10/2021     06/23/2018     INR   Date Value Ref Range Status   10/10/2021 1.25 (H) 0.85 - 1.15 Final     Comment:     Effective 7/11/2021, the reference range for this assay has changed.   06/23/2018 1.24 (H) 0.86 - 1.14 Final      No results found for: PTT   @Fibrinogen1@    ASSESSMENT:  56M, PMH AA, Seizure disorder, multiple compression fxs, intact on exam.     In addition to the assessment of diagnoses detailed above, this 56 year old male  patient admitted from the Emergency Department has the following conditions contributing to the complexity of their medical care:    Encephalopathy, unspecified,  Arrhythmia including atrial fibrillation,  Chronic liver disease,    RECOMMENDATIONS:  No neurosurgical intervention indicated at this time   Recommend keeping C collar in place until can be cleared from a mental status standpoint  TLSO; T+L spine precautions until able to receive  Can wear his TLSO from home as well.   Endocrinology consultation for osteoporosis management/optimization evident on CT  [place] [facility] [place] [facility] [place]    Neurosurgery to follow peripherally      Tone Fonseca MD  Neurosurgery Resident, PGY-2    The patient was discussed with Dr. Cruz, neurosurgery chief resident, and Dr. Gee, neurosurgery staff.    [unfilled]

## 2021-10-11 NOTE — ED NOTES
Pt in room three on monitor. RT in room with INGE Pierre and MD. Labs sent, CT ordered. Pt transported to CT.

## 2021-10-11 NOTE — PROGRESS NOTES
"Neurology Consult Progress Note    Summary: Mario Lamar II is a 56 year old male with history of depression, EtOH use, EtOH withdrawal and seizures admitted on 10/10/21 for a fall and two witnessed seizures in the ED possibly related to EtOH withdrawal. Neurology consulted for seizure evaluation. Active issues include possible EtOH withdrawal seizures.    Interval events: no acute events overnight. He is feeling well this morning and is without any complaints.     Objective:  Physical Examination   Vitals: /65   Pulse 80   Temp 98.7  F (37.1  C) (Oral)   Resp 22   Ht 1.727 m (5' 8\")   Wt 64.9 kg (143 lb 1.3 oz)   SpO2 96%   BMI 21.76 kg/m    General: Patient lying in bed, NAD  HEENT: NC/AT, no icterus, moist mucous membranes  Cardiac: RRR  Chest: non-labored on RA  Abdomen: S/NT/ND  Extremities: Warm, no edema  Skin: No rash or lesion   Psych: Affect appropriate for situation   Neuro:  Mental status: Awake, alert, attentive, oriented to self and place. He is not  Oriented to time or circumstance. He is unable to state the correct month and does not have memory of evens leading up to  hospitalization. Follows simple commands  Cranial nerves: PERRL, conjugate gaze, EOMI, visual fields intact, face symmetric, shoulder shrug strong, tongue protrusion/uvula midline, no dysarthria.   Motor: Normal muscle bulk and tone. No abnormal movements or tremor. 5/5 strength in 4/4 extremities.   Reflexes: normoreflexic and symmetric biceps, brachioradialis, triceps, patellae, and achilles. No clonus, toes down-going.  Sensory: Intact to light touch, pin, vibration, and proprioception  Coordination: FNF without ataxia or dysmetria.   Gait: not assessed        Pertinent Labs:  Last Comprehensive Metabolic Panel:  Sodium   Date Value Ref Range Status   10/11/2021 139 133 - 144 mmol/L Final   06/23/2018 137 133 - 144 mmol/L Final     Potassium   Date Value Ref Range Status   10/11/2021 3.0 (L) 3.4 - 5.3 " mmol/L Final   06/23/2018 3.2 (L) 3.4 - 5.3 mmol/L Final     Chloride   Date Value Ref Range Status   10/11/2021 108 94 - 109 mmol/L Final   06/23/2018 101 94 - 109 mmol/L Final     Carbon Dioxide   Date Value Ref Range Status   06/23/2018 26 20 - 32 mmol/L Final     Carbon Dioxide (CO2)   Date Value Ref Range Status   10/11/2021 24 20 - 32 mmol/L Final     Anion Gap   Date Value Ref Range Status   10/11/2021 7 3 - 14 mmol/L Final   06/23/2018 11 3 - 14 mmol/L Final     Glucose   Date Value Ref Range Status   10/11/2021 84 70 - 99 mg/dL Final   06/23/2018 99 70 - 99 mg/dL Final     Urea Nitrogen   Date Value Ref Range Status   10/11/2021 6 (L) 7 - 30 mg/dL Final   06/23/2018 9 7 - 30 mg/dL Final     Creatinine   Date Value Ref Range Status   10/11/2021 0.47 (L) 0.66 - 1.25 mg/dL Final   06/23/2018 0.61 (L) 0.66 - 1.25 mg/dL Final     GFR Estimate   Date Value Ref Range Status   10/11/2021 >90 >60 mL/min/1.73m2 Final     Comment:     As of July 11, 2021, eGFR is calculated by the CKD-EPI creatinine equation, without race adjustment. eGFR can be influenced by muscle mass, exercise, and diet. The reported eGFR is an estimation only and is only applicable if the renal function is stable.   06/23/2018 >90 >60 mL/min/1.7m2 Final     Comment:     Non  GFR Calc     GFR, ESTIMATED POCT   Date Value Ref Range Status   10/10/2021 >60 >60 mL/min/1.73m2 Final     Calcium   Date Value Ref Range Status   10/11/2021 8.0 (L) 8.5 - 10.1 mg/dL Final   06/23/2018 7.9 (L) 8.5 - 10.1 mg/dL Final     Bilirubin Total   Date Value Ref Range Status   10/11/2021 1.9 (H) 0.2 - 1.3 mg/dL Final   06/23/2018 1.0 0.2 - 1.3 mg/dL Final     Alkaline Phosphatase   Date Value Ref Range Status   10/11/2021 250 (H) 40 - 150 U/L Final   06/23/2018 159 (H) 40 - 150 U/L Final     ALT   Date Value Ref Range Status   10/11/2021 46 0 - 70 U/L Final   06/23/2018 22 0 - 70 U/L Final     AST   Date Value Ref Range Status   10/11/2021 106 (H) 0 -  45 U/L Final   06/23/2018 54 (H) 0 - 45 U/L Final       CBC RESULTS: Recent Labs   Lab Test 10/11/21  0629   WBC 8.4   RBC 3.72*   HGB 12.2*   HCT 36.0*   MCV 97   MCH 32.8   MCHC 33.9   RDW 13.9   *     INR   Date Value Ref Range Status   10/11/2021 1.33 (H) 0.85 - 1.15 Final     Comment:     Effective 7/11/2021, the reference range for this assay has changed.   06/23/2018 1.24 (H) 0.86 - 1.14 Final        Imaging:  HEAD CT:  1.  No acute intracranial process.  2.  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  3.  Prominent atrophy for age.     CERVICAL SPINE CT:  1.  No fracture or posttraumatic subluxation.  2.  No high-grade spinal canal or neural foraminal stenosis.     THORACIC SPINE CT:  1.  Age-indeterminate severe compression deformities of T7 and T11.  2.  Age-indeterminate moderate to severe compression deformities of T5 and T9.  3.  Age-indeterminate moderate compression fracture of T6.   4.  Age-indeterminate mild compression deformities involving the superior endplates of T2-T4.  5.  No high-grade central canal stenosis or neural foraminal narrowing.     LUMBAR SPINE CT:  1.  Age-indeterminate compression fractures of L1, L3 and L4 as described above.  2.  Moderate to severe foraminal stenosis bilaterally at L4-L5 and L5-S1.  3.  Healing left-sided transverse process fractures at L1 and L2.    EKG 10/11/21: Sinus rhythm, normal ECG when compared to previous  EKG 10/10/21: atrial fibrillation with RVR    Echo 10/11/21: in process    Routine EEG 10/11/21: in process    Impression & Recommendations:  Mario Lamar II is a 56 year old male with history of depression, PTSD, and EtOH abuse admitted on 10/10/21 for a fall and two witnessed seizures possibly related to EtOH withdrawal. Neurology consulted for seizure evaluation. He does not appear to be actively withdrawing and has not had any subsequent seizures since admission. At this time Neurology  recommendations are to start Keppra 750mg BID and follow up with Neurology outpatient. A routine EEG has been ordered to assess for seizure activity and seizure risk. If EEG is without concern patient is okay for discharge per Neurology.    - CIWA protocol and BZDs for withdrawal symptoms  - high dose thiamine 500 mg IV over 30 min TID for 2 days then 250 mg IV daily for 5 days    (see wernicke order set)    - Start Keppra 750 BID and follow up in Neurology Clinic  - Routine 30 min EEG to assess for seizure activity and seizure risk, if reassuring neurology will sign off    Patient seen and discussed with attending Dr. Britton.    Rosales Blancas, MS4    I was present with the medical student who participated in the service and in the documentation of the note. I have verified the history and personally performed the physical exam and medical decision making. I agree with the assessment and plan of care as documented in the note    Lisbeth Dolan DO  Neurology Resident, PGY-4  Pager: [phone]

## 2021-10-11 NOTE — PROGRESS NOTES
Brief trauma note  I did not formally see this patient today. Was admitted to with age indeterminate Thoracic fractures. Witnessed seizure in ED.  Medicine primary, Neurosurgery, Neurology consults.  Will complete a tertiary exam tomorrow 10/12/21    Joel Contreras CNP  Primary team: Trauma Services   Job code pager [phone] (24 hours a day)  Use [employer] to text page (not text page compatible with [email or web address]).    Dial * * * [phone] then  [phone], wait for prompt and then enter call back number.

## 2021-10-11 NOTE — ED NOTES
[facility]   ED Nurse to Floor Handoff     Mario Lamar II is a 56 year old male who speaks English and lives unknown,  home status is unknown  They arrived in the ED [facility]    ED Chief Complaint: Fall    ED Dx;   Final diagnoses:   Seizure (H)   Atrial fibrillation, unspecified type (H)   Fall, initial encounter         Needed?: No    Allergies: No Known Allergies.  Past Medical Hx:   Past Medical History:   Diagnosis Date     Depression      Pancreatitis      PTSD (post-traumatic stress disorder)      Seizures (H)     withdrawl     Substance abuse (H)     alcohol abuse      Baseline Mental status: WDL  Current Mental Status changes: other some disorientation    Infection present or suspected this encounter: no  Sepsis suspected: No  Isolation type: No active isolations  Patient tested for COVID 19 prior to admission: YES     Activity level - Baseline/Home:  Independent  Activity Level - Current:   have not had out of bed    Bariatric equipment needed?: No    In the ED these meds were given:   Medications   lidocaine 1 % 0.1-1 mL (has no administration in time range)   lidocaine (LMX4) cream (has no administration in time range)   sodium chloride (PF) 0.9% PF flush 3 mL (has no administration in time range)   sodium chloride (PF) 0.9% PF flush 3 mL (has no administration in time range)   melatonin tablet 1 mg (has no administration in time range)   senna-docusate (SENOKOT-S/PERICOLACE) 8.6-50 MG per tablet 1 tablet (has no administration in time range)     Or   senna-docusate (SENOKOT-S/PERICOLACE) 8.6-50 MG per tablet 2 tablet (has no administration in time range)   polyethylene glycol (MIRALAX) Packet 17 g (has no administration in time range)   acetaminophen (TYLENOL) tablet 650 mg (has no administration in time range)     Or   acetaminophen (TYLENOL) Suppository 650 mg (has no administration in time range)   diazepam  (VALIUM) tablet 10 mg (has no administration in time range)   sodium chloride 0.9 % 1,000 mL with Infuvite Adult 10 mL, thiamine 100 mg, folic acid 1 mg infusion (has no administration in time range)   thiamine (B-1) tablet 100 mg (has no administration in time range)   folic acid (FOLVITE) tablet 1 mg (has no administration in time range)   multivitamin w/minerals (THERA-VIT-M) tablet 1 tablet (has no administration in time range)   lactated ringers BOLUS 500 mL (0 mLs Intravenous Stopped 10/11/21 0054)   thiamine (B-1) injection 100 mg (100 mg Intravenous Given 10/11/21 0013)   folic acid injection 1 mg (1 mg Intravenous Given 10/11/21 0010)   iopamidol (ISOVUE-370) solution 100 mL (100 mLs Intravenous Given 10/10/21 2239)   sodium chloride (PF) 0.9% PF flush 80 mL (80 mLs Intravenous Given 10/10/21 2239)   LORazepam (ATIVAN) 2 MG/ML injection (1 mg  Given 10/10/21 2256)   levETIRAcetam (KEPPRA) 2,000 mg in sodium chloride 0.9 % 250 mL intermittent infusion (0 mg Intravenous Stopped 10/11/21 0054)   cefTRIAXone (ROCEPHIN) 1 g vial to attach to  mL bag for ADULTS or NS 50 mL bag for PEDS (0 g Intravenous Stopped 10/11/21 0257)   potassium chloride 10 mEq in 100 mL sterile water intermittent infusion (premix) (0 mEq Intravenous Stopped 10/11/21 0515)   potassium chloride 10 mEq in 100 mL sterile water intermittent infusion (premix) (0 mEq Intravenous Stopped 10/11/21 0400)       Drips running?  No    Home pump  No    Current LDAs  Peripheral IV 10/10/21 Left Lower forearm (Active)   Number of days: 1       Labs results:   Labs Ordered and Resulted from Time of ED Arrival Up to the Time of Departure from the ED   INR - Abnormal; Notable for the following components:       Result Value    INR 1.25 (*)     All other components within normal limits   COMPREHENSIVE METABOLIC PANEL - Abnormal; Notable for the following components:    Potassium 2.9 (*)     Carbon Dioxide (CO2) 16 (*)     Anion Gap 17 (*)      Creatinine 0.57 (*)     Glucose 112 (*)     Alkaline Phosphatase 350 (*)      (*)     ALT 79 (*)     Protein Total 9.8 (*)     Bilirubin Total 1.8 (*)     All other components within normal limits   TSH WITH FREE T4 REFLEX - Abnormal; Notable for the following components:    TSH 5.04 (*)     All other components within normal limits   ACETAMINOPHEN LEVEL - Abnormal; Notable for the following components:    Acetaminophen <2 (*)     All other components within normal limits   CBC WITH PLATELETS AND DIFFERENTIAL - Abnormal; Notable for the following components:    WBC Count 11.1 (*)     Absolute Immature Granulocytes 0.1 (*)     All other components within normal limits   GLUCOSE BY METER - Abnormal; Notable for the following components:    GLUCOSE BY METER POCT 147 (*)     All other components within normal limits   ISTAT GASES ELECTROLYTES ICA GLUCOSE VENOUS POCT - Abnormal; Notable for the following components:    Glucose Whole Blood POCT 123 (*)     Bicarbonate Venous POCT 16 (*)     Potassium POCT 2.9 (*)     pO2 Venous POCT 70 (*)     pH Venous POCT 7.22 (*)     O2 Sat, Venous POCT 90 (*)     All other components within normal limits   ISTAT CREATININE POCT - Abnormal; Notable for the following components:    Creatinine POCT 0.6 (*)     All other components within normal limits   ISTAT GASES LACTATE VENOUS POCT - Abnormal; Notable for the following components:    Lactic Acid POCT 12.1 (*)     Bicarbonate Venous POCT 17 (*)     O2 Sat, Venous POCT 91 (*)     pH Venous POCT 7.21 (*)     pO2 Venous POCT 72 (*)     All other components within normal limits   BLOOD GAS VENOUS - Abnormal; Notable for the following components:    pH Venous 7.45 (*)     pCO2 Venous 36 (*)     pO2 Venous 54 (*)     All other components within normal limits   COMPREHENSIVE METABOLIC PANEL - Abnormal; Notable for the following components:    Potassium 3.0 (*)     All other components within normal limits   BILIRUBIN DIRECT -  Abnormal; Notable for the following components:    Bilirubin Direct 0.9 (*)     All other components within normal limits   CBC WITH PLATELETS AND DIFFERENTIAL - Abnormal; Notable for the following components:    RBC Count 3.72 (*)     Hemoglobin 12.2 (*)     Hematocrit 36.0 (*)     Platelet Count 103 (*)     All other components within normal limits   MAGNESIUM - Normal   TROPONIN I - Normal   ETHYL ALCOHOL LEVEL - Normal   LIPASE - Normal   SALICYLATE LEVEL - Normal   COVID-19 VIRUS (CORONAVIRUS) BY PCR - Normal    Narrative:     Testing was performed using the Xpert Xpress SARS-CoV-2 Assay on the  Cepheid Gene-Xpert Instrument Systems. Additional information about  this Emergency Use Authorization (EUA) assay can be found via the Lab  Guide. This test should be ordered for the detection of SARS-CoV-2 in  individuals who meet SARS-CoV-2 clinical and/or epidemiological  criteria. Test performance is unknown in asymptomatic patients. This  test is for in vitro diagnostic use under the FDA EUA for  laboratories certified under CLIA to perform high complexity testing.  This test has not been FDA cleared or approved. A negative result  does not rule out the presence of PCR inhibitors in the specimen or  target RNA in concentration below the limit of detection for the  assay. The possibility of a false negative should be considered if  the patient's recent exposure or clinical presentation suggests  COVID-19. This test was validated by the [facility]. This laboratory is certified under  the Clinical Laboratory Improvement Amendments of 1988 (CLIA-88) as  qualified to perform high complexity laboratory testing.     T4 FREE - Normal   POTASSIUM - Normal   LACTIC ACID WHOLE BLOOD - Normal   MAGNESIUM - Normal   CREATININE POCT - Normal   GLUCOSE MONITOR NURSING POCT   EXTRA PURPLE TOP TUBE   ROUTINE UA WITH MICROSCOPIC REFLEX TO CULTURE   AMMONIA   INR   PHOSPHORUS   ISTAT  CG4 GASES LACTATE ALVARO NURSING POCT   CARDIAC CONTINUOUS MONITORING   ISTAT CG8 GAS ELEC ICA GLUC ALVARO NURSE POCT   CIWA-AR SCALE AND VS   IP ASSIGN PROVIDER TEAM TO TREATMENT TEAM   PERIPHERAL IV CATHETER   VITAL SIGNS   PULSE OXIMETRY NURSING   TELEMETRY MONITORING MED/SURG   INTAKE AND OUTPUT   NEURO CHECKS   APPLY PNEUMATIC COMPRESSION DEVICE (PCD)   PATIENT CARE ORDER   CIWA-AR SCALE AND VS   ASSESS SUICIDALITY   NOTIFY PROVIDER   NOTIFY PROVIDER   ORTHOSTATIC BLOOD PRESSURE AND PULSE   BLOOD CULTURE   BLOOD CULTURE   CBC WITH PLATELETS & DIFFERENTIAL    Narrative:     The following orders were created for panel order CBC with platelets differential.  Procedure                               Abnormality         Status                     ---------                               -----------         ------                     CBC with platelets and d...[[id number]]  Abnormal            Final result                 Please view results for these tests on the individual orders.   EXTRA TUBE    Narrative:     The following orders were created for panel order [place] Draw.  Procedure                               Abnormality         Status                     ---------                               -----------         ------                     Extra Purple Top Tube[[id number]]                            Final result                 Please view results for these tests on the individual orders.   CBC WITH PLATELETS & DIFFERENTIAL    Narrative:     The following orders were created for panel order CBC with Platelets & Differential.  Procedure                               Abnormality         Status                     ---------                               -----------         ------                     CBC with platelets and d...[[id number]]  Abnormal            Final result                 Please view results for these tests on the individual orders.   URINE DRUGS OF ABUSE SCREEN       Imaging Studies:   Recent Results (from  the past 24 hour(s))   Head CT w/o contrast    Narrative    EXAM: CT HEAD W/O CONTRAST, CT LUMBAR SPINE W/O CONTRAST, CT CERVICAL SPINE W/O CONTRAST, CT THORACIC SPINE W/O CONTRAST  LOCATION: [facility]  DATE/TIME: 10/10/2021 10:56 PM    INDICATION: Fall, confusion. Pain. Tripped and fell.  COMPARISON: None.  TECHNIQUE:   1) Routine CT Head without IV contrast. Multiplanar reformats. Dose reduction techniques were used.   2) Routine CT Cervical Spine without IV contrast. Multiplanar reformats. Dose reduction techniques were used.   3) Routine CT Thoracic Spine without IV contrast. Multiplanar reformats. Dose reduction techniques were used.   4) Routine CT Lumbar Spine without IV contrast. Multiplanar reformats. Dose reduction techniques were used.     FINDINGS:   HEAD CT:   INTRACRANIAL CONTENTS: No intracranial hemorrhage, extraaxial collection, or mass effect. No CT evidence of acute infarct. 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 Moderate generalized volume loss. No hydrocephalus.     VISUALIZED ORBITS/SINUSES/MASTOIDS: No intraorbital abnormality. No paranasal sinus mucosal disease. No middle ear or mastoid effusion.    BONES/SOFT TISSUES: No acute abnormality.    CERVICAL SPINE CT:  VERTEBRA: Mild dextroscoliosis. Normal vertebral body heights and alignment. No fracture or posttraumatic subluxation.     CANAL/FORAMINA: No canal or neural foraminal stenosis.    PARASPINAL: No extraspinal abnormality. Visualized lung fields are clear.    THORACIC SPINE CT:  VERTEBRA: Mild age-indeterminate compression deformities involving the superior endplate of T2-T4. Moderate compression deformities of T5 and to a lesser extent T6. Severe compression fracture of T7 with slight buckling of the posterior cortex into the   ventral canal. Moderate to severe compression fracture of T9 with minimal buckling of the posterior cortex into the ventral  canal. Severe compression fracture of T11 with slight buckling of the posterior cortex into the ventral canal. Chronic fracture of   the posterior left third rib.     CANAL/FORAMINA: No canal or neural foraminal stenosis.    PARASPINAL: No extraspinal abnormality.    LUMBAR SPINE CT:  VERTEBRA: 5 lumbar type vertebra. Moderate age-indeterminate burst fracture of L1 with up to 60% loss of height centrally. 5 mm retropulsion of the posterior cortex into the ventral canal. Moderate to severe compression fracture involving the central   superior and inferior endplates of L3 and inferior endplate of L4. Healing fractures involving the left L1 and L2 transverse processes.     CANAL/FORAMINA: No high-grade central canal stenosis and mild central canal stenosis at L1. Moderate to severe foraminal stenosis bilaterally at L4-L5 and L5-S1.    PARASPINAL: No extraspinal abnormality.      Impression    IMPRESSION:  HEAD CT:  1.  No acute intracranial process.  2.  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  3.  Prominent atrophy for age.    CERVICAL SPINE CT:  1.  No fracture or posttraumatic subluxation.  2.  No high-grade spinal canal or neural foraminal stenosis.    THORACIC SPINE CT:  1.  Age-indeterminate severe compression deformities of T7 and T11.  2.  Age-indeterminate moderate to severe compression deformities of T5 and T9.  3.  Age-indeterminate moderate compression fracture of T6.   4.  Age-indeterminate mild compression deformities involving the superior endplates of T2-T4.  5.  No high-grade central canal stenosis or neural foraminal narrowing.    LUMBAR SPINE CT:  1.  Age-indeterminate compression fractures of L1, L3 and L4 as described above.  2.  Moderate to severe foraminal stenosis bilaterally at L4-L5 and L5-S1.  3.  Healing left-sided transverse process fractures at L1 and L2.   Cervical spine CT w/o contrast    Narrative    EXAM: CT HEAD W/O CONTRAST, CT LUMBAR SPINE W/O  CONTRAST, CT CERVICAL SPINE W/O CONTRAST, CT THORACIC SPINE W/O CONTRAST  LOCATION: [facility]  DATE/TIME: 10/10/2021 10:56 PM    INDICATION: Fall, confusion. Pain. Tripped and fell.  COMPARISON: None.  TECHNIQUE:   1) Routine CT Head without IV contrast. Multiplanar reformats. Dose reduction techniques were used.   2) Routine CT Cervical Spine without IV contrast. Multiplanar reformats. Dose reduction techniques were used.   3) Routine CT Thoracic Spine without IV contrast. Multiplanar reformats. Dose reduction techniques were used.   4) Routine CT Lumbar Spine without IV contrast. Multiplanar reformats. Dose reduction techniques were used.     FINDINGS:   HEAD CT:   INTRACRANIAL CONTENTS: No intracranial hemorrhage, extraaxial collection, or mass effect. No CT evidence of acute infarct. 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 Moderate generalized volume loss. No hydrocephalus.     VISUALIZED ORBITS/SINUSES/MASTOIDS: No intraorbital abnormality. No paranasal sinus mucosal disease. No middle ear or mastoid effusion.    BONES/SOFT TISSUES: No acute abnormality.    CERVICAL SPINE CT:  VERTEBRA: Mild dextroscoliosis. Normal vertebral body heights and alignment. No fracture or posttraumatic subluxation.     CANAL/FORAMINA: No canal or neural foraminal stenosis.    PARASPINAL: No extraspinal abnormality. Visualized lung fields are clear.    THORACIC SPINE CT:  VERTEBRA: Mild age-indeterminate compression deformities involving the superior endplate of T2-T4. Moderate compression deformities of T5 and to a lesser extent T6. Severe compression fracture of T7 with slight buckling of the posterior cortex into the   ventral canal. Moderate to severe compression fracture of T9 with minimal buckling of the posterior cortex into the ventral canal. Severe compression fracture of T11 with slight buckling of the posterior cortex into the ventral  canal. Chronic fracture of   the posterior left third rib.     CANAL/FORAMINA: No canal or neural foraminal stenosis.    PARASPINAL: No extraspinal abnormality.    LUMBAR SPINE CT:  VERTEBRA: 5 lumbar type vertebra. Moderate age-indeterminate burst fracture of L1 with up to 60% loss of height centrally. 5 mm retropulsion of the posterior cortex into the ventral canal. Moderate to severe compression fracture involving the central   superior and inferior endplates of L3 and inferior endplate of L4. Healing fractures involving the left L1 and L2 transverse processes.     CANAL/FORAMINA: No high-grade central canal stenosis and mild central canal stenosis at L1. Moderate to severe foraminal stenosis bilaterally at L4-L5 and L5-S1.    PARASPINAL: No extraspinal abnormality.      Impression    IMPRESSION:  HEAD CT:  1.  No acute intracranial process.  2.  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  3.  Prominent atrophy for age.    CERVICAL SPINE CT:  1.  No fracture or posttraumatic subluxation.  2.  No high-grade spinal canal or neural foraminal stenosis.    THORACIC SPINE CT:  1.  Age-indeterminate severe compression deformities of T7 and T11.  2.  Age-indeterminate moderate to severe compression deformities of T5 and T9.  3.  Age-indeterminate moderate compression fracture of T6.   4.  Age-indeterminate mild compression deformities involving the superior endplates of T2-T4.  5.  No high-grade central canal stenosis or neural foraminal narrowing.    LUMBAR SPINE CT:  1.  Age-indeterminate compression fractures of L1, L3 and L4 as described above.  2.  Moderate to severe foraminal stenosis bilaterally at L4-L5 and L5-S1.  3.  Healing left-sided transverse process fractures at L1 and L2.   CT Chest/Abdomen/Pelvis w Contrast   Result Value    Radiologist flags Enlarged lymph node    Narrative    EXAM: CT CHEST/ABDOMEN/PELVIS W CONTRAST  LOCATION: [facility]  DATE/TIME: 10/10/2021 10:58 PM    INDICATION: Diffuse pain after fall on back. Tachycardia and confusion.  COMPARISON: None.  TECHNIQUE: CT scan of the chest, abdomen, and pelvis was performed following injection of IV contrast. Multiplanar reformats were obtained. Dose reduction techniques were used.   CONTRAST: 100 mL Isovue-370.    FINDINGS:   LUNGS AND PLEURA: No pneumothorax or pleural fluid. Patchy bilateral areas of groundglass opacity and atelectasis in both lungs.    MEDIASTINUM/AXILLAE: No evidence for acute thoracic aortic injury. No pneumomediastinum or significant fluid in the mediastinum. No lymphadenopathy. No pericardial fluid. Normal caliber esophagus. No chest wall hematoma.    CORONARY ARTERY CALCIFICATION: None.    HEPATOBILIARY: Hepatic cirrhosis. Hepatic steatosis. No hepatic laceration or perihepatic fluid. Cholecystectomy without significant biliary dilatation allowing for postcholecystectomy state.    PANCREAS: Unremarkable.    SPLEEN: Normal size spleen. No splenic laceration or perisplenic hematoma.    ADRENAL GLANDS: Unremarkable. No adrenal hemorrhage.    KIDNEYS/BLADDER: Symmetric contrast enhancement to both kidneys. No renal laceration or perinephric hematoma. Bladder unremarkable.    BOWEL: No bowel dilatation or inflammatory change. Appendix unremarkable.    LYMPH NODES: 1.2 cm lymph node in the gastrohepatic ligament. No other mildly enlarged lymph nodes.    VASCULATURE: Normal caliber aorta. Atherosclerotic vascular calcification.    PELVIC ORGANS: No free fluid.    MUSCULOSKELETAL: Multiple age-indeterminate compression fractures throughout the thoracic and lumbar spine at T5, T6, T7, T9, T11, L1, L3 and L4. Subacute healing oblique fracture involving the proximal sternum with bridging callus formation. No acute   rib fractures. Multiple healed rib fractures bilaterally. Old healed right clavicular fracture.      Impression    IMPRESSION:  1.  Multiple  age-indeterminate compression fractures throughout the thoracic and lumbar spine as detailed above. Please see dedicated CT reports from 10/10/2021 for further details.    2.  No evidence for acute injury elsewhere within the chest, abdomen or pelvis.    3.  Patchy bilateral groundglass opacities and atelectasis in both lungs allowing for motion artifact. Underlying infectious etiologies including COVID-19 pneumonia cannot be excluded.    4.  Hepatic cirrhosis and hepatic steatosis.    5.  Enlarged lymph node at the gastrohepatic ligament measuring 1.2 cm. Continued long-term follow-up recommended in 6-12 months.      [Recommend Follow Up: Enlarged lymph node]    This report will be copied to the [facility] to ensure a provider acknowledges the finding.      CT Thoracic Spine w/o Contrast    Narrative    EXAM: CT HEAD W/O CONTRAST, CT LUMBAR SPINE W/O CONTRAST, CT CERVICAL SPINE W/O CONTRAST, CT THORACIC SPINE W/O CONTRAST  LOCATION: [facility]  DATE/TIME: 10/10/2021 10:56 PM    INDICATION: Fall, confusion. Pain. Tripped and fell.  COMPARISON: None.  TECHNIQUE:   1) Routine CT Head without IV contrast. Multiplanar reformats. Dose reduction techniques were used.   2) Routine CT Cervical Spine without IV contrast. Multiplanar reformats. Dose reduction techniques were used.   3) Routine CT Thoracic Spine without IV contrast. Multiplanar reformats. Dose reduction techniques were used.   4) Routine CT Lumbar Spine without IV contrast. Multiplanar reformats. Dose reduction techniques were used.     FINDINGS:   HEAD CT:   INTRACRANIAL CONTENTS: No intracranial hemorrhage, extraaxial collection, or mass effect. No CT evidence of acute infarct. 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 Moderate generalized volume loss. No hydrocephalus.     VISUALIZED ORBITS/SINUSES/MASTOIDS: No intraorbital abnormality. No paranasal sinus mucosal  disease. No middle ear or mastoid effusion.    BONES/SOFT TISSUES: No acute abnormality.    CERVICAL SPINE CT:  VERTEBRA: Mild dextroscoliosis. Normal vertebral body heights and alignment. No fracture or posttraumatic subluxation.     CANAL/FORAMINA: No canal or neural foraminal stenosis.    PARASPINAL: No extraspinal abnormality. Visualized lung fields are clear.    THORACIC SPINE CT:  VERTEBRA: Mild age-indeterminate compression deformities involving the superior endplate of T2-T4. Moderate compression deformities of T5 and to a lesser extent T6. Severe compression fracture of T7 with slight buckling of the posterior cortex into the   ventral canal. Moderate to severe compression fracture of T9 with minimal buckling of the posterior cortex into the ventral canal. Severe compression fracture of T11 with slight buckling of the posterior cortex into the ventral canal. Chronic fracture of   the posterior left third rib.     CANAL/FORAMINA: No canal or neural foraminal stenosis.    PARASPINAL: No extraspinal abnormality.    LUMBAR SPINE CT:  VERTEBRA: 5 lumbar type vertebra. Moderate age-indeterminate burst fracture of L1 with up to 60% loss of height centrally. 5 mm retropulsion of the posterior cortex into the ventral canal. Moderate to severe compression fracture involving the central   superior and inferior endplates of L3 and inferior endplate of L4. Healing fractures involving the left L1 and L2 transverse processes.     CANAL/FORAMINA: No high-grade central canal stenosis and mild central canal stenosis at L1. Moderate to severe foraminal stenosis bilaterally at L4-L5 and L5-S1.    PARASPINAL: No extraspinal abnormality.      Impression    IMPRESSION:  HEAD CT:  1.  No acute intracranial process.  2.  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  3.  Prominent atrophy for age.    CERVICAL SPINE CT:  1.  No fracture or posttraumatic subluxation.  2.  No high-grade spinal canal or  neural foraminal stenosis.    THORACIC SPINE CT:  1.  Age-indeterminate severe compression deformities of T7 and T11.  2.  Age-indeterminate moderate to severe compression deformities of T5 and T9.  3.  Age-indeterminate moderate compression fracture of T6.   4.  Age-indeterminate mild compression deformities involving the superior endplates of T2-T4.  5.  No high-grade central canal stenosis or neural foraminal narrowing.    LUMBAR SPINE CT:  1.  Age-indeterminate compression fractures of L1, L3 and L4 as described above.  2.  Moderate to severe foraminal stenosis bilaterally at L4-L5 and L5-S1.  3.  Healing left-sided transverse process fractures at L1 and L2.   Lumbar spine CT w/o contrast    Narrative    EXAM: CT HEAD W/O CONTRAST, CT LUMBAR SPINE W/O CONTRAST, CT CERVICAL SPINE W/O CONTRAST, CT THORACIC SPINE W/O CONTRAST  LOCATION: [facility]  DATE/TIME: 10/10/2021 10:56 PM    INDICATION: Fall, confusion. Pain. Tripped and fell.  COMPARISON: None.  TECHNIQUE:   1) Routine CT Head without IV contrast. Multiplanar reformats. Dose reduction techniques were used.   2) Routine CT Cervical Spine without IV contrast. Multiplanar reformats. Dose reduction techniques were used.   3) Routine CT Thoracic Spine without IV contrast. Multiplanar reformats. Dose reduction techniques were used.   4) Routine CT Lumbar Spine without IV contrast. Multiplanar reformats. Dose reduction techniques were used.     FINDINGS:   HEAD CT:   INTRACRANIAL CONTENTS: No intracranial hemorrhage, extraaxial collection, or mass effect. No CT evidence of acute infarct. 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 Moderate generalized volume loss. No hydrocephalus.     VISUALIZED ORBITS/SINUSES/MASTOIDS: No intraorbital abnormality. No paranasal sinus mucosal disease. No middle ear or mastoid effusion.    BONES/SOFT TISSUES: No acute abnormality.    CERVICAL SPINE  CT:  VERTEBRA: Mild dextroscoliosis. Normal vertebral body heights and alignment. No fracture or posttraumatic subluxation.     CANAL/FORAMINA: No canal or neural foraminal stenosis.    PARASPINAL: No extraspinal abnormality. Visualized lung fields are clear.    THORACIC SPINE CT:  VERTEBRA: Mild age-indeterminate compression deformities involving the superior endplate of T2-T4. Moderate compression deformities of T5 and to a lesser extent T6. Severe compression fracture of T7 with slight buckling of the posterior cortex into the   ventral canal. Moderate to severe compression fracture of T9 with minimal buckling of the posterior cortex into the ventral canal. Severe compression fracture of T11 with slight buckling of the posterior cortex into the ventral canal. Chronic fracture of   the posterior left third rib.     CANAL/FORAMINA: No canal or neural foraminal stenosis.    PARASPINAL: No extraspinal abnormality.    LUMBAR SPINE CT:  VERTEBRA: 5 lumbar type vertebra. Moderate age-indeterminate burst fracture of L1 with up to 60% loss of height centrally. 5 mm retropulsion of the posterior cortex into the ventral canal. Moderate to severe compression fracture involving the central   superior and inferior endplates of L3 and inferior endplate of L4. Healing fractures involving the left L1 and L2 transverse processes.     CANAL/FORAMINA: No high-grade central canal stenosis and mild central canal stenosis at L1. Moderate to severe foraminal stenosis bilaterally at L4-L5 and L5-S1.    PARASPINAL: No extraspinal abnormality.      Impression    IMPRESSION:  HEAD CT:  1.  No acute intracranial process.  2.  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  3.  Prominent atrophy for age.    CERVICAL SPINE CT:  1.  No fracture or posttraumatic subluxation.  2.  No high-grade spinal canal or neural foraminal stenosis.    THORACIC SPINE CT:  1.  Age-indeterminate severe compression deformities of  "T7 and T11.  2.  Age-indeterminate moderate to severe compression deformities of T5 and T9.  3.  Age-indeterminate moderate compression fracture of T6.   4.  Age-indeterminate mild compression deformities involving the superior endplates of T2-T4.  5.  No high-grade central canal stenosis or neural foraminal narrowing.    LUMBAR SPINE CT:  1.  Age-indeterminate compression fractures of L1, L3 and L4 as described above.  2.  Moderate to severe foraminal stenosis bilaterally at L4-L5 and L5-S1.  3.  Healing left-sided transverse process fractures at L1 and L2.       Recent vital signs:   /69   Pulse 75   Temp 98.3  F (36.8  C) (Oral)   Resp 17   Ht 1.727 m (5' 8\")   Wt 65.8 kg (145 lb)   SpO2 98%   BMI 22.05 kg/m      Feliberto Coma Scale Score: 15 (10/10/21 2100)       Cardiac Rhythm: Normal Sinus  Pt needs tele? Yes  Skin/wound Issues: None    Code Status: Full Code    Pain control: good    Nausea control: good    Abnormal labs/tests/findings requiring intervention: SEE EPIC    Family present during ED course? No   Family Comments/Social Situation comments: NA    Tasks needing completion: UDS, NARINDER Foy, RN  asc -- [phone] [facility]  [phone] [facility] ED    "

## 2021-10-11 NOTE — ED PROVIDER NOTES
ED Provider Note  [facility]      History     Chief Complaint   Patient presents with     Fall     The history is provided by the patient and medical records. The history is limited by the condition of the patient (Confused on arrival, later seized and was post-ictal/even less able to provide history).     Mario Lamar II is a 56 year old male who has a reported history in our system of alcohol withdrawal, pancreatitis, and depression w/ SI (hasn't been seen here since 2018), with Care Everywhere/other facilities showing additional Hx of seizure disorder, multiple previous ED visits for alcohol abuse, AMS, prior TBI, GERD, et al. (see EMR), who is presenting today by EMS for concern of a fall at [employer] prior to arrival.     In speaking w/ the patient, he does not recall falling or even being at [employer]. He reports he fell today at home after washing dishes. He cannot confirm for me what day it is. He reported to someone that he landed on his back at somepoint with a fall. Last etoh sounds to have been yesterday and denies drinking today. Denies hitting his head or sustaining LOC. He did not arrive w/ C-collar and is not complaining of neck discomfort (c-collar being placed here). He reports some discomfort on his right side (torso, side of chest/back and right abdomen/back). Denies any extremity pain or injuries. Denies numbness, tingling or weakness.     On monitor seems to have irregular and fast HR. Patient denies having heard of this before. Don't see afib listed in his records. Does not appear to be on blood thinners and patient denies such. He denies any CP, palpitations, cannot feel his heart beat. No shortness of breath, fatigue, LH, dizziness, syncope or near syncope to his report.     Doesn't think he's urinating or having different BMs than his normal. Denies blood. Otherwise cannot answer history questions.     Past Medical History  Past Medical History:    Diagnosis Date     Depression      Pancreatitis      PTSD (post-traumatic stress disorder)      Seizures (H)     withdrawl     Substance abuse (H)     alcohol abuse   Seizure disorder of some sort (primary +/- withdrawal, unclear details)  TBI  GERD  (See EMR/Care Everywhere for further details)    Past Surgical History:   Procedure Laterality Date     ORTHOPEDIC SURGERY      Right ankle orthopedic surgery after fracture.     No current outpatient medications on file.    No Known Allergies  Past medical history, past surgical history, medications, and allergies were reviewed with the patient    Family History  Family History   Problem Relation Age of Onset     Alcoholism Sister      Substance Abuse Sister      Substance Abuse Mother      Substance Abuse Father      Substance Abuse Maternal Grandmother      Substance Abuse Maternal Grandfather      Substance Abuse Paternal Grandmother      Substance Abuse Paternal Grandfather      Substance Abuse Brother      Substance Abuse Brother      Substance Abuse Brother      Substance Abuse Brother      Substance Abuse Brother      Family history was reviewed with the patient.     Social History  Social History     Tobacco Use     Smoking status: Former Smoker     Packs/day: 0.50     Years: 33.00     Pack years: 16.50     Quit date: 10/26/2017     Years since quitting: 3.9     Smokeless tobacco: Former User   Substance Use Topics     Alcohol use: Yes     Comment: daily, 0.5 gal of vodka     Drug use: No      Social history was reviewed with the patient. Says last etoh was yesterday.      Review of Systems   Unable to perform ROS: Mental status change (Patient was confused on arrival, later seized and was post ictal (even lass able to provide history thereafter))   Constitutional: Positive for fatigue.        Falls (at least 1-2)   Cardiovascular: Positive for chest pain (right-sided after fall).   Gastrointestinal: Positive for abdominal pain (right sided after fall).     A  "complete review of systems was attempted but limited due to altered mental status. Was confused/AMS initially limiting encounter, and then seized and was even more confused post-ictally. Unable to contribute hx. Hasn't been seen in our system since 2018.     Physical Exam   BP: 120/86  Pulse: 99  Temp: 98.3  F (36.8  C)  Resp: 20  Height: 172.7 cm (5' 8\")  Weight: 65.8 kg (145 lb)  SpO2: 99 %  Physical Exam  CONSTITUTIONAL: Somewhat disheveled. Awake and alert, but confused. No acute distress.   HENT:   - Head: Normocephalic and atraumatic. No midface instability or pain. No deformity or swelling. No findings for basilar skull fx, no raccoon eyes or francis signs, etc.   - Ears: Hearing and external ear grossly normal. No bleeding or drainage noted.   - Nose: Nose normal. No rhinorrhea. No epistaxis.   - Mouth/Throat: MMM. No malocclusion or trismus.   EYES: Conjunctivae and lids are normal. No scleral icterus. PERRL. EOMI    NECK: Phonation normal. Neck supple.  No tracheal deviation, no stridor. No edema or erythema noted. Placed in c-collar. ROM not assessed given concern for fall, c-spine precautions. No discomfort to neck w/ palpation. No palpable stepoffs or deformities.   CARDIOVASCULAR: Irregularly irregular tachycardia, strong distal pulses, no appreciable abnormal heart sounds.  PULMONARY/CHEST: Normal work of breathing. No accessory muscle usage or stridor. No respiratory distress.  No appreciable abnormal breath sounds.  ABDOMEN: Soft, non-distended. No tenderness. No rigidity, rebound or guarding.   MUSCULOSKELETAL: Extremities warm and seemingly well perfused. No edema or calf tenderness. Does have mid/low back discomfort and right sided discomfort (not particular focal bony tenderness, just sore throughout). No apparent crepitus or other skin defects. No pain to palpation or with ROM of the extremities. Pelvis seems stable. No apparent neurovascular deficits appreciated.   NEUROLOGIC: Awake, alert on " arrival, but does appear confused. No obvious focal findings. Did not appear tremulous on arrival. Later had generalized tonic-clonic seizure movements followed by post-ictal periods.   SKIN: Skin is warm and dry. No rash noted. No diaphoresis. No pallor.   PSYCHIATRIC: Normal mood and affect. Speech and behavior normal. Thought processes linear. Cognition and memory are normal.       ED Course     ED Course as of Oct 11 0201   Sun Oct 10, 2021   2158 Had not been informed of patient's tachycardia.  Upon evaluating the patient in the room found the tachycardia and on monitor appears to be irregular.  ECG obtained which appears to be A. fib with RVR, which the patient does not believe he has had previously (and I do not see a record of such in the EMR before).  Patient does not feel any palpitations, no chest pain, no shortness of breath and was unaware that he was in any type of abnormal heart rhythm.  Denies being on any chronic anticoagulation.      2214 Pt seized in presence of RN, moved to Rm 3, post ictal, but SpO2 100%      2240 Accompanied patient from CT, just got back. Pt able to tolerate there w/o issue. Neuro met us there and are evaluating the patient.       2240 Obtained right after the seizure   Lactic Acid POCT(!!): 12.1   2300 Patient seized again.  Seizure lasted less than a minute.  Discussed with Neurology and they recommended Keppra load over Ativan dosing though the patient seizes again we can administer Ativan at that time.      2323 Looks like may be back in sinus. New ECG being obtained.       Mon Oct 11, 2021   0008 Discussed w/ Trauma, they will consult. Recommend discussion w/ Neurosurgery as per usual for spine fx's        LACTIC ACID  = 12  Lactate significantly elevated but drawn immediately following a GTC seizure, which could explain level. Can't exclude sepsis and will look for an infectious source, but the lactate itself is thought to be related to the generalized seizure patient  just had.     CRITICAL CARE  Critical care time for this patient, excluding teaching and procedures, was 60 minutes for identification/management of multiple generalized seizures in the ED, Altered mental status w/o return to baseline, tachydysrhythmias requiring cardiac monitoring, multiple spinal fractures, coordination w/ multiple services (Neuro, Neurosurgery, Trauma, etc.)    Assessments & Plan (with Medical Decision Making)   IMPRESSION: 56 year old male w/ PMH notable for alcohol withdrawal, pancreatitis, and depression w/ SI (hasn't been seen here since 2018), with Care Everywhere/other facilities showing additional Hx of seizure disorder, multiple previous ED visits for alcohol abuse, AMS, prior TBI, GERD, et al. (see EMR), who is presenting today by EMS for concern of a fall at [employer] prior to arrival.   --- Encounter limited by patient's confusion/AMS, and then subsequently by patient seizing multiple times and post-ictal/additional AMS.     Patient arrived confused w/ report of fall/minor trauma (placed in spine precautions w/ collar), unable to provide much of own history. There were no obvious focal/neuro findings on exam, did not appear tremulous or hypertensive or in jorge withdrawal at the time, no outward findings for trauma. He was found to be in afib w/ RVR which is seemingly new, cause/etiology TBD, does not appear the he has hx of such and does not appear to be chronically anticoagulated.     Multiple issues to address here today. Firstly, considering the report of fall in setting of confusion and multiple pain complaints, need to evaluate for injuries from such (will get pan-scan to evaluate for injuries). Has the afib w/ RVR, which could be spontaneous or results of trigger/driving force (dehydration, infection, metabolic derangement/electrolyte abnormality, cardiac condition, et al.), patient has no idea when he would have went into this rhythm. Is also confused. Could be baseline given has  a hx of TBI, intoxicated, not intoxicated but a Wernicke type picture, delirious from other substance or medical cause (infection, metabolic, etc.), from the reported fall today, et al.     PLAN: Placed in C-collar given report of fall and confusion.   - Will get labs, urine studies, head/neck and C/A/P imaging, ECG shows afib w/ RVR but not hypotensive, starting fluids given likely poor intake and will re-evaluate.   - Patient later seized, moved to higher acuity bed in ED, Neuro consulted in addition to other medical/trauma workup  - Risks/benefits of pursuing imaging reviewed and accepted.     RESULTS:  - Labs: Labs had not yet been drawn prior to patient seizing which may alter some lab results beyond what they would have been prior to seizures.   --- Lactate 12, likely related to seizure, POC glucose was 147  --- K 2.9 but could be effected by seizure, TSH elevated (Free t4 pending), troponin negative. WBC 11.1, etoh negative,   - Urine: Not yet provided/collected  - Imaging: Formal reports pending, but sounds as though has multiple age-indeterminate compression/spinal fractures    INTERVENTIONS:   - Discussions/consults w/ Neuro, Trauma, Neurosurgery, discussion w/ MICU attending  - C-collar  - Spine precautions  - Seizure precuations  - IVF  - IV Ativan (1mg IV x1)  - IV Keppra (2g IV Load x1 per Neuro)  - IV Thiamine, IV folate    RE-EVALUATION:  - The patient seized a 2nd time in ED but was relatively short lived, maintained SpO2, seemed to have good airway protection.   - HR decreased from the 130-160 range to 's from afib to sinus spontaneously.   - Pt otherwise continues to do well here in the ED, no acute issues or apparent concerning changes in vitals or clinical appearance.    DISCUSSIONS:  - w/ ED RN: Spoke w/ patient's primary RN prior to shift change to next ED physician. Discussed how K may have been falsely low in setting of his seizure, so as opposed to normal potassium orderset, wanted  him to recheck the potassium after just the first 10mEq to see what the K is then, and then base the rest of replacement needs of that new level so as to not overcorrect the K and make hyperkalemic. Should speak w/ admitting team/ED provider w/ any questions based on that next K to be drawn after this first replacement bag.   - w/ Neuro: They have seen and evaluated the patient here in the ED. They will follow along but would prefer a medical team be primary  - w/ Trauma and Neurosurgery: Discussed findings/presentation w/ the Trauma and Neurosurgery teams. They will see/evaluate patient and follow while admitted. Consults pending at shift change.   - w/ MICU Attending: Reviewed case. They think IMC can manage but patient be overflowed to ICU room logistically and then they're aware if there are any escalating needs, etc.   - w/ IM: Reviewed patient/presentation, current state of workup/any pending studies. They will admit for further evaluation/management, F/U pending studies as needed, coordinate w/ consulting services as needed. No additional requests/recommendations for workup/management for in the ED at this time.    DISPOSITION/PLANNING:  IMPRESSION:   --- Seizures (x2 witnessed in ED, Generalized)  --- New Afib w/ RVR (spontaneously converted after IVF and seizure AEDs)  --- Hypokalemia  --- Multiple age-indeterminate spinal fractures.   - DISPOSITION: Admit to IM ([facility], overflow to ICU)  PENDING:   --- Trauma and Neurosurgery consults  --- Finalized imaging reports  --- Cultures, urine studies  OTHER RECOMMENDATIONS:   --- Continued Trauma eval/workup w/ Tertiary  --- Cardiac monitoring/Cards consult for new afib w/ RVR  --- Seizure precautions  --- Withdrawal precautions  --- Spine precuations  --- Further workup/plan for incidental findings    ______________________________________________________________________            --  Estefany Martins MD  [facility] EMERGENCY DEPARTMENT  10/10/2021      Estefany Martins MD  10/12/21 [id number]

## 2021-10-11 NOTE — PROGRESS NOTES
Resident/Fellow Attestation   I, Aquilino Frye, was present with the medical/CHRISTINE student who participated in the service and in the documentation of the note.  I have verified the history and personally performed the physical exam and medical decision making.  I agree with the assessment and plan of care as documented in the note.      Aquilino Frye MD  PGY2  Date of Service (when I saw the patient): 10/11/21    [facility]    Progress Note - Maroon 5 Service        Date of Admission:  10/10/2021    Changes today:  - started high dose IV thiamine    - initiate Keppra 750 mg BID and EEG ordered   -  Pau ([phone]) gave additional information   - TTE ordered to evaluate etiology of atrial fibrillation with RVR on admission      Assessment & Plan              Mario Lamar II is a 56 year old man with a history of substance use disorder, suicidal ideation, depression who presents after seizure found to have multiple compression fractures, hepatic steatosis, A. fib with RVR (resolved) admitted for further work-up.     Seizure disorder  Patient had 2 witnessed seizures in the ED, with generalized shaking movements in upper deviation of his eyes.  Patient was Keppra loaded in the ED.  Patient reports history of seizures, alcohol withdrawal may be complicating this presentation.  He is currently alert, conversant, following commands.  - Neurology consulted   - initiate Keppra 750 mg BID     - EEG    - Seizure precautions    Syncope prior to fall  Patient had syncopal event witnessed at [employer] prior to admission. He does not remember this event and cannot recall if he had pre-syncopal symptoms. Differential for etiology for syncope includes atrial fibrillation with RVR, vasovagal, neurologic   - Telemetry  - TTE    Atrial fibrillation with RVR, resolved  In the setting of seizure, resolved spontaneously. No history of prior echo or heart  history. Concern with alcohol history there could be a component of cardiomyopathy or some unclear etiology playing into the onset of this atrial fibrillation.   - Repeat EKG for baseline  - obtain TTE      Trauma  Compression fractures of T2 to L4, age-indeterminate  Moderate to severe foraminal stenosis lumbar vertebrae   Patient presented after fall at [employer], had seizure in the ED. Given the age of the compression fractures is indeterminate, unclear if this all happened after the fall.   - Trauma surgery evaluated in ED, appreciate recommendations    - will do tertiary exam 10/12  - Neurosurgery consulted, appreciate recommendations   - recommended TLSO brace placement    - spinal precautions removed once brace placed     Concern for alcohol withdrawal  Substance use disorder  ?h/x Wernicke encephalopathy   Last drink per patient is a week ago, alcohol level negative.  Patient reports he has used marijuana, smokes cigarettes but quit a year ago. He is unclear about IV drug use.  On admission, acetaminophen, salicylate level negative.  On exam patient without tremors, or anxiety, and was appropriately conversing without agitation. U tox negative. Clinical picture less concerning for alcohol withdrawal induced seizure. Patient reports that he was suppose to be going to chem dep treatment in the future. Per report from , patient was diagnosed with early wernicke encehalopathy previously at [facility].   - CIWA protocol with lorazepam   - Telemetry  - Addiction medicine consult    - high dose thiamine     Psoriasis   Patient presents with diffuse scaly, patchy lesions, most concentrated on entire back, elbows and lower legs, concerning for psoriasis. Reports that he deals with this rash itching at home, but has never taken medications for it.   - hydrocortisone 2.5% cream      Elevated liver enzymes   Direct hyperbilirubinemia   Coagulopathy of liver disease  Hepatic cirrhosis and steatosis  Likely secondary  to alcohol use disorder.  He had been evaluated by hepatology and a prior admission in 2018 was not a candidate for transplant given acute alcohol abuse.   - viral hepatitis labs pending       Hypokalemia  - Repeat K check    - Potassium replacement protocol       --Other problems--    Anion gap metabolic acidosis, resolved   Lactic acid 12 on admission, drawn right after seizure. Normalized to 1.0 on recheck. Leukocytosis reactive.  repeat VBG normalized.     Subclinical hypothyroidism  Elevated TSH, normal T4.  May be related to toxic substances.   - Obtain history determine if symptomatic to determine if needs treatment     Patchy bilateral groundglass opacities and atelectasis in lungs  Covid negative, no cough, hypoxia, white count, less concerning for infection.   -s/p one dose ceftri in the emergency department for CAP      Enlarged lymph node at gastrohepatic ligament 1.2 cm  -Follow-up in 6 to 12 months       Diet: Advance Diet as Tolerated: Clear Liquid Diet  Snacks/Supplements Adult: Ensure Clear; With Meals    DVT Prophylaxis: Pneumatic Compression Devices  Ziegler Catheter: Not present  Fluids: PO  Central Lines: None  Code Status: Full Code      Disposition Plan   Expected discharge: 10/18/2021  5-7 days pending seizure disorder work up and management.      The patient's care was discussed with the Attending Physician, Dr. Vicky Smith MD.    Samantha Eller  Medical Student  [address]  Securely message with the Vocera Web Console (learn more here)  Text page via [facility] Paging/Directory  Please see sign in/sign out for up to date coverage information    Clinically Significant Risk Factors Present on Admission             # Coagulation Defect: INR = 1.33 (Ref range: 0.85 - 1.15) and/or PTT = N/A on admission, will monitor for bleeding  # Thrombocytopenia: Plts = 103 10e3/uL (Ref range: 150 - 450 10e3/uL) on admission, will monitor for bleeding       ______________________________________________________________________    Interval History   Patient reports that he feels much better this morning. His last drink was 1-2 weeks ago. He normally drinks 0.5 liters of vodka between himself and two friends. He denies any history of alcohol withdrawal or alcohol related seizures. He remembers that he was at home washing dishes before he came into the hospital, does not remember being at [employer]. Denies any back pain. States that his skin is very itchy and he has had a rash for quite some time, never used any medications for it.      Data reviewed today: I reviewed all medications, new labs and imaging results over the last 24 hours.     Physical Exam   Vital Signs: Temp: 98.2  F (36.8  C) Temp src: Oral BP: 96/73 Pulse: 72   Resp: 18 SpO2: 97 % O2 Device: None (Room air)    Weight: 143 lbs 1.26 oz  Constitutional: awake, alert and no apparent distress  Respiratory: No increased work of breathing, good air exchange, clear to auscultation bilaterally, no crackles or wheezing  Cardiovascular: regular rate and rhythm, normal S1 and S2 and no murmur noted  GI: normal bowel sounds, no tenderness to palpation   Skin: diffuse scaly, erythematous rash diffuse on back, elbows, lower legs   Musculoskeletal: spontaneously moves all 4 extremities   Neuropsychiatric: patient conversational, alert and answering questions appropriately, occasionally closes eyes during interview     Data   Recent Labs   Lab 10/11/21  0629 10/11/21  0119 10/10/21  2225 10/10/21  2216   WBC 8.4  --   --  11.1*   HGB 12.2*  --   --  15.5  16.0   MCV 97  --   --  100   *  --   --  159   INR 1.33*  --   --  1.25*     --   --  137  140   POTASSIUM 3.0* 3.4  --  2.9*  2.9*   CHLORIDE 108  --   --  104   CO2 24  --   --  16*   BUN 6*  --   --  8   CR 0.47*  --  0.6* 0.57*   ANIONGAP 7  --   --  17*   JOHN 8.0*  --   --  8.8   GLC 84  --   --  112*  123*   ALBUMIN 2.7*  --   --  3.7    PROTTOTAL 7.3  --   --  9.8*   BILITOTAL 1.9*  --   --  1.8*   ALKPHOS 250*  --   --  350*   ALT 46  --   --  79*   *  --   --  188*   LIPASE  --   --   --  149   TROPONIN  --   --   --  <0.015     Recent Results (from the past 24 hour(s))   Head CT w/o contrast    Narrative    EXAM: CT HEAD W/O CONTRAST, CT LUMBAR SPINE W/O CONTRAST, CT CERVICAL SPINE W/O CONTRAST, CT THORACIC SPINE W/O CONTRAST  LOCATION: [facility]  DATE/TIME: 10/10/2021 10:56 PM    INDICATION: Fall, confusion. Pain. Tripped and fell.  COMPARISON: None.  TECHNIQUE:   1) Routine CT Head without IV contrast. Multiplanar reformats. Dose reduction techniques were used.   2) Routine CT Cervical Spine without IV contrast. Multiplanar reformats. Dose reduction techniques were used.   3) Routine CT Thoracic Spine without IV contrast. Multiplanar reformats. Dose reduction techniques were used.   4) Routine CT Lumbar Spine without IV contrast. Multiplanar reformats. Dose reduction techniques were used.     FINDINGS:   HEAD CT:   INTRACRANIAL CONTENTS: No intracranial hemorrhage, extraaxial collection, or mass effect. No CT evidence of acute infarct. 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 Moderate generalized volume loss. No hydrocephalus.     VISUALIZED ORBITS/SINUSES/MASTOIDS: No intraorbital abnormality. No paranasal sinus mucosal disease. No middle ear or mastoid effusion.    BONES/SOFT TISSUES: No acute abnormality.    CERVICAL SPINE CT:  VERTEBRA: Mild dextroscoliosis. Normal vertebral body heights and alignment. No fracture or posttraumatic subluxation.     CANAL/FORAMINA: No canal or neural foraminal stenosis.    PARASPINAL: No extraspinal abnormality. Visualized lung fields are clear.    THORACIC SPINE CT:  VERTEBRA: Mild age-indeterminate compression deformities involving the superior endplate of T2-T4. Moderate compression deformities of T5 and to a  lesser extent T6. Severe compression fracture of T7 with slight buckling of the posterior cortex into the   ventral canal. Moderate to severe compression fracture of T9 with minimal buckling of the posterior cortex into the ventral canal. Severe compression fracture of T11 with slight buckling of the posterior cortex into the ventral canal. Chronic fracture of   the posterior left third rib.     CANAL/FORAMINA: No canal or neural foraminal stenosis.    PARASPINAL: No extraspinal abnormality.    LUMBAR SPINE CT:  VERTEBRA: 5 lumbar type vertebra. Moderate age-indeterminate burst fracture of L1 with up to 60% loss of height centrally. 5 mm retropulsion of the posterior cortex into the ventral canal. Moderate to severe compression fracture involving the central   superior and inferior endplates of L3 and inferior endplate of L4. Healing fractures involving the left L1 and L2 transverse processes.     CANAL/FORAMINA: No high-grade central canal stenosis and mild central canal stenosis at L1. Moderate to severe foraminal stenosis bilaterally at L4-L5 and L5-S1.    PARASPINAL: No extraspinal abnormality.      Impression    IMPRESSION:  HEAD CT:  1.  No acute intracranial process.  2.  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  3.  Prominent atrophy for age.    CERVICAL SPINE CT:  1.  No fracture or posttraumatic subluxation.  2.  No high-grade spinal canal or neural foraminal stenosis.    THORACIC SPINE CT:  1.  Age-indeterminate severe compression deformities of T7 and T11.  2.  Age-indeterminate moderate to severe compression deformities of T5 and T9.  3.  Age-indeterminate moderate compression fracture of T6.   4.  Age-indeterminate mild compression deformities involving the superior endplates of T2-T4.  5.  No high-grade central canal stenosis or neural foraminal narrowing.    LUMBAR SPINE CT:  1.  Age-indeterminate compression fractures of L1, L3 and L4 as described above.  2.   Moderate to severe foraminal stenosis bilaterally at L4-L5 and L5-S1.  3.  Healing left-sided transverse process fractures at L1 and L2.   Cervical spine CT w/o contrast    Narrative    EXAM: CT HEAD W/O CONTRAST, CT LUMBAR SPINE W/O CONTRAST, CT CERVICAL SPINE W/O CONTRAST, CT THORACIC SPINE W/O CONTRAST  LOCATION: [facility]  DATE/TIME: 10/10/2021 10:56 PM    INDICATION: Fall, confusion. Pain. Tripped and fell.  COMPARISON: None.  TECHNIQUE:   1) Routine CT Head without IV contrast. Multiplanar reformats. Dose reduction techniques were used.   2) Routine CT Cervical Spine without IV contrast. Multiplanar reformats. Dose reduction techniques were used.   3) Routine CT Thoracic Spine without IV contrast. Multiplanar reformats. Dose reduction techniques were used.   4) Routine CT Lumbar Spine without IV contrast. Multiplanar reformats. Dose reduction techniques were used.     FINDINGS:   HEAD CT:   INTRACRANIAL CONTENTS: No intracranial hemorrhage, extraaxial collection, or mass effect. No CT evidence of acute infarct. 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 Moderate generalized volume loss. No hydrocephalus.     VISUALIZED ORBITS/SINUSES/MASTOIDS: No intraorbital abnormality. No paranasal sinus mucosal disease. No middle ear or mastoid effusion.    BONES/SOFT TISSUES: No acute abnormality.    CERVICAL SPINE CT:  VERTEBRA: Mild dextroscoliosis. Normal vertebral body heights and alignment. No fracture or posttraumatic subluxation.     CANAL/FORAMINA: No canal or neural foraminal stenosis.    PARASPINAL: No extraspinal abnormality. Visualized lung fields are clear.    THORACIC SPINE CT:  VERTEBRA: Mild age-indeterminate compression deformities involving the superior endplate of T2-T4. Moderate compression deformities of T5 and to a lesser extent T6. Severe compression fracture of T7 with slight buckling of the posterior cortex into the    ventral canal. Moderate to severe compression fracture of T9 with minimal buckling of the posterior cortex into the ventral canal. Severe compression fracture of T11 with slight buckling of the posterior cortex into the ventral canal. Chronic fracture of   the posterior left third rib.     CANAL/FORAMINA: No canal or neural foraminal stenosis.    PARASPINAL: No extraspinal abnormality.    LUMBAR SPINE CT:  VERTEBRA: 5 lumbar type vertebra. Moderate age-indeterminate burst fracture of L1 with up to 60% loss of height centrally. 5 mm retropulsion of the posterior cortex into the ventral canal. Moderate to severe compression fracture involving the central   superior and inferior endplates of L3 and inferior endplate of L4. Healing fractures involving the left L1 and L2 transverse processes.     CANAL/FORAMINA: No high-grade central canal stenosis and mild central canal stenosis at L1. Moderate to severe foraminal stenosis bilaterally at L4-L5 and L5-S1.    PARASPINAL: No extraspinal abnormality.      Impression    IMPRESSION:  HEAD CT:  1.  No acute intracranial process.  2.  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  3.  Prominent atrophy for age.    CERVICAL SPINE CT:  1.  No fracture or posttraumatic subluxation.  2.  No high-grade spinal canal or neural foraminal stenosis.    THORACIC SPINE CT:  1.  Age-indeterminate severe compression deformities of T7 and T11.  2.  Age-indeterminate moderate to severe compression deformities of T5 and T9.  3.  Age-indeterminate moderate compression fracture of T6.   4.  Age-indeterminate mild compression deformities involving the superior endplates of T2-T4.  5.  No high-grade central canal stenosis or neural foraminal narrowing.    LUMBAR SPINE CT:  1.  Age-indeterminate compression fractures of L1, L3 and L4 as described above.  2.  Moderate to severe foraminal stenosis bilaterally at L4-L5 and L5-S1.  3.  Healing left-sided transverse  process fractures at L1 and L2.   CT Chest/Abdomen/Pelvis w Contrast   Result Value    Radiologist flags Enlarged lymph node    Narrative    CT of the Chest, Abdomen and Pelvis with contrast, 10/10/2021 11:01  PM.    Comparison: Chest x-ray 12/20/2016.    History: pain, confusion, tachycardia in setting of fall.     Technique: Axial images of the chest, abdomen and pelvis were obtained  with contrast. Coronal reconstructions were provided. Images were  reviewed in bone, lung, and soft tissue windows.     Findings:    Chest:  The thyroid gland is normal. Heart size is normal. No pericardial  effusion. No significant coronary artery calcifications. Thoracic  aorta Main pulmonary arteries are normal in caliber. Conventional  three-vessel branching pattern of the aortic arch.    No mediastinal or axillary adenopathy. Esophagus is normal.    Trachea and central airways are clear. Upper lobe predominant  peribronchovascular groundglass opacities. Bibasilar dependent  atelectasis. No pleural effusion or pneumothorax.    No suspicious or enlarged pulmonary nodules.    Abdomen and Pelvis:   Hepatic steatosis.  No focal enhancing lesion. Post surgical  cholecystectomy changes. Spleen size within normal limits. Homogenous  enhancement of the pancreas. The main pancreatic duct is not dilated.  Prominence of the common bile duct measuring up to 10 mm, likely  secondary to reservoir effect.    No adrenal mass. Symmetric renal enhancement. No hydronephrosis or  calcified stone. Bladder is normal. Prostate is normal.    Stomach is unremarkable. No small or large bowel wall thickening or  dilation. Appendix is normal. No free intraperitoneal air. No  intra-abdominal or pelvic free fluid. No pneumatosis or portal venous  gas. Scattered colonic diverticulosis without additional evidence to  suggest acute diverticulitis.    Abdominal aorta and major branches are normal in caliber and patent  with trace atherosclerotic calcifications.  The main portal vein and  major branches are patent.    No mesenteric, retroperitoneal, or pelvic lymphadenopathy.    Bones and Soft Tissues:   No suspicious osseous lesion. No suspicious mass. Old sternal and  bilateral rib fractures. Multilevel degenerative changes of the  thoracolumbar spine. Age indeterminate multilevel moderate to severe  wedge compression deformities of the thoracolumbar spine.      Impression    Impression:   1.  Upper lobe predominant peribronchovascular groundglass opacities  concerning for acute infectious or inflammatory process.  2.  Age indeterminate multilevel moderate to severe wedge compression  deformities of the thoracolumbar spine.  Please see same date CT spin  series for additional findings.  3.  Colonic diverticulosis without additional evidence to suggest  acute diverticulitis.  4.  Hepatic steatosis.    I have personally reviewed the examination and initial interpretation  and I agree with the findings.    LORRAINE MARTINEZ MD         SYSTEM ID:  [id number]   CT Thoracic Spine w/o Contrast    Narrative    EXAM: CT HEAD W/O CONTRAST, CT LUMBAR SPINE W/O CONTRAST, CT CERVICAL SPINE W/O CONTRAST, CT THORACIC SPINE W/O CONTRAST  LOCATION: [facility]  DATE/TIME: 10/10/2021 10:56 PM    INDICATION: Fall, confusion. Pain. Tripped and fell.  COMPARISON: None.  TECHNIQUE:   1) Routine CT Head without IV contrast. Multiplanar reformats. Dose reduction techniques were used.   2) Routine CT Cervical Spine without IV contrast. Multiplanar reformats. Dose reduction techniques were used.   3) Routine CT Thoracic Spine without IV contrast. Multiplanar reformats. Dose reduction techniques were used.   4) Routine CT Lumbar Spine without IV contrast. Multiplanar reformats. Dose reduction techniques were used.     FINDINGS:   HEAD CT:   INTRACRANIAL CONTENTS: No intracranial hemorrhage, extraaxial collection, or mass effect. No CT evidence of acute infarct.  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 Moderate generalized volume loss. No hydrocephalus.     VISUALIZED ORBITS/SINUSES/MASTOIDS: No intraorbital abnormality. No paranasal sinus mucosal disease. No middle ear or mastoid effusion.    BONES/SOFT TISSUES: No acute abnormality.    CERVICAL SPINE CT:  VERTEBRA: Mild dextroscoliosis. Normal vertebral body heights and alignment. No fracture or posttraumatic subluxation.     CANAL/FORAMINA: No canal or neural foraminal stenosis.    PARASPINAL: No extraspinal abnormality. Visualized lung fields are clear.    THORACIC SPINE CT:  VERTEBRA: Mild age-indeterminate compression deformities involving the superior endplate of T2-T4. Moderate compression deformities of T5 and to a lesser extent T6. Severe compression fracture of T7 with slight buckling of the posterior cortex into the   ventral canal. Moderate to severe compression fracture of T9 with minimal buckling of the posterior cortex into the ventral canal. Severe compression fracture of T11 with slight buckling of the posterior cortex into the ventral canal. Chronic fracture of   the posterior left third rib.     CANAL/FORAMINA: No canal or neural foraminal stenosis.    PARASPINAL: No extraspinal abnormality.    LUMBAR SPINE CT:  VERTEBRA: 5 lumbar type vertebra. Moderate age-indeterminate burst fracture of L1 with up to 60% loss of height centrally. 5 mm retropulsion of the posterior cortex into the ventral canal. Moderate to severe compression fracture involving the central   superior and inferior endplates of L3 and inferior endplate of L4. Healing fractures involving the left L1 and L2 transverse processes.     CANAL/FORAMINA: No high-grade central canal stenosis and mild central canal stenosis at L1. Moderate to severe foraminal stenosis bilaterally at L4-L5 and L5-S1.    PARASPINAL: No extraspinal abnormality.      Impression    IMPRESSION:  HEAD CT:  1.  No acute intracranial  process.  2.  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  3.  Prominent atrophy for age.    CERVICAL SPINE CT:  1.  No fracture or posttraumatic subluxation.  2.  No high-grade spinal canal or neural foraminal stenosis.    THORACIC SPINE CT:  1.  Age-indeterminate severe compression deformities of T7 and T11.  2.  Age-indeterminate moderate to severe compression deformities of T5 and T9.  3.  Age-indeterminate moderate compression fracture of T6.   4.  Age-indeterminate mild compression deformities involving the superior endplates of T2-T4.  5.  No high-grade central canal stenosis or neural foraminal narrowing.    LUMBAR SPINE CT:  1.  Age-indeterminate compression fractures of L1, L3 and L4 as described above.  2.  Moderate to severe foraminal stenosis bilaterally at L4-L5 and L5-S1.  3.  Healing left-sided transverse process fractures at L1 and L2.   Lumbar spine CT w/o contrast    Narrative    EXAM: CT HEAD W/O CONTRAST, CT LUMBAR SPINE W/O CONTRAST, CT CERVICAL SPINE W/O CONTRAST, CT THORACIC SPINE W/O CONTRAST  LOCATION: [facility]  DATE/TIME: 10/10/2021 10:56 PM    INDICATION: Fall, confusion. Pain. Tripped and fell.  COMPARISON: None.  TECHNIQUE:   1) Routine CT Head without IV contrast. Multiplanar reformats. Dose reduction techniques were used.   2) Routine CT Cervical Spine without IV contrast. Multiplanar reformats. Dose reduction techniques were used.   3) Routine CT Thoracic Spine without IV contrast. Multiplanar reformats. Dose reduction techniques were used.   4) Routine CT Lumbar Spine without IV contrast. Multiplanar reformats. Dose reduction techniques were used.     FINDINGS:   HEAD CT:   INTRACRANIAL CONTENTS: No intracranial hemorrhage, extraaxial collection, or mass effect. No CT evidence of acute infarct. 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 Moderate generalized volume  loss. No hydrocephalus.     VISUALIZED ORBITS/SINUSES/MASTOIDS: No intraorbital abnormality. No paranasal sinus mucosal disease. No middle ear or mastoid effusion.    BONES/SOFT TISSUES: No acute abnormality.    CERVICAL SPINE CT:  VERTEBRA: Mild dextroscoliosis. Normal vertebral body heights and alignment. No fracture or posttraumatic subluxation.     CANAL/FORAMINA: No canal or neural foraminal stenosis.    PARASPINAL: No extraspinal abnormality. Visualized lung fields are clear.    THORACIC SPINE CT:  VERTEBRA: Mild age-indeterminate compression deformities involving the superior endplate of T2-T4. Moderate compression deformities of T5 and to a lesser extent T6. Severe compression fracture of T7 with slight buckling of the posterior cortex into the   ventral canal. Moderate to severe compression fracture of T9 with minimal buckling of the posterior cortex into the ventral canal. Severe compression fracture of T11 with slight buckling of the posterior cortex into the ventral canal. Chronic fracture of   the posterior left third rib.     CANAL/FORAMINA: No canal or neural foraminal stenosis.    PARASPINAL: No extraspinal abnormality.    LUMBAR SPINE CT:  VERTEBRA: 5 lumbar type vertebra. Moderate age-indeterminate burst fracture of L1 with up to 60% loss of height centrally. 5 mm retropulsion of the posterior cortex into the ventral canal. Moderate to severe compression fracture involving the central   superior and inferior endplates of L3 and inferior endplate of L4. Healing fractures involving the left L1 and L2 transverse processes.     CANAL/FORAMINA: No high-grade central canal stenosis and mild central canal stenosis at L1. Moderate to severe foraminal stenosis bilaterally at L4-L5 and L5-S1.    PARASPINAL: No extraspinal abnormality.      Impression    IMPRESSION:  HEAD CT:  1.  No acute intracranial process.  2.  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  3.  Prominent  atrophy for age.    CERVICAL SPINE CT:  1.  No fracture or posttraumatic subluxation.  2.  No high-grade spinal canal or neural foraminal stenosis.    THORACIC SPINE CT:  1.  Age-indeterminate severe compression deformities of T7 and T11.  2.  Age-indeterminate moderate to severe compression deformities of T5 and T9.  3.  Age-indeterminate moderate compression fracture of T6.   4.  Age-indeterminate mild compression deformities involving the superior endplates of T2-T4.  5.  No high-grade central canal stenosis or neural foraminal narrowing.    LUMBAR SPINE CT:  1.  Age-indeterminate compression fractures of L1, L3 and L4 as described above.  2.  Moderate to severe foraminal stenosis bilaterally at L4-L5 and L5-S1.  3.  Healing left-sided transverse process fractures at L1 and L2.   Echo Complete   Result Value    LVEF  55-60%    Narrative    [id number]  [id number]  [id number]  [id number]     [facility],[place]  Echocardiography Laboratory  [address] [place] [id number]     Name: DEZ YURIDIA BRUSHQASIM  MRN: [id number]  : 1965  Study Date: 10/11/2021 03:30 PM  Age: 56 yrs  Gender: Male  Patient Location: [facility]  Reason For Study: Arrhythmia  Ordering Physician: ZACARIAS MORATAYA  Performed By: Marry Prado RDCS     BSA: 1.8 m2  Height: 68 in  Weight: 143 lb  HR: 67  BP: 102/67 mmHg  ______________________________________________________________________________  Procedure  Complete Portable Echo Adult.  ______________________________________________________________________________  Interpretation Summary  Global and regional left ventricular function is normal with an EF of 55-60%.  Right ventricular function, chamber size, wall motion, and thickness are  normal.  Pulmonary artery systolic pressure cannot be assessed.  The inferior vena cava is normal.  No pericardial effusion is present.  There is no prior study for direct  comparison.  ______________________________________________________________________________  Left Ventricle  Global and regional left ventricular function is normal with an EF of 55-60%.  Left ventricular wall thickness is normal. Left ventricular size is normal.  Left ventricular diastolic function is normal. No regional wall motion  abnormalities are seen.     Right Ventricle  Right ventricular function, chamber size, wall motion, and thickness are  normal.     Atria  Both atria appear normal.     Mitral Valve  The mitral valve is normal. Trace mitral insufficiency is present.     Aortic Valve  Aortic valve is normal in structure and function.     Tricuspid Valve  The tricuspid valve is normal. Trace tricuspid insufficiency is present.  Pulmonary artery systolic pressure cannot be assessed.     Pulmonic Valve  The valve leaflets are not well visualized. On Doppler interrogation, there is  no significant stenosis or regurgitation.     Vessels  The thoracic aorta is normal. The pulmonary artery cannot be assessed. The  inferior vena cava is normal.     Pericardium  No pericardial effusion is present.     Compared to Previous Study  There is no prior study for direct comparison.  ______________________________________________________________________________  MMode/2D Measurements & Calculations     IVSd: 0.80 cm  LVIDd: 3.6 cm  LVIDs: 2.1 cm  LVPWd: 0.82 cm  FS: 39.6 %  LV mass(C)d: 78.5 grams  LV mass(C)dI: 44.3 grams/m2  Ao root diam: 3.5 cm  asc Aorta Diam: 2.9 cm  LVOT diam: 2.3 cm  LVOT area: 4.2 cm2  RVOT diam: 1.9 cm  LA Volume (BP): 57.2 ml  LA Volume Index (BP): 32.3 ml/m2     RWT: 0.46     Doppler Measurements & Calculations  MV E max casper: 61.1 cm/sec  MV A max casper: 45.1 cm/sec  MV E/A: 1.4  MV dec slope: 287.0 cm/sec2  PA acc time: 0.12 sec  E/E' av.1  Lateral E/e': 4.7  Medial E/e': 7.5     ______________________________________________________________________________  Report approved by: DANIEL Arias,  Donte 10/11/2021 04:15 PM

## 2021-10-11 NOTE — H&P
[facility]    History and Physical / Consult note: Trauma Service     Date of Admission:  10/10/2021    Time of Admission/Consult Request (page/call): 12:15 AM  Time of my evaluation: 1:00 AM  Consulting services:  Neurosurgery - Emergent consult (within 30 mins): Called by ED   Neurology    Assessment & Plan   Trauma mechanism: Fall  Time/date of injury: 8:00 PM 10/10/21  Known Injuries:   Age-indeterminate compression fractures T2,T3,T4,T5, T6, T9, T7,T1, L1,L3,L4    Other diagnoses:   1. Afib with RVR  2. Seizure disorder  3. Etoh abuse  4.   Syncope with fall    Plan:  1. Admit to Medicine  2. Syncope workup  3. Follow-up Neurology recommendations re seizure activity  4. Follow-up Neurosurgery re compression fractures - Thought to be not acte per NSG.   5. Utox  6. PT/OT  7. Maintain C collar for now, Will clear as patient becomes more lucid  8. T&L spine precautions  9. TLSO in AM    Code status: Full confirmed with patient.     ETOH: This patient was asked if in the last 3-6 months there has been a time when he had  5 or more drinks in a single day/outing.. Patient answer to the screening question was in the positive. Spoke with the patient about the correlation of ETOH use and accidents/injuries. We also discussed the importance of abstaining from ETOH use while healing from existing injuries, especially if prescribed narcotics at the time of discharge. The patient demonstrated understanding.  Primary Care Physician   Elle Aguilar    Chief Complaint   Fall    History is obtained from the patient    History of Present Illness   Mario Lamar II is a 56 year old male with a history of seizure disorder and Etoh abuse. Patient is a poor historian but thinks he fell in his kitchen while doing the dishes. Per EMS he was brought in after fall in [place] found to be in Afib. On arrival to ED he was found to be in Afib with RVR which spontaneously corrected.  He did have 2 witnessed seizures in the ED, 1 of which was treated with 1mg of ativan. A CT of his CAP showed multiple compression fractures. Patient denies back pain. He does not know if he hit his head.     Past Medical History    I have reviewed this patient's medical history and updated it with pertinent information if needed.   Past Medical History:   Diagnosis Date     Depression      Pancreatitis      PTSD (post-traumatic stress disorder)      Seizures (H)     withdrawl     Substance abuse (H)     alcohol abuse   Alcohol use disorder, severe, dependence (HCC)     Alcoholic cirrhosis of liver without ascites (HCC)     Chronic alcoholic hepatitis     Seizure disorder (HCC)     Severe protein-calorie malnutrition (HCC)      Past Surgical History   I have reviewed this patient's surgical history and updated it with pertinent information if needed.  Past Surgical History:   Procedure Laterality Date     ORTHOPEDIC SURGERY      Right ankle orthopedic surgery after fracture.     Prior to Admission Medications   Prior to Admission Medications   Prescriptions Last Dose Informant Patient Reported? Taking?   FLUoxetine HCl (PROZAC PO)   Yes No   spironolactone (ALDACTONE) 25 MG tablet   No No   Sig: Take 1 tablet (25 mg) by mouth 2 times daily      Facility-Administered Medications: None     lactulose 20 gram/30 mL Soln  Dose: 5 mL  Commonly known as: CHRONULAC  5 mL, oral, DAILY    multivitamin with folic acid 400 mcg Tablet  Dose: 1 Tablet  1 Tablet, oral, DAILY    omeprazole 20 mg Cpdr  Dose: 20 mg  Commonly known as: PRILOSEC  20 mg, oral, DAILY    phenytoin 300 mg Cap  Dose: 300 mg  Commonly known as: PHENYTEK  300 mg, oral, DAILY    pyridoxine (vitamin B6) 50 mg Tablet  Dose: 50 mg  Commonly known as: VITAMIN B-6  50 mg, oral, DAILY    thiamine mononitrate (vit B1) 100 mg Tablet  Dose: 100 mg  Commonly known as: Vit B1  100 mg, oral, DAILY    triamcinolone acetonide 0.1 % Cream  Dose: 1 application  Commonly known  as: KENALOG  1 application, topical, BID, Apply twice daily to elbows and knuckles        Allergies   No Known Allergies    Social History   Social History     Socioeconomic History     Marital status:      Spouse name: Not on file     Number of children: Not on file     Years of education: Not on file     Highest education level: Not on file   Occupational History     Not on file   Tobacco Use     Smoking status: Former Smoker     Packs/day: 0.50     Years: 33.00     Pack years: 16.50     Quit date: 10/26/2017     Years since quitting: 3.9     Smokeless tobacco: Former User   Substance and Sexual Activity     Alcohol use: Yes     Comment: daily, 0.5 gal of vodka     Drug use: No     Sexual activity: Yes     Partners: Female   Other Topics Concern     Parent/sibling w/ CABG, MI or angioplasty before 65F 55M? Not Asked   Social History Narrative     Not on file     Social Determinants of Health     Financial Resource Strain:      Difficulty of Paying Living Expenses:    Food Insecurity:      Worried About Running Out of Food in the Last Year:      Ran Out of Food in the Last Year:    Transportation Needs:      Lack of Transportation (Medical):      Lack of Transportation (Non-Medical):    Physical Activity:      Days of Exercise per Week:      Minutes of Exercise per Session:    Stress:      Feeling of Stress :    Social Connections:      Frequency of Communication with Friends and Family:      Frequency of Social Gatherings with Friends and Family:      Attends [employer] Services:      Active Member of Clubs or Organizations:      Attends Club or Organization Meetings:      Marital Status:    Intimate Partner Violence:      Fear of Current or Ex-Partner:      Emotionally Abused:      Physically Abused:      Sexually Abused:        Family History   Family history reviewed with patient and is noncontributory.    Review of Systems   CONSTITUTIONAL: No fever, chills, sweats, fatigue   EYES: no visual blurring,  no double vision or visual loss  ENT: no decrease in hearing, no tinnitus, no vertigo, no hoarseness  RESPIRATORY: no shortness of breath, no cough, no sputum   CARDIOVASCULAR: no palpitations, no chest  pain, no exertional chest pain or pressure  GASTROINTESTINAL: no nausea or vomiting, or abd pain  GENITOURINARY: no dysuria, no frequency or hesitancy, no hematuria  MUSCULOSKELETAL: no weakness, no redness, no swelling, no joint pain,   SKIN: no rashes, ecchymoses, abrasions or lacerations  NEUROLOGIC: no numbness or tingling of hands, no numbness or tingling  of feet, no syncope, no tremors or weakness  PSYCHIATRIC: no sleep disturbances, no anxiety or depression    Physical Exam   Temp: 98.3  F (36.8  C) Temp src: Oral BP: 101/71 Pulse: 93   Resp: 18 SpO2: 100 %      Vital Signs with Ranges  Temp:  [98.3  F (36.8  C)] 98.3  F (36.8  C)  Pulse:  [] 93  Resp:  [17-24] 18  BP: (101-121)/(71-87) 101/71  SpO2:  [96 %-100 %] 100 % 145 lbs 0 oz    Primary Survey:  Airway: patient talking  Breathing: symmetric respiratory effort bilaterally  Circulation: central pulses present and peripheral pulses present  Disability: Pupils - left 4 mm and brisk, right 4 mm and brisk     [place] Coma Scale - Total 15/15  Eye Response (E): 4  4= spontaneous,  3= to verbal/voice, 2=  to pain, 1= No response   Verbal Response (V): 5   5= Orientated, converses,  4= Confused, converses, 3= Inappropriate words,  2= Incomprehensible sounds,  1=No response   Motor Response (M): 6   6= Obeys commands, 5= Localizes to pain, 4= Withdrawal to pain, 3=Fexion to pain, 2= Extension to pain, 1= No response    Secondary Survey:  General: alert, oriented to person, slow to answer  Head: atraumatic, normocephalic, trachea midline  Eyes: PERRLA, pupils 3 mm, EOMI, corneas and conjunctivae clear  Ears: non-inflamed external ear canals  Nose: nares patent, no drainage, nasal septum non-tender  Mouth/Throat: no exudates or erythema,  no dental  tenderness or malocclusions, no tongue lacerations  Neck: cervical collar present. No midline posterior tenderness, full AROM without pain or tenderness   Chest/Pulmonary: normal respiratory rate and rhythm,  bilateral clear breath sounds, no wheezes, rales or rhonchi, no chest wall tenderness or deformities,   Cardiovascular: S1, S2,  normal and regular rate and rhythm, no murmurs  Abdomen: soft, non-tender, no guarding, no rebound tenderness and no tenderness to palpation  : pelvis stable to lateral compression,  no healy,   Back/Spine: no deformity, no midline tenderness, no sacral tenderness,  no step-offs and no abrasions or contusions  Musculoskel/Extremities: normal extremities, full AROM of major joints without tenderness, edema, erythema, ecchymosis, or abrasions. no edema.   Hand: no gross deformities of hands or fingers. Full AROM of hand and fingers in flexion and extension.  strength equal and symmetric.   Skin: no rashes, laceration, ecchymosis, skin warm and dry.   Neuro: PERRLA, alert, oriented x 2. CN II-XII grossly intact. No focal deficits. Strength 5/5 x 4 extremities.  Sensation intact.  Psychiatric: affect/mood normal, cooperative, normal judgement/insight and memory intact  # Pain Assessment:  Current Pain Score 10/11/2021   Patient currently in pain? no   Pain score (0-10) -   Pain location -   Pain descriptors -   Mario irving pain level was assessed and he currently denies pain.      Data   UA RESULTS:  Recent Labs   Lab Test 06/23/18  1726   COLOR Bradley   APPEARANCE Slightly Cloudy   URINEGLC Negative   URINEBILI Small*   URINEKETONE 10*   SG 1.024   UBLD Negative   URINEPH 6.0   PROTEIN 30*   NITRITE Negative   LEUKEST Large*   RBCU 3*   WBCU 49*      Results for orders placed or performed during the hospital encounter of 10/10/21 (from the past 24 hour(s))   EKG 12-lead, tracing only   Result Value Ref Range    Systolic Blood Pressure  mmHg    Diastolic Blood Pressure  mmHg     Ventricular Rate 147 BPM    Atrial Rate 288 BPM    TX Interval  ms    QRS Duration 82 ms     ms    QTc 500 ms    P Axis  degrees    R AXIS -26 degrees    T Axis -79 degrees    Interpretation ECG       Atrial fibrillation with rapid ventricular response  Cannot rule out Anterior infarct , age undetermined  Abnormal ECG     Glucose by meter   Result Value Ref Range    GLUCOSE BY METER POCT 147 (H) 70 - 99 mg/dL   CBC with platelets differential    Narrative    The following orders were created for panel order CBC with platelets differential.  Procedure                               Abnormality         Status                     ---------                               -----------         ------                     CBC with platelets and d...[[id number]]  Abnormal            Final result                 Please view results for these tests on the individual orders.   INR   Result Value Ref Range    INR 1.25 (H) 0.85 - 1.15   Comprehensive metabolic panel   Result Value Ref Range    Sodium 137 133 - 144 mmol/L    Potassium 2.9 (L) 3.4 - 5.3 mmol/L    Chloride 104 94 - 109 mmol/L    Carbon Dioxide (CO2) 16 (L) 20 - 32 mmol/L    Anion Gap 17 (H) 3 - 14 mmol/L    Urea Nitrogen 8 7 - 30 mg/dL    Creatinine 0.57 (L) 0.66 - 1.25 mg/dL    Calcium 8.8 8.5 - 10.1 mg/dL    Glucose 112 (H) 70 - 99 mg/dL    Alkaline Phosphatase 350 (H) 40 - 150 U/L     (H) 0 - 45 U/L    ALT 79 (H) 0 - 70 U/L    Protein Total 9.8 (H) 6.8 - 8.8 g/dL    Albumin 3.7 3.4 - 5.0 g/dL    Bilirubin Total 1.8 (H) 0.2 - 1.3 mg/dL    GFR Estimate >90 >60 mL/min/1.73m2   Magnesium   Result Value Ref Range    Magnesium 1.8 1.6 - 2.3 mg/dL   Troponin I   Result Value Ref Range    Troponin I <0.015 0.000 - 0.045 ug/L   Ethyl Alcohol Level   Result Value Ref Range    Alcohol ethyl <0.01 <=0.01 g/dL   TSH with free T4 reflex   Result Value Ref Range    TSH 5.04 (H) 0.40 - 4.00 mU/L   Lipase   Result Value Ref Range    Lipase 149 73 - 393 U/L    Acetaminophen level   Result Value Ref Range    Acetaminophen <2 (L) 10 - 30 mg/L   Salicylate level   Result Value Ref Range    Salicylate <2 <20 mg/dL   CBC with platelets and differential   Result Value Ref Range    WBC Count 11.1 (H) 4.0 - 11.0 10e3/uL    RBC Count 4.72 4.40 - 5.90 10e6/uL    Hemoglobin 15.5 13.3 - 17.7 g/dL    Hematocrit 47.2 40.0 - 53.0 %     78 - 100 fL    MCH 32.8 26.5 - 33.0 pg    MCHC 32.8 31.5 - 36.5 g/dL    RDW 14.1 10.0 - 15.0 %    Platelet Count 159 150 - 450 10e3/uL    % Neutrophils 71 %    % Lymphocytes 17 %    % Monocytes 9 %    % Eosinophils 1 %    % Basophils 1 %    % Immature Granulocytes 1 %    NRBCs per 100 WBC 0 <1 /100    Absolute Neutrophils 8.0 1.6 - 8.3 10e3/uL    Absolute Lymphocytes 1.9 0.8 - 5.3 10e3/uL    Absolute Monocytes 1.0 0.0 - 1.3 10e3/uL    Absolute Eosinophils 0.1 0.0 - 0.7 10e3/uL    Absolute Basophils 0.1 0.0 - 0.2 10e3/uL    Absolute Immature Granulocytes 0.1 (H) <=0.0 10e3/uL    Absolute NRBCs 0.0 10e3/uL   [place] Draw    Narrative    The following orders were created for panel order [place] Draw.  Procedure                               Abnormality         Status                     ---------                               -----------         ------                     Extra Purple Top Tube[[id number]]                            Final result                 Please view results for these tests on the individual orders.   Extra Purple Top Tube   Result Value Ref Range    Hold Specimen JI    iStat Gases Electrolytes ICA Glucose Venous, POCT   Result Value Ref Range    CPB Applied No     Hematocrit POCT 47 40 - 53 %    Calcium, Ionized Whole Blood POCT 4.8 4.4 - 5.2 mg/dL    Glucose Whole Blood POCT 123 (H) 70 - 99 mg/dL    Bicarbonate Venous POCT 16 (L) 21 - 28 mmol/L    Hemoglobin POCT 16.0 13.3 - 17.7 g/dL    Potassium POCT 2.9 (L) 3.4 - 5.3 mmol/L    Sodium POCT 140 133 - 144 mmol/L    pCO2 Venous POCT 40 40 - 50 mm Hg    pO2 Venous POCT 70 (H)  25 - 47 mm Hg    pH Venous POCT 7.22 (L) 7.32 - 7.43    O2 Sat, Venous POCT 90 (L) 94 - 100 %   T4 free   Result Value Ref Range    Free T4 1.06 0.76 - 1.46 ng/dL   Creatinine POCT   Result Value Ref Range    Creatinine POCT 0.6 (L) 0.7 - 1.3 mg/dL    GFR, ESTIMATED POCT >60 >60 mL/min/1.73m2   iStat Gases (lactate) venous, POCT   Result Value Ref Range    Lactic Acid POCT 12.1 (HH) <=2.0 mmol/L    Bicarbonate Venous POCT 17 (L) 21 - 28 mmol/L    O2 Sat, Venous POCT 91 (L) 94 - 100 %    pCO2V Venous POCT 42 40 - 50 mm Hg    pH Venous POCT 7.21 (L) 7.32 - 7.43    pO2 Venous POCT 72 (H) 25 - 47 mm Hg   Asymptomatic COVID-19 Virus (Coronavirus) by PCR Nasopharyngeal    Specimen: Nasopharyngeal; Swab   Result Value Ref Range    SARS CoV2 PCR Negative Negative    Narrative    Testing was performed using the Xpert Xpress SARS-CoV-2 Assay on the  Cepheid Gene-Xpert Instrument Systems. Additional information about  this Emergency Use Authorization (EUA) assay can be found via the Lab  Guide. This test should be ordered for the detection of SARS-CoV-2 in  individuals who meet SARS-CoV-2 clinical and/or epidemiological  criteria. Test performance is unknown in asymptomatic patients. This  test is for in vitro diagnostic use under the FDA EUA for  laboratories certified under CLIA to perform high complexity testing.  This test has not been FDA cleared or approved. A negative result  does not rule out the presence of PCR inhibitors in the specimen or  target RNA in concentration below the limit of detection for the  assay. The possibility of a false negative should be considered if  the patient's recent exposure or clinical presentation suggests  COVID-19. This test was validated by the [facility]. This laboratory is certified under  the Clinical Laboratory Improvement Amendments of 1988 (CLIA-88) as  qualified to perform high complexity laboratory testing.     Head CT w/o  contrast    Narrative    EXAM: CT HEAD W/O CONTRAST, CT LUMBAR SPINE W/O CONTRAST, CT CERVICAL SPINE W/O CONTRAST, CT THORACIC SPINE W/O CONTRAST  LOCATION: [facility]  DATE/TIME: 10/10/2021 10:56 PM    INDICATION: Fall, confusion. Pain. Tripped and fell.  COMPARISON: None.  TECHNIQUE:   1) Routine CT Head without IV contrast. Multiplanar reformats. Dose reduction techniques were used.   2) Routine CT Cervical Spine without IV contrast. Multiplanar reformats. Dose reduction techniques were used.   3) Routine CT Thoracic Spine without IV contrast. Multiplanar reformats. Dose reduction techniques were used.   4) Routine CT Lumbar Spine without IV contrast. Multiplanar reformats. Dose reduction techniques were used.     FINDINGS:   HEAD CT:   INTRACRANIAL CONTENTS: No intracranial hemorrhage, extraaxial collection, or mass effect. No CT evidence of acute infarct. 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 Moderate generalized volume loss. No hydrocephalus.     VISUALIZED ORBITS/SINUSES/MASTOIDS: No intraorbital abnormality. No paranasal sinus mucosal disease. No middle ear or mastoid effusion.    BONES/SOFT TISSUES: No acute abnormality.    CERVICAL SPINE CT:  VERTEBRA: Mild dextroscoliosis. Normal vertebral body heights and alignment. No fracture or posttraumatic subluxation.     CANAL/FORAMINA: No canal or neural foraminal stenosis.    PARASPINAL: No extraspinal abnormality. Visualized lung fields are clear.    THORACIC SPINE CT:  VERTEBRA: Mild age-indeterminate compression deformities involving the superior endplate of T2-T4. Moderate compression deformities of T5 and to a lesser extent T6. Severe compression fracture of T7 with slight buckling of the posterior cortex into the   ventral canal. Moderate to severe compression fracture of T9 with minimal buckling of the posterior cortex into the ventral canal. Severe compression fracture  of T11 with slight buckling of the posterior cortex into the ventral canal. Chronic fracture of   the posterior left third rib.     CANAL/FORAMINA: No canal or neural foraminal stenosis.    PARASPINAL: No extraspinal abnormality.    LUMBAR SPINE CT:  VERTEBRA: 5 lumbar type vertebra. Moderate age-indeterminate burst fracture of L1 with up to 60% loss of height centrally. 5 mm retropulsion of the posterior cortex into the ventral canal. Moderate to severe compression fracture involving the central   superior and inferior endplates of L3 and inferior endplate of L4. Healing fractures involving the left L1 and L2 transverse processes.     CANAL/FORAMINA: No high-grade central canal stenosis and mild central canal stenosis at L1. Moderate to severe foraminal stenosis bilaterally at L4-L5 and L5-S1.    PARASPINAL: No extraspinal abnormality.      Impression    IMPRESSION:  HEAD CT:  1.  No acute intracranial process.  2.  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  3.  Prominent atrophy for age.    CERVICAL SPINE CT:  1.  No fracture or posttraumatic subluxation.  2.  No high-grade spinal canal or neural foraminal stenosis.    THORACIC SPINE CT:  1.  Age-indeterminate severe compression deformities of T7 and T11.  2.  Age-indeterminate moderate to severe compression deformities of T5 and T9.  3.  Age-indeterminate moderate compression fracture of T6.   4.  Age-indeterminate mild compression deformities involving the superior endplates of T2-T4.  5.  No high-grade central canal stenosis or neural foraminal narrowing.    LUMBAR SPINE CT:  1.  Age-indeterminate compression fractures of L1, L3 and L4 as described above.  2.  Moderate to severe foraminal stenosis bilaterally at L4-L5 and L5-S1.  3.  Healing left-sided transverse process fractures at L1 and L2.   Cervical spine CT w/o contrast    Narrative    EXAM: CT HEAD W/O CONTRAST, CT LUMBAR SPINE W/O CONTRAST, CT CERVICAL SPINE W/O  CONTRAST, CT THORACIC SPINE W/O CONTRAST  LOCATION: [facility]  DATE/TIME: 10/10/2021 10:56 PM    INDICATION: Fall, confusion. Pain. Tripped and fell.  COMPARISON: None.  TECHNIQUE:   1) Routine CT Head without IV contrast. Multiplanar reformats. Dose reduction techniques were used.   2) Routine CT Cervical Spine without IV contrast. Multiplanar reformats. Dose reduction techniques were used.   3) Routine CT Thoracic Spine without IV contrast. Multiplanar reformats. Dose reduction techniques were used.   4) Routine CT Lumbar Spine without IV contrast. Multiplanar reformats. Dose reduction techniques were used.     FINDINGS:   HEAD CT:   INTRACRANIAL CONTENTS: No intracranial hemorrhage, extraaxial collection, or mass effect. No CT evidence of acute infarct. 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 Moderate generalized volume loss. No hydrocephalus.     VISUALIZED ORBITS/SINUSES/MASTOIDS: No intraorbital abnormality. No paranasal sinus mucosal disease. No middle ear or mastoid effusion.    BONES/SOFT TISSUES: No acute abnormality.    CERVICAL SPINE CT:  VERTEBRA: Mild dextroscoliosis. Normal vertebral body heights and alignment. No fracture or posttraumatic subluxation.     CANAL/FORAMINA: No canal or neural foraminal stenosis.    PARASPINAL: No extraspinal abnormality. Visualized lung fields are clear.    THORACIC SPINE CT:  VERTEBRA: Mild age-indeterminate compression deformities involving the superior endplate of T2-T4. Moderate compression deformities of T5 and to a lesser extent T6. Severe compression fracture of T7 with slight buckling of the posterior cortex into the   ventral canal. Moderate to severe compression fracture of T9 with minimal buckling of the posterior cortex into the ventral canal. Severe compression fracture of T11 with slight buckling of the posterior cortex into the ventral canal. Chronic fracture of   the  posterior left third rib.     CANAL/FORAMINA: No canal or neural foraminal stenosis.    PARASPINAL: No extraspinal abnormality.    LUMBAR SPINE CT:  VERTEBRA: 5 lumbar type vertebra. Moderate age-indeterminate burst fracture of L1 with up to 60% loss of height centrally. 5 mm retropulsion of the posterior cortex into the ventral canal. Moderate to severe compression fracture involving the central   superior and inferior endplates of L3 and inferior endplate of L4. Healing fractures involving the left L1 and L2 transverse processes.     CANAL/FORAMINA: No high-grade central canal stenosis and mild central canal stenosis at L1. Moderate to severe foraminal stenosis bilaterally at L4-L5 and L5-S1.    PARASPINAL: No extraspinal abnormality.      Impression    IMPRESSION:  HEAD CT:  1.  No acute intracranial process.  2.  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  3.  Prominent atrophy for age.    CERVICAL SPINE CT:  1.  No fracture or posttraumatic subluxation.  2.  No high-grade spinal canal or neural foraminal stenosis.    THORACIC SPINE CT:  1.  Age-indeterminate severe compression deformities of T7 and T11.  2.  Age-indeterminate moderate to severe compression deformities of T5 and T9.  3.  Age-indeterminate moderate compression fracture of T6.   4.  Age-indeterminate mild compression deformities involving the superior endplates of T2-T4.  5.  No high-grade central canal stenosis or neural foraminal narrowing.    LUMBAR SPINE CT:  1.  Age-indeterminate compression fractures of L1, L3 and L4 as described above.  2.  Moderate to severe foraminal stenosis bilaterally at L4-L5 and L5-S1.  3.  Healing left-sided transverse process fractures at L1 and L2.   CT Chest/Abdomen/Pelvis w Contrast   Result Value Ref Range    Radiologist flags Enlarged lymph node     Narrative    EXAM: CT CHEST/ABDOMEN/PELVIS W CONTRAST  LOCATION: [facility]  DATE/TIME: 10/10/2021 10:58 PM    INDICATION: Diffuse pain after fall on back. Tachycardia and confusion.  COMPARISON: None.  TECHNIQUE: CT scan of the chest, abdomen, and pelvis was performed following injection of IV contrast. Multiplanar reformats were obtained. Dose reduction techniques were used.   CONTRAST: 100 mL [id number].    FINDINGS:   LUNGS AND PLEURA: No pneumothorax or pleural fluid. Patchy bilateral areas of groundglass opacity and atelectasis in both lungs.    MEDIASTINUM/AXILLAE: No evidence for acute thoracic aortic injury. No pneumomediastinum or significant fluid in the mediastinum. No lymphadenopathy. No pericardial fluid. Normal caliber esophagus. No chest wall hematoma.    CORONARY ARTERY CALCIFICATION: None.    HEPATOBILIARY: Hepatic cirrhosis. Hepatic steatosis. No hepatic laceration or perihepatic fluid. Cholecystectomy without significant biliary dilatation allowing for postcholecystectomy state.    PANCREAS: Unremarkable.    SPLEEN: Normal size spleen. No splenic laceration or perisplenic hematoma.    ADRENAL GLANDS: Unremarkable. No adrenal hemorrhage.    KIDNEYS/BLADDER: Symmetric contrast enhancement to both kidneys. No renal laceration or perinephric hematoma. Bladder unremarkable.    BOWEL: No bowel dilatation or inflammatory change. Appendix unremarkable.    LYMPH NODES: 1.2 cm lymph node in the gastrohepatic ligament. No other mildly enlarged lymph nodes.    VASCULATURE: Normal caliber aorta. Atherosclerotic vascular calcification.    PELVIC ORGANS: No free fluid.    MUSCULOSKELETAL: Multiple age-indeterminate compression fractures throughout the thoracic and lumbar spine at T5, T6, T7, T9, T11, L1, L3 and L4. Subacute healing oblique fracture involving the proximal sternum with bridging callus formation. No acute   rib fractures. Multiple healed rib fractures bilaterally. Old healed right clavicular fracture.      Impression    IMPRESSION:  1.  Multiple age-indeterminate  compression fractures throughout the thoracic and lumbar spine as detailed above. Please see dedicated CT reports from 10/10/2021 for further details.    2.  No evidence for acute injury elsewhere within the chest, abdomen or pelvis.    3.  Patchy bilateral groundglass opacities and atelectasis in both lungs allowing for motion artifact. Underlying infectious etiologies including COVID-19 pneumonia cannot be excluded.    4.  Hepatic cirrhosis and hepatic steatosis.    5.  Enlarged lymph node at the gastrohepatic ligament measuring 1.2 cm. Continued long-term follow-up recommended in 6-12 months.      [Recommend Follow Up: Enlarged lymph node]    This report will be copied to the [facility] to ensure a provider acknowledges the finding.      CT Thoracic Spine w/o Contrast    Narrative    EXAM: CT HEAD W/O CONTRAST, CT LUMBAR SPINE W/O CONTRAST, CT CERVICAL SPINE W/O CONTRAST, CT THORACIC SPINE W/O CONTRAST  LOCATION: [facility]  DATE/TIME: 10/10/2021 10:56 PM    INDICATION: Fall, confusion. Pain. Tripped and fell.  COMPARISON: None.  TECHNIQUE:   1) Routine CT Head without IV contrast. Multiplanar reformats. Dose reduction techniques were used.   2) Routine CT Cervical Spine without IV contrast. Multiplanar reformats. Dose reduction techniques were used.   3) Routine CT Thoracic Spine without IV contrast. Multiplanar reformats. Dose reduction techniques were used.   4) Routine CT Lumbar Spine without IV contrast. Multiplanar reformats. Dose reduction techniques were used.     FINDINGS:   HEAD CT:   INTRACRANIAL CONTENTS: No intracranial hemorrhage, extraaxial collection, or mass effect. No CT evidence of acute infarct. 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 Moderate generalized volume loss. No hydrocephalus.     VISUALIZED ORBITS/SINUSES/MASTOIDS: No intraorbital abnormality. No paranasal sinus mucosal disease. No middle  ear or mastoid effusion.    BONES/SOFT TISSUES: No acute abnormality.    CERVICAL SPINE CT:  VERTEBRA: Mild dextroscoliosis. Normal vertebral body heights and alignment. No fracture or posttraumatic subluxation.     CANAL/FORAMINA: No canal or neural foraminal stenosis.    PARASPINAL: No extraspinal abnormality. Visualized lung fields are clear.    THORACIC SPINE CT:  VERTEBRA: Mild age-indeterminate compression deformities involving the superior endplate of T2-T4. Moderate compression deformities of T5 and to a lesser extent T6. Severe compression fracture of T7 with slight buckling of the posterior cortex into the   ventral canal. Moderate to severe compression fracture of T9 with minimal buckling of the posterior cortex into the ventral canal. Severe compression fracture of T11 with slight buckling of the posterior cortex into the ventral canal. Chronic fracture of   the posterior left third rib.     CANAL/FORAMINA: No canal or neural foraminal stenosis.    PARASPINAL: No extraspinal abnormality.    LUMBAR SPINE CT:  VERTEBRA: 5 lumbar type vertebra. Moderate age-indeterminate burst fracture of L1 with up to 60% loss of height centrally. 5 mm retropulsion of the posterior cortex into the ventral canal. Moderate to severe compression fracture involving the central   superior and inferior endplates of L3 and inferior endplate of L4. Healing fractures involving the left L1 and L2 transverse processes.     CANAL/FORAMINA: No high-grade central canal stenosis and mild central canal stenosis at L1. Moderate to severe foraminal stenosis bilaterally at L4-L5 and L5-S1.    PARASPINAL: No extraspinal abnormality.      Impression    IMPRESSION:  HEAD CT:  1.  No acute intracranial process.  2.  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  3.  Prominent atrophy for age.    CERVICAL SPINE CT:  1.  No fracture or posttraumatic subluxation.  2.  No high-grade spinal canal or neural foraminal  stenosis.    THORACIC SPINE CT:  1.  Age-indeterminate severe compression deformities of T7 and T11.  2.  Age-indeterminate moderate to severe compression deformities of T5 and T9.  3.  Age-indeterminate moderate compression fracture of T6.   4.  Age-indeterminate mild compression deformities involving the superior endplates of T2-T4.  5.  No high-grade central canal stenosis or neural foraminal narrowing.    LUMBAR SPINE CT:  1.  Age-indeterminate compression fractures of L1, L3 and L4 as described above.  2.  Moderate to severe foraminal stenosis bilaterally at L4-L5 and L5-S1.  3.  Healing left-sided transverse process fractures at L1 and L2.   Lumbar spine CT w/o contrast    Narrative    EXAM: CT HEAD W/O CONTRAST, CT LUMBAR SPINE W/O CONTRAST, CT CERVICAL SPINE W/O CONTRAST, CT THORACIC SPINE W/O CONTRAST  LOCATION: [facility]  DATE/TIME: 10/10/2021 10:56 PM    INDICATION: Fall, confusion. Pain. Tripped and fell.  COMPARISON: None.  TECHNIQUE:   1) Routine CT Head without IV contrast. Multiplanar reformats. Dose reduction techniques were used.   2) Routine CT Cervical Spine without IV contrast. Multiplanar reformats. Dose reduction techniques were used.   3) Routine CT Thoracic Spine without IV contrast. Multiplanar reformats. Dose reduction techniques were used.   4) Routine CT Lumbar Spine without IV contrast. Multiplanar reformats. Dose reduction techniques were used.     FINDINGS:   HEAD CT:   INTRACRANIAL CONTENTS: No intracranial hemorrhage, extraaxial collection, or mass effect. No CT evidence of acute infarct. 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 Moderate generalized volume loss. No hydrocephalus.     VISUALIZED ORBITS/SINUSES/MASTOIDS: No intraorbital abnormality. No paranasal sinus mucosal disease. No middle ear or mastoid effusion.    BONES/SOFT TISSUES: No acute abnormality.    CERVICAL SPINE CT:  VERTEBRA: Mild  dextroscoliosis. Normal vertebral body heights and alignment. No fracture or posttraumatic subluxation.     CANAL/FORAMINA: No canal or neural foraminal stenosis.    PARASPINAL: No extraspinal abnormality. Visualized lung fields are clear.    THORACIC SPINE CT:  VERTEBRA: Mild age-indeterminate compression deformities involving the superior endplate of T2-T4. Moderate compression deformities of T5 and to a lesser extent T6. Severe compression fracture of T7 with slight buckling of the posterior cortex into the   ventral canal. Moderate to severe compression fracture of T9 with minimal buckling of the posterior cortex into the ventral canal. Severe compression fracture of T11 with slight buckling of the posterior cortex into the ventral canal. Chronic fracture of   the posterior left third rib.     CANAL/FORAMINA: No canal or neural foraminal stenosis.    PARASPINAL: No extraspinal abnormality.    LUMBAR SPINE CT:  VERTEBRA: 5 lumbar type vertebra. Moderate age-indeterminate burst fracture of L1 with up to 60% loss of height centrally. 5 mm retropulsion of the posterior cortex into the ventral canal. Moderate to severe compression fracture involving the central   superior and inferior endplates of L3 and inferior endplate of L4. Healing fractures involving the left L1 and L2 transverse processes.     CANAL/FORAMINA: No high-grade central canal stenosis and mild central canal stenosis at L1. Moderate to severe foraminal stenosis bilaterally at L4-L5 and L5-S1.    PARASPINAL: No extraspinal abnormality.      Impression    IMPRESSION:  HEAD CT:  1.  No acute intracranial process.  2.  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  3.  Prominent atrophy for age.    CERVICAL SPINE CT:  1.  No fracture or posttraumatic subluxation.  2.  No high-grade spinal canal or neural foraminal stenosis.    THORACIC SPINE CT:  1.  Age-indeterminate severe compression deformities of T7 and T11.  2.   Age-indeterminate moderate to severe compression deformities of T5 and T9.  3.  Age-indeterminate moderate compression fracture of T6.   4.  Age-indeterminate mild compression deformities involving the superior endplates of T2-T4.  5.  No high-grade central canal stenosis or neural foraminal narrowing.    LUMBAR SPINE CT:  1.  Age-indeterminate compression fractures of L1, L3 and L4 as described above.  2.  Moderate to severe foraminal stenosis bilaterally at L4-L5 and L5-S1.  3.  Healing left-sided transverse process fractures at L1 and L2.   Potassium   Result Value Ref Range    Potassium 3.4 3.4 - 5.3 mmol/L       Studies:  Lumbar spine CT w/o contrast   Final Result   IMPRESSION:   HEAD CT:   1.  No acute intracranial process.   2.  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   3.  Prominent atrophy for age.      CERVICAL SPINE CT:   1.  No fracture or posttraumatic subluxation.   2.  No high-grade spinal canal or neural foraminal stenosis.      THORACIC SPINE CT:   1.  Age-indeterminate severe compression deformities of T7 and T11.   2.  Age-indeterminate moderate to severe compression deformities of T5 and T9.   3.  Age-indeterminate moderate compression fracture of T6.    4.  Age-indeterminate mild compression deformities involving the superior endplates of T2-T4.   5.  No high-grade central canal stenosis or neural foraminal narrowing.      LUMBAR SPINE CT:   1.  Age-indeterminate compression fractures of L1, L3 and L4 as described above.   2.  Moderate to severe foraminal stenosis bilaterally at L4-L5 and L5-S1.   3.  Healing left-sided transverse process fractures at L1 and L2.      CT Thoracic Spine w/o Contrast   Final Result   IMPRESSION:   HEAD CT:   1.  No acute intracranial process.   2.  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   3.  Prominent atrophy for age.      CERVICAL SPINE CT:   1.  No fracture or posttraumatic  subluxation.   2.  No high-grade spinal canal or neural foraminal stenosis.      THORACIC SPINE CT:   1.  Age-indeterminate severe compression deformities of T7 and T11.   2.  Age-indeterminate moderate to severe compression deformities of T5 and T9.   3.  Age-indeterminate moderate compression fracture of T6.    4.  Age-indeterminate mild compression deformities involving the superior endplates of T2-T4.   5.  No high-grade central canal stenosis or neural foraminal narrowing.      LUMBAR SPINE CT:   1.  Age-indeterminate compression fractures of L1, L3 and L4 as described above.   2.  Moderate to severe foraminal stenosis bilaterally at L4-L5 and L5-S1.   3.  Healing left-sided transverse process fractures at L1 and L2.      CT Chest/Abdomen/Pelvis w Contrast   Final Result   IMPRESSION:   1.  Multiple age-indeterminate compression fractures throughout the thoracic and lumbar spine as detailed above. Please see dedicated CT reports from 10/10/2021 for further details.      2.  No evidence for acute injury elsewhere within the chest, abdomen or pelvis.      3.  Patchy bilateral groundglass opacities and atelectasis in both lungs allowing for motion artifact. Underlying infectious etiologies including COVID-19 pneumonia cannot be excluded.      4.  Hepatic cirrhosis and hepatic steatosis.      5.  Enlarged lymph node at the gastrohepatic ligament measuring 1.2 cm. Continued long-term follow-up recommended in 6-12 months.         [Recommend Follow Up: Enlarged lymph node]      This report will be copied to the [facility] to ensure a provider acknowledges the finding.          Cervical spine CT w/o contrast   Final Result   IMPRESSION:   HEAD CT:   1.  No acute intracranial process.   2.  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   3.  Prominent atrophy for age.      CERVICAL SPINE CT:   1.  No fracture or posttraumatic subluxation.   2.  No high-grade spinal canal or  neural foraminal stenosis.      THORACIC SPINE CT:   1.  Age-indeterminate severe compression deformities of T7 and T11.   2.  Age-indeterminate moderate to severe compression deformities of T5 and T9.   3.  Age-indeterminate moderate compression fracture of T6.    4.  Age-indeterminate mild compression deformities involving the superior endplates of T2-T4.   5.  No high-grade central canal stenosis or neural foraminal narrowing.      LUMBAR SPINE CT:   1.  Age-indeterminate compression fractures of L1, L3 and L4 as described above.   2.  Moderate to severe foraminal stenosis bilaterally at L4-L5 and L5-S1.   3.  Healing left-sided transverse process fractures at L1 and L2.      Head CT w/o contrast   Final Result   IMPRESSION:   HEAD CT:   1.  No acute intracranial process.   2.  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   3.  Prominent atrophy for age.      CERVICAL SPINE CT:   1.  No fracture or posttraumatic subluxation.   2.  No high-grade spinal canal or neural foraminal stenosis.      THORACIC SPINE CT:   1.  Age-indeterminate severe compression deformities of T7 and T11.   2.  Age-indeterminate moderate to severe compression deformities of T5 and T9.   3.  Age-indeterminate moderate compression fracture of T6.    4.  Age-indeterminate mild compression deformities involving the superior endplates of T2-T4.   5.  No high-grade central canal stenosis or neural foraminal narrowing.      LUMBAR SPINE CT:   1.  Age-indeterminate compression fractures of L1, L3 and L4 as described above.   2.  Moderate to severe foraminal stenosis bilaterally at L4-L5 and L5-S1.   3.  Healing left-sided transverse process fractures at L1 and L2.          Discussed with trauma staff on call, Dr. Arredondo.    Jayden Richardson MD  [facility]

## 2021-10-11 NOTE — PLAN OF CARE
Admission          10/10/2021  8:34 PM  -----------------------------------------------------------  Reason for admission: Fall with seizure and possible ETOH withdraw  Primary team notified of pt arrival.  Admitted from: [facility] ED  Via: stretcher  Belongings: Placed in closet; valuables remain with patient   Admission Profile: complete  Teaching: orientation to unit and call light- call light within reach, call don't fall, use of console, meal times, when to call for the RN, and enforced importance of safety   Access: PIV x1  Telemetry:Placed on pt  Ht./Wt.: complete  Code Status verified on armband: yes  2 RN Skin Assessment Completed By: Kristin WICK and Carolyn EVERETT, generalized rash with dry skin flaking, rash located on bilateral arms, trunk, worse on lower back and buttocks region.   Med Rec completed: yes  Bed surface reassessed with algorithm and charted: yes  New bed surface ordered: no  Suction/Ambu bag/Flowmeter at bedside: yes    Pt status:   Neuro: A&Ox3-4, sometimes disoriented to situation, mentation waxes/wanes, slept most of shift, cooperative with cares. CIWAI score of 0, neuro checks unchanged.   Cardiac: SR. VSS. Denies chest pain and SOB.    Respiratory: Sating >95% on RA, lung sounds clear.   GI/: Adequate urine output via urinal, no BM during shift.   Diet/appetite: Tolerating clear liquid diet. Eating well. Denies nausea.   Activity: Bedrest, needs back brace when out of bed.   Pain: At acceptable level on current regimen.   Skin: No new deficits noted, see above.   LDA's: Left PIV x1.     Plan: Continue with POC. Notify primary team with changes.

## 2021-10-11 NOTE — PROGRESS NOTES
Addiction Team Social Work Note    Date of  Intervention: 10/11/21  Collaborated with:  Dr. Morales, Addiction Attending; Dr. Nunn, Addiction Resident    Referred by:  Attending physician  Reason for consult: Alcohol; Linkage to OP care, addiction management, referral to treatment (Rule 25)  Patient information:  Pt is a 56 year old man with a history of substance use disorder who presents with seizure and falls.    Intervention:  Participated in Addiction rounds this morning after chart review.    1450:  Writer attempted to meet with pt to introduce self and role but pt sleeping soundly.  Wrote brief note on pt's white board that Addiction team will be back tomorrow, 10/12, for another visit.    Assessment:  Pt sleeping today; will see pt tomorrow to begin assessment and rapport building.      Plan:    Anticipated Disposition:  Hospitalization.    Follow Up:  Writer will continue to follow.    Due to regulation of Title 42 of the Code of Federal Regulations (CFR) Part 2: Confidentiality laws apply to this note and the information wherein.  Thus, this note cannot be copy and pasted into any other health care staff's note nor can it be included in general medical records sent to ANY outside agency without the patient's written consent.    CHELSIE Joaquin  She/her/hers  Social Work Services  Addiction Team  [phone] work cell phone  [phone] pager  8:00am-4:30pm M/T/Th/F; Off Wednesday's

## 2021-10-11 NOTE — PROGRESS NOTES
"CLINICAL NUTRITION SERVICES - ASSESSMENT NOTE     Nutrition Prescription    RECOMMENDATIONS FOR MDs/PROVIDERS TO ORDER:  Diet advancement as medically able   Replace K+ to WNL     Malnutrition Status:    Unable to determine due to limited physical exam and limited intake history     Recommendations already ordered by Registered Dietitian (RD):  Ordered ensure clear daily   Changed mvi with minerals to MVI without minerals due to liver function     Future/Additional Recommendations:  Agree with folic acid, thiamine   Monitor ability for diet to advance to greater than clear liquids   Monitor appetite, use of supplement, need for additional scheduled snacks/supplements     REASON FOR ASSESSMENT  Mario Lamar II is a/an 56 year old male assessed by the dietitian for Provider Order - malnutrition     CLINICAL HISTORY   history of substance use disorder, suicidal ideation, depression who presents after seizure found to have multiple compression fractures, hepatic steatosis, A. fib with RVR (resolved) admitted for further work-up.    NUTRITION HISTORY  Patient resting at time of visit but woke easily. He reported eating well before admission. Usual intake is 3 meals per day. Reported height of 5'8 but was unsure of usual weight. Has not consumed anything today.   Physician came in during visit- NFPE not completed.     CURRENT NUTRITION ORDERS  Diet: Clear Liquid  Intake/Tolerance: 0%    LABS  K+ 3 (L),   Creatinine 0.47 (L)  Alkaline phosphatase 250 (H)    MEDICATIONS  Folic acid 1 mg  Thera-Vit-M   Thiamine 100 mg    ANTHROPOMETRICS  Height: 172.7 cm (5' 8\")  Most Recent Weight: 64.9 kg (143 lb 1.3 oz)    IBW: 70 kg  BMI: Normal BMI  Weight History:   Wt Readings from Last 15 Encounters:   10/11/21 64.9 kg (143 lb 1.3 oz)   07/22/21 59.1 kg (130 lb 6.4 oz)    03/26/20 67.4 kg (148 lb 9.6 oz) - care everywhere   06/23/18 70.8 kg (156 lb)   04/09/18 65.8 kg (145 lb)   02/18/18 71.2 kg (157 lb)   02/04/18 65.4 " kg (144 lb 3.2 oz)   12/01/17 68.7 kg (151 lb 6.4 oz)   07/14/17 69.9 kg (154 lb)   06/22/17 70.8 kg (156 lb)   12/18/16 66.2 kg (145 lb 14.4 oz)     Dosing Weight: 64.9 kg (lowest admission weight)     ASSESSED NUTRITION NEEDS  Estimated Energy Needs: 1205-1390+ kcals/day (25 - 30+ kcals/kg)  Justification: Maintenance  Estimated Protein Needs: 78-97 grams protein/day (1.2 - 1.5 grams of pro/kg)  Justification: Increased needs  Estimated Fluid Needs: 8929-3786 mL/day (1 mL/kcal)   Justification: Maintenance and Per provider pending fluid status    PHYSICAL FINDINGS  Poor dentition- no upper teeth     MALNUTRITION  % Intake: No decreased intake noted (per patient report)   % Weight Loss: None noted  Subcutaneous Fat Loss: None observed, limited to visual assessment of face/upper extremity   Muscle Loss: None observed, limited to visual assessment   Fluid Accumulation/Edema: None noted  Malnutrition Diagnosis: Unable to determine due to limited physical exam and limited intake history     NUTRITION DIAGNOSIS  Inadequate oral intake related to inability to consume adequate nutrition as evidenced by Clear liquid diet       INTERVENTIONS  Implementation  Nutrition Education: Not appropriate at this time due to patient condition   Collaboration with other providers  Medical food supplement therapy     Goals  Patient to consume % of nutritionally adequate meal trays TID, or the equivalent with supplements/snacks.     Monitoring/Evaluation  Progress toward goals will be monitored and evaluated per protocol.    Amaya Mckeon RD, GLORIA  6B pager: [phone]

## 2021-10-12 ENCOUNTER — APPOINTMENT (OUTPATIENT)
Dept: PHYSICAL THERAPY | Facility: CLINIC | Age: 56
End: 2021-10-12
Payer: COMMERCIAL

## 2021-10-12 LAB
ANION GAP SERPL CALCULATED.3IONS-SCNC: 4 MMOL/L (ref 3–14)
ATRIAL RATE - MUSE: 78 BPM
BUN SERPL-MCNC: 4 MG/DL (ref 7–30)
CALCIUM SERPL-MCNC: 8.2 MG/DL (ref 8.5–10.1)
CHLORIDE BLD-SCNC: 109 MMOL/L (ref 94–109)
CO2 SERPL-SCNC: 25 MMOL/L (ref 20–32)
CREAT SERPL-MCNC: 0.55 MG/DL (ref 0.66–1.25)
DIASTOLIC BLOOD PRESSURE - MUSE: NORMAL MMHG
GFR SERPL CREATININE-BSD FRML MDRD: >90 ML/MIN/1.73M2
GLUCOSE BLD-MCNC: 77 MG/DL (ref 70–99)
HIV 1+2 AB+HIV1 P24 AG SERPL QL IA: NONREACTIVE
HOLD SPECIMEN: NORMAL
INTERPRETATION ECG - MUSE: NORMAL
MAGNESIUM SERPL-MCNC: 1.5 MG/DL (ref 1.6–2.3)
P AXIS - MUSE: 30 DEGREES
PHOSPHATE SERPL-MCNC: 2.4 MG/DL (ref 2.5–4.5)
PHOSPHATE SERPL-MCNC: 2.5 MG/DL (ref 2.5–4.5)
POTASSIUM BLD-SCNC: 3.4 MMOL/L (ref 3.4–5.3)
POTASSIUM BLD-SCNC: 4.2 MMOL/L (ref 3.4–5.3)
PR INTERVAL - MUSE: 160 MS
QRS DURATION - MUSE: 84 MS
QT - MUSE: 414 MS
QTC - MUSE: 471 MS
R AXIS - MUSE: -11 DEGREES
SODIUM SERPL-SCNC: 138 MMOL/L (ref 133–144)
SYSTOLIC BLOOD PRESSURE - MUSE: NORMAL MMHG
T AXIS - MUSE: -5 DEGREES
VENTRICULAR RATE- MUSE: 78 BPM

## 2021-10-12 PROCEDURE — 36415 COLL VENOUS BLD VENIPUNCTURE: CPT | Performed by: INTERNAL MEDICINE

## 2021-10-12 PROCEDURE — 84100 ASSAY OF PHOSPHORUS: CPT | Performed by: STUDENT IN AN ORGANIZED HEALTH CARE EDUCATION/TRAINING PROGRAM

## 2021-10-12 PROCEDURE — 97161 PT EVAL LOW COMPLEX 20 MIN: CPT | Mod: GP

## 2021-10-12 PROCEDURE — 80048 BASIC METABOLIC PNL TOTAL CA: CPT | Performed by: STUDENT IN AN ORGANIZED HEALTH CARE EDUCATION/TRAINING PROGRAM

## 2021-10-12 PROCEDURE — 250N000011 HC RX IP 250 OP 636: Performed by: INTERNAL MEDICINE

## 2021-10-12 PROCEDURE — 250N000011 HC RX IP 250 OP 636: Performed by: STUDENT IN AN ORGANIZED HEALTH CARE EDUCATION/TRAINING PROGRAM

## 2021-10-12 PROCEDURE — 36415 COLL VENOUS BLD VENIPUNCTURE: CPT | Performed by: STUDENT IN AN ORGANIZED HEALTH CARE EDUCATION/TRAINING PROGRAM

## 2021-10-12 PROCEDURE — 250N000013 HC RX MED GY IP 250 OP 250 PS 637: Performed by: COUNSELOR

## 2021-10-12 PROCEDURE — 87389 HIV-1 AG W/HIV-1&-2 AB AG IA: CPT | Performed by: STUDENT IN AN ORGANIZED HEALTH CARE EDUCATION/TRAINING PROGRAM

## 2021-10-12 PROCEDURE — 999N000128 HC STATISTIC PERIPHERAL IV START W/O US GUIDANCE

## 2021-10-12 PROCEDURE — 99233 SBSQ HOSP IP/OBS HIGH 50: CPT | Mod: GC | Performed by: STUDENT IN AN ORGANIZED HEALTH CARE EDUCATION/TRAINING PROGRAM

## 2021-10-12 PROCEDURE — 97530 THERAPEUTIC ACTIVITIES: CPT | Mod: GP

## 2021-10-12 PROCEDURE — 120N000003 HC R&B IMCU UMMC

## 2021-10-12 PROCEDURE — 83735 ASSAY OF MAGNESIUM: CPT | Performed by: STUDENT IN AN ORGANIZED HEALTH CARE EDUCATION/TRAINING PROGRAM

## 2021-10-12 PROCEDURE — 258N000003 HC RX IP 258 OP 636: Performed by: INTERNAL MEDICINE

## 2021-10-12 PROCEDURE — 84132 ASSAY OF SERUM POTASSIUM: CPT | Performed by: INTERNAL MEDICINE

## 2021-10-12 PROCEDURE — 250N000013 HC RX MED GY IP 250 OP 250 PS 637: Performed by: STUDENT IN AN ORGANIZED HEALTH CARE EDUCATION/TRAINING PROGRAM

## 2021-10-12 PROCEDURE — 250N000013 HC RX MED GY IP 250 OP 250 PS 637: Performed by: INTERNAL MEDICINE

## 2021-10-12 PROCEDURE — 97116 GAIT TRAINING THERAPY: CPT | Mod: GP

## 2021-10-12 RX ORDER — MAGNESIUM SULFATE HEPTAHYDRATE 40 MG/ML
2 INJECTION, SOLUTION INTRAVENOUS ONCE
Status: COMPLETED | OUTPATIENT
Start: 2021-10-12 | End: 2021-10-12

## 2021-10-12 RX ORDER — POTASSIUM CHLORIDE 1.5 G/1.58G
20 POWDER, FOR SOLUTION ORAL ONCE
Status: COMPLETED | OUTPATIENT
Start: 2021-10-12 | End: 2021-10-12

## 2021-10-12 RX ADMIN — Medication 400 MG: at 07:54

## 2021-10-12 RX ADMIN — THIAMINE HCL TAB 100 MG 100 MG: 100 TAB at 07:52

## 2021-10-12 RX ADMIN — LEVETIRACETAM 750 MG: 750 TABLET, FILM COATED ORAL at 20:34

## 2021-10-12 RX ADMIN — HYDROCORTISONE: 25 CREAM TOPICAL at 07:58

## 2021-10-12 RX ADMIN — MAGNESIUM SULFATE IN WATER 2 G: 40 INJECTION, SOLUTION INTRAVENOUS at 22:29

## 2021-10-12 RX ADMIN — POTASSIUM CHLORIDE 20 MEQ: 1.5 POWDER, FOR SOLUTION ORAL at 09:23

## 2021-10-12 RX ADMIN — HYDROCORTISONE: 25 CREAM TOPICAL at 11:27

## 2021-10-12 RX ADMIN — THIAMINE HYDROCHLORIDE 500 MG: 100 INJECTION, SOLUTION INTRAMUSCULAR; INTRAVENOUS at 07:57

## 2021-10-12 RX ADMIN — Medication 400 MG: at 20:33

## 2021-10-12 RX ADMIN — FOLIC ACID 1 MG: 1 TABLET ORAL at 07:54

## 2021-10-12 RX ADMIN — HYDROCORTISONE: 25 CREAM TOPICAL at 17:28

## 2021-10-12 RX ADMIN — POTASSIUM & SODIUM PHOSPHATES POWDER PACK 280-160-250 MG 1 PACKET: 280-160-250 PACK at 07:54

## 2021-10-12 RX ADMIN — LEVETIRACETAM 750 MG: 750 TABLET, FILM COATED ORAL at 07:54

## 2021-10-12 RX ADMIN — THIAMINE HYDROCHLORIDE 500 MG: 100 INJECTION, SOLUTION INTRAMUSCULAR; INTRAVENOUS at 20:32

## 2021-10-12 RX ADMIN — THIAMINE HYDROCHLORIDE 500 MG: 100 INJECTION, SOLUTION INTRAMUSCULAR; INTRAVENOUS at 14:39

## 2021-10-12 RX ADMIN — THERA TABS 1 TABLET: TAB at 07:54

## 2021-10-12 ASSESSMENT — ACTIVITIES OF DAILY LIVING (ADL)
ADLS_ACUITY_SCORE: 8

## 2021-10-12 ASSESSMENT — MIFFLIN-ST. JEOR: SCORE: 1410.5

## 2021-10-12 NOTE — PLAN OF CARE
Neuro: A&Ox4. Disoriented to situation. CIWA scoring of 0   Cardiac: SR. HR 90s-80s. -90s/70s.   Respiratory: Sating >95% on RA.  GI/: Adequate urine output. BM X2 formed/water/   Diet/appetite: Tolerating clear liquid diet. Clear ensure x1 was given.   Activity:  Assist of 1 to the bathroom.    Pain: At acceptable level on current regimen. Denies pain   Skin: No new deficits noted.  LDA's: L RAMÓN ACKERMAN.     Plan: Monitor for seizure activity. Neuro checks. Continue with POC. Notify primary team with changes.

## 2021-10-12 NOTE — PLAN OF CARE
End of Shift Summary. See flowsheets for vital signs and detailed assessment.    Changes this shift: Addiction medicine saw patient this AM. He also worked w/ therapy on walking w/ his TSLO brace. Denies s/s of withdrawal & pain. K+ replaced this AM, now 4.2. CIWAs discontinued. No s/s of seizure today.    Plan:  Continue to work w/ PT & OT. Transfer to med/surg when a bed is available. Discharge back to assisted housing?

## 2021-10-12 NOTE — PROGRESS NOTES
"   10/12/21 1000   Quick Adds   Type of Visit Initial PT Evaluation   Living Environment   People in home alone   Current Living Arrangements apartment   Home Accessibility no concerns   Transportation Anticipated public transportation   Living Environment Comments pt lives in some type of assistance housing, reports elevator access and tub/shower with shower chair present   Self-Care   Usual Activity Tolerance good   Current Activity Tolerance good   Regular Exercise No   Equipment Currently Used at Home shower chair   Activity/Exercise/Self-Care Comment pt IND in all cares and mobility at baseline.   Disability/Function   Hearing Difficulty or Deaf no   Concentrating, Remembering or Making Decisions Difficulty no   Difficulty Communicating no   Difficulty Eating/Swallowing no   Walking or Climbing Stairs Difficulty no   Dressing/Bathing Difficulty no   Toileting issues no   Doing Errands Independently Difficulty (such as shopping) no   Fall history within last six months yes   Number of times patient has fallen within last six months 2   Change in Functional Status Since Onset of Current Illness/Injury yes   General Information   Onset of Illness/Injury or Date of Surgery 10/10/21   Referring Physician Aquilino Frye MD   Pertinent History of Current Problem (include personal factors and/or comorbidities that impact the POC) per chart review, \"Mario Lamar II is a 56 year old man with a history of substance use disorder, suicidal ideation, depression who presents after seizure found to have multiple compression fractures, hepatic steatosis, A. fib with RVR (resolved) admitted for further work-up.\"   Existing Precautions/Restrictions fall;spinal   General Observations activity: up ad thai   Cognition   Affect/Mental Status (Cognition) confabulatory   Follows Commands (Cognition) WNL   Safety Deficit (Cognition) minimal deficit;at risk behavior observed;impulsivity;safety precautions awareness " "  Cognitive Status Comments slightly impulsive, slightly confused, but redirectable. Pt's cognitoin likely 2/2 alcohol use disorder and reports often \"forgets things\"   Pain Assessment   Patient Currently in Pain No   Integumentary/Edema   Integumentary/Edema no deficits were identifed   Posture    Posture Kyphosis   Range of Motion (ROM)   ROM Comment WFL   Strength   Strength Comments NT formally 2/2 spinal precautions, denies new weakness and WFL per functional mobility   Bed Mobility   Comment (Bed Mobility) IND but needs vc for log roll   Transfers   Transfer Safety Comments SBA STS to no AD, steady with rising   Gait/Stairs (Locomotion)   Comment (Gait/Stairs) pt ambultes with FWW and SBA to bathroom, steady with FWW   Balance   Balance Comments mild LOB with functional turning in bathroom, but able to self correct   Sensory Examination   Sensory Perception patient reports no sensory changes   Clinical Impression   Criteria for Skilled Therapeutic Intervention yes, treatment indicated   PT Diagnosis (PT) impaired functional mobility   Influenced by the following impairments impaired balance, spinal precautions adherence   Functional limitations due to impairments gait, high level community mobility   Clinical Presentation Stable/Uncomplicated   Clinical Presentation Rationale PMH, clinician impression   Clinical Decision Making (Complexity) low complexity   Therapy Frequency (PT) 3x/week   Predicted Duration of Therapy Intervention (days/wks) 1-2 days   Planned Therapy Interventions (PT) balance training;gait training;patient/family education;risk factor education;progressive activity/exercise   Risk & Benefits of therapy have been explained evaluation/treatment results reviewed;care plan/treatment goals reviewed;risks/benefits reviewed;current/potential barriers reviewed;participants voiced agreement with care plan;participants included;patient   Clinical Impression Comments see PT DC planner below   PT " Discharge Planning    PT Discharge Recommendation (DC Rec) home   PT Rationale for DC Rec pt near his baseline, up SBA with mild balance deviation that improve with increased ambulation and no overt LOB needing assist throughout. Pt has been wearing TLSO at home for ~2 months (likely indicated nature of spinal fx are sub-acute). Pt's adherence to spinal precautoins and TLSO wear variable 2/2 alcohol use impaired cognition. He is mobilizing near baseline and safe for home discharge when medically ready.   PT Brief overview of current status  SBA no AD- please encourage multiple ambulation bouts throughout day with Ax1   Total Evaluation Time   Total Evaluation Time (Minutes) 8

## 2021-10-12 NOTE — PLAN OF CARE
OT/6B: Orders received and acknowledged. Per discussion with Physical Therapist, patient with no acute OT needs. He is near baseline in areas of functional mobility transfers, cognition and activities of daily living. Will complete orders and defer to PT for discharge recommendations.

## 2021-10-12 NOTE — PROGRESS NOTES
[facility]   Tertiary Survey Progress Note     Date of Service (when I saw the patient): 10/12/2021     Assessment & Plan   Trauma mechanism: Fall  Time/date of injury: 8:00 PM 10/10/21  Known Injuries:    Age-indeterminate compression fractures T2,T3,T4,T5, T6, T9, T7,T1, L1,L3,L4     Brief HPI:   Mario Lamar II is a 56 year old male with a PMH significant for history of alcohol abuse with multiple recent admissions related to acute alcohol intoxication, depression, hepatic steatosis, pA. fib with RVR, who had multiple falls prior to admission found to have multiple thoracic vertebrae compression fractures unknown chronicity. He was witnessed to have a seizures in the Emergency Department. Given the known chronicity of his fractures and recent seizure activity he was admitted to the medicine service. Neurosurgery recommends a TLSO brace for his fractures.   A tertiary exam was completed today, no acute or other injuries identified.  Will defer further cares to Primary Service    Please call or page trauma with questions  Joel Contreras CNP  Job code pager [phone] (24 hours a day)  Use Medlio to text page (not text page compatible with [email or web address]).    Dial * * * [phone] then  [phone], wait for prompt and then enter call back number.       Physical Exam     [place] Coma Scale - Total 15/15    Physical Exam  Vitals and nursing note reviewed.   HENT:      Head: Normocephalic.      Nose: No congestion.      Mouth/Throat:      Mouth: Mucous membranes are moist.   Eyes:      Pupils: Pupils are equal, round, and reactive to light.   Cardiovascular:      Rate and Rhythm: Normal rate.   Pulmonary:      Effort: Pulmonary effort is normal.   Abdominal:      General: Abdomen is flat. There is no distension.      Palpations: Abdomen is soft.      Tenderness: There is no abdominal tenderness.   Skin:     General: Skin is warm and dry.      Capillary Refill: Capillary refill takes  less than 2 seconds.   Neurological:      General: No focal deficit present.      Mental Status: He is alert.   Psychiatric:         Mood and Affect: Mood normal.         Temp: 98.3  F (36.8  C) Temp src: Oral BP: 90/70 Pulse: 72   Resp: 18 SpO2: 98 % O2 Device: None (Room air)    Vitals:    10/10/21 2023 10/11/21 0709 10/12/21 0322   Weight: 65.8 kg (145 lb) 64.9 kg (143 lb 1.3 oz) 60.1 kg (132 lb 7.9 oz)     Vital Signs with Ranges  Temp:  [97.9  F (36.6  C)-98.4  F (36.9  C)] 98.3  F (36.8  C)  Pulse:  [64-98] 72  Resp:  [16-20] 18  BP: ()/(65-73) 90/70  SpO2:  [95 %-99 %] 98 %  I/O last 3 completed shifts:  In: 900 [P.O.:900]  Out: 1325 [Urine:1325]      JENNIFER Rubi CNP  To contact the trauma service use job code pager [phone],   Numeric texts or alpha text through [facility]

## 2021-10-12 NOTE — PROGRESS NOTES
Resident/Fellow Attestation   I, Aquilino Frye, was present with the medical/CHRISTINE student who participated in the service and in the documentation of the note.  I have verified the history and personally performed the physical exam and medical decision making.  I agree with the assessment and plan of care as documented in the note.      Aquilino Frye MD    PGY2  Date of Service (when I saw the patient): 10/12/21    [facility]    Progress Note - [place] 5 Service        Date of Admission:  10/10/2021    Changes today:  - discontinue CIWA protocol   - trauma evaluated and recommend TLSO brace, no further recs   - neurology recommends Keppra 750mg BID and outpatient follow up  - once ready for discharge can transition thiamine to  mg (for total of 5 days high dose) then 100 mg daily indefinitely   - PT/OT recommendations: home  - patient would like to see addiction medicine prior to discharge        Assessment & Plan              Mario Lamar II is a 56 year old man with a history of substance use disorder, suicidal ideation, depression who presents after seizure found to have multiple compression fractures, hepatic steatosis, A. fib with RVR (resolved) admitted for further work-up.     Seizure disorder  Patient had 2 witnessed seizures in the ED, with generalized shaking movements in upper deviation of his eyes.  Patient was Keppra loaded in the ED.  Patient reports history of seizures, alcohol withdrawal may be complicating this presentation, although less likely because he is not showing any other signs of withdrawal.  He is currently alert, conversant, following commands. 30 minute EEG on 10/11 revealed no clinical events.   - Neurology consulted, appreciate recs    - Keppra 750 mg BID     - follow up Neurology Clinic   - Seizure precautions    Substance use disorder  H/x Wernicke encephalopathy   Last drink per patient is a week ago, alcohol  level negative.  Patient reports he has used marijuana, smokes cigarettes but quit a year ago. He is unclear about IV drug use.  On admission, acetaminophen, salicylate level negative.  On exam patient without tremors, or anxiety, and was appropriately conversing without agitation. U tox negative. Clinical picture less concerning for alcohol withdrawal induced seizure, CIWA score has remained at zero > 24 hours and discontinued. Patient reports that he was suppose to be going to chem dep treatment in the future and state that he has attended treatment 3 times before. Per report from , patient was diagnosed with early wernicke encehalopathy previously at [facility].    - discontinue CIWA protocol    - Addiction medicine consult: has had trouble talking with patient because he has been sleeping when they visit, planning to see patient 10/14   - consult neurology, appreciate recs   - high dose thiamine: 500 mg IV TID x2days, then 250 mg IV daily x5d  - Discussed that if patient is ready for discharge, can transition to thiamine  mg for 5 days then 100 mg PO indefinitely    Atrial fibrillation with RVR, resolved  In the setting of seizure, resolved spontaneously. No history of prior echo or heart history. Concern with alcohol history there could be a component of cardiomyopathy or some unclear etiology playing into the onset of this atrial fibrillation. TTE (10/12) reassuring with EF of 55-60% and normal global function. Considered anticoagulation, but SRAVANTHI-VASc score of 0, did not indicate need for anticoagulation.    - continue to monitor clinically   - anticoagulation: not indicated at this time      Trauma  Compression fractures of T2 to L4, age-indeterminate  Moderate to severe foraminal stenosis lumbar vertebrae   Patient presented after fall at [employer], had seizure in the ED. Trauma has completed their tertiary exam on 10/12 and no concern for acute injury.    - Trauma consulted in emergency  department, appreciate recs   - Neurosurgery consulted, appreciate recommendations   - TLSO brace provided for patient       Syncope prior to fall  Patient had syncopal event witnessed at [employer] prior to admission. He does not remember this event and cannot recall if he had pre-syncopal symptoms. Differential for etiology for syncope includes atrial fibrillation with RVR, vasovagal, neurologic vs unclear etiology.  TTE (10/12) reassuring with EF of 55-60% and normal global function, less likely cardiac in nature.      Psoriasis   Patient presents with diffuse scaly, patchy lesions, most concentrated on entire back, elbows and lower legs, concerning for psoriasis. Reports that he deals with this rash itching at home, but has never taken medications for it.   - hydrocortisone 2.5% cream, can continue this treatment with PCP outpatient      Elevated liver enzymes   Direct hyperbilirubinemia   Coagulopathy of liver disease  Hepatic cirrhosis and steatosis  Likely secondary to alcohol use disorder.  He had been evaluated by hepatology and a prior admission in 2018 was not a candidate for transplant given acute alcohol abuse. Viral hepatitis panel (10/11) negative for acute infection of Hep B. HIV negative.    - hepatitis C screening pending        Hypokalemia  Patient has needed multiple doses of potassium replacement since admission.   - Potassium replacement protocol      Disposition:   - PT/OT recs: home        --Other problems--    Anion gap metabolic acidosis, resolved   Lactic acid 12 on admission, drawn right after seizure. Normalized to 1.0 on recheck. Leukocytosis reactive.  repeat VBG normalized.     Subclinical hypothyroidism  Elevated TSH, normal T4.  May be related to toxic substances.   - Obtain history determine if symptomatic to determine if needs treatment     Patchy bilateral groundglass opacities and atelectasis in lungs  Covid negative, no cough, hypoxia, white count, less concerning for infection.    -s/p one dose ceftri in the emergency department for CAP      Enlarged lymph node at gastrohepatic ligament 1.2 cm  -Follow-up in 6 to 12 months       Diet: Snacks/Supplements Adult: Ensure Clear; With Meals  Advance Diet as Tolerated: Regular Diet Adult    DVT Prophylaxis: Pneumatic Compression Devices  Ziegler Catheter: Not present  Fluids: PO  Central Lines: None  Code Status: Full Code      Disposition Plan   Expected discharge: 10/15/2021  5-7 days pending seizure disorder work up and management.      The patient's care was discussed with the Attending Physician, Chong Vasques, DO Samantha Eller  Medical Student  [address]  Securely message with the Vocera Web Console (learn more here)  Text page via [employer] Paging/Directory  Please see sign in/sign out for up to date coverage information    Clinically Significant Risk Factors Present on Admission                ______________________________________________________________________    Interval History   Patient reports that he feels back to his baseline and ready to go home. States that he does have stable housing to go home to. No pain or new concerns.     Data reviewed today: I reviewed all medications, new labs and imaging results over the last 24 hours.     Physical Exam     Vital Signs: Temp: 97.9  F (36.6  C) Temp src: Oral BP: (!) 110/97 Pulse: 73   Resp: 18 SpO2: 100 % O2 Device: None (Room air)    Weight: 132 lbs 7.94 oz  Constitutional: awake, alert and no apparent distress  Respiratory: No increased work of breathing, good air exchange, clear to auscultation bilaterally, no crackles or wheezing  Cardiovascular: regular rate and rhythm, normal S1 and S2 and no murmur noted  Skin: diffuse scaly, erythematous rash diffuse on back, elbows, lower legs, patient not noted to be itching as vigorously today  Musculoskeletal: spontaneously moves all 4 extremities   Neuropsychiatric: patient  conversational, alert and answering questions appropriately  Data   Recent Labs   Lab 10/12/21  1324 10/12/21  0527 10/11/21  2011 10/11/21  0629 10/11/21  0629 10/11/21  0119 10/10/21  2225 10/10/21  2216   WBC  --   --   --   --  8.4  --   --  11.1*   HGB  --   --   --   --  12.2*  --   --  15.5  16.0   MCV  --   --   --   --  97  --   --  100   PLT  --   --   --   --  103*  --   --  159   INR  --   --   --   --  1.33*  --   --  1.25*   NA  --  138  --   --  139  --   --  137  140   POTASSIUM 4.2 3.4 3.1*   < > 3.0*   < >  --  2.9*  2.9*   CHLORIDE  --  109  --   --  108  --   --  104   CO2  --  25  --   --  24  --   --  16*   BUN  --  4*  --   --  6*  --   --  8   CR  --  0.55*  --   --  0.47*  --  0.6* 0.57*   ANIONGAP  --  4  --   --  7  --   --  17*   JOHN  --  8.2*  --   --  8.0*  --   --  8.8   GLC  --  77  --   --  84  --   --  112*  123*   ALBUMIN  --   --   --   --  2.7*  --   --  3.7   PROTTOTAL  --   --   --   --  7.3  --   --  9.8*   BILITOTAL  --   --   --   --  1.9*  --   --  1.8*   ALKPHOS  --   --   --   --  250*  --   --  350*   ALT  --   --   --   --  46  --   --  79*   AST  --   --   --   --  106*  --   --  188*   LIPASE  --   --   --   --   --   --   --  149   TROPONIN  --   --   --   --   --   --   --  <0.015    < > = values in this interval not displayed.     No results found for this or any previous visit (from the past 24 hour(s)).

## 2021-10-12 NOTE — PROGRESS NOTES
"Addiction Team Social Work Assessment    Date of  Intervention: 10/12/21  Collaborated with:  Dr. Morales, Addiction Attending; Dr. Nunn, Addiction Resident    Referred by:  Attending physician  Reason for consult: Alcohol; Linkage to OP care, addiction management, referral to treatment (Rule 25)  Patient information:  Pt is a 56 year old man with a history of substance use disorder who presents with seizure and falls.    Writer met with pt around 1400 but pt sleeping soundly.  Writer called and spoke with [facility] bedside nurse, at 1500 and pt is awake.  Writer met with pt, introduced self, role and reason for the visit.      Information for this assessment was provided by the patient and after speaking with Pau, pt's [facility] [employer] , [phone].  Pt signed a NORBERTO and gave verbal permission for writer to call and speak with Pau.      Cultural/Ethnic/Camilla identities: Pt identifies as , affiliated with the [employer].      Current Housing: Pt states he lives alone at [facility].  Pau states that pt's housing is stable.  [facility] is Group Residential Housing, level 2 (includes some services) which is why pt is not eligible for the CADI waiver.      Support system: Pt states that he has several children, who are all \"grown up and moved away\".  He was unable to quantify how many children he had.  He stated he had several siblings but both brothers are  and 2 sisters have passed.  One sister is living.  Pt also mentions that his parents and uncles have also passed away.  (Pau states that pt's brothers  by suicide and one sister  of physical health problems.)  Writer validated pt has experienced many losses and pt states \"maybe this is why I drink\".     Professional? Pau, [employer] , is involved and supportive.  Pt has also a  named Marcelino Nixon with [facility], " [phone].     Current community resources? Pt states he gets bus cards and food shelf donations.  He also mentions meals as part of his services but writer uncertain if they are delivered or served by [facility].    Employment status: Did not assess.  Source of income: Did not assess.    Current Substance Use: Alcohol.  Pau states that she believes his drink of choice is Listerine because he will go out and purchase this specifically.  She states he will drink liquor and beer if provided by friends.      Treatment History: Pt cites multiple treatment and that the last one was 2-3 years ago in [place].      Primary Care Clinic: Pau states that pt sees a primary care provider at [facility].  The facesheet indicates Dr. Elle Aguilar but Pau states this is not pt's provider.      Mental health needs that warrant specific treatment?: Writer noted cognitive concerns during our interaction.  Pau also has noticed cognitive concerns but states that overall, pt is safe and that the [facility] Level 2 is an appropriate level of care for pt.      Coping skills with  emotional, behavioral or cognitive problems : Pt states he plays solitaire.    Social Determinants to Health: Group historical trauma; pt also has a long history of trauma in his own family system per Pau.      Strengths Identified: Access to care; supportive case management; in-home supports    Current stage of change: Precontemplation    Patient stated goals:   Uncertain.  Writer discussed goals with pt but pt did not respond.    Harm Reduction approach(es): Case management with focus on safety and basic needs stabilization    Clinical Social Work Interventions:  supportive counseling/listening; building rapport; introduction of Addiction Med clinical social work interventions    Mode of communication: Pt states he does not have his phone with him, thinks it is at home, then states he will need to buy a new  phone.      Recommendations & Plan: Pau has asked to be contacted when pt discharges and for pt's discharge orders to be faxed to her at [phone].  Writer has relayed this request to [facility] on 6B IF pt discharges tomorrow, Wednesday.    If pt discharges on Thursday or Friday, writer can update Pau and fax orders.    Writer informed pt of my colleague, Jocelyn, .  If pt continues to be hospitalized through Thursday evening, he voices agreement to a visit from Jocelyn.    Will continue to follow.        Due to regulation of Title 42 of the Code of Federal Regulations (CFR) Part 2: Confidentiality laws apply to this note and the information wherein.  Thus, this note cannot be copy and pasted into any other health care staff's note nor can it be included in general medical records sent to ANY outside agency without the patient's written consent.    CHELSIE Joaquin  She/her/hers  Social Work Services  Addiction Team  [phone] work cell phone  [phone] pager  8:00am-4:30pm M/T/Th/F; Off Wednesday's

## 2021-10-13 ENCOUNTER — APPOINTMENT (OUTPATIENT)
Dept: PHYSICAL THERAPY | Facility: CLINIC | Age: 56
End: 2021-10-13
Payer: COMMERCIAL

## 2021-10-13 ENCOUNTER — APPOINTMENT (OUTPATIENT)
Dept: OCCUPATIONAL THERAPY | Facility: CLINIC | Age: 56
End: 2021-10-13
Payer: COMMERCIAL

## 2021-10-13 LAB
ANION GAP SERPL CALCULATED.3IONS-SCNC: 6 MMOL/L (ref 3–14)
BUN SERPL-MCNC: 4 MG/DL (ref 7–30)
CALCIUM SERPL-MCNC: 8.4 MG/DL (ref 8.5–10.1)
CHLORIDE BLD-SCNC: 105 MMOL/L (ref 94–109)
CO2 SERPL-SCNC: 24 MMOL/L (ref 20–32)
CREAT SERPL-MCNC: 0.42 MG/DL (ref 0.66–1.25)
GFR SERPL CREATININE-BSD FRML MDRD: >90 ML/MIN/1.73M2
GLUCOSE BLD-MCNC: 71 MG/DL (ref 70–99)
HCV RNA SERPL NAA+PROBE-ACNC: NOT DETECTED IU/ML
HOLD SPECIMEN: NORMAL
MAGNESIUM SERPL-MCNC: 2.1 MG/DL (ref 1.6–2.3)
PHOSPHATE SERPL-MCNC: 3.7 MG/DL (ref 2.5–4.5)
POTASSIUM BLD-SCNC: 3.3 MMOL/L (ref 3.4–5.3)
POTASSIUM BLD-SCNC: 3.3 MMOL/L (ref 3.4–5.3)
POTASSIUM BLD-SCNC: 5 MMOL/L (ref 3.4–5.3)
SODIUM SERPL-SCNC: 135 MMOL/L (ref 133–144)

## 2021-10-13 PROCEDURE — 250N000009 HC RX 250: Performed by: STUDENT IN AN ORGANIZED HEALTH CARE EDUCATION/TRAINING PROGRAM

## 2021-10-13 PROCEDURE — 36415 COLL VENOUS BLD VENIPUNCTURE: CPT | Performed by: STUDENT IN AN ORGANIZED HEALTH CARE EDUCATION/TRAINING PROGRAM

## 2021-10-13 PROCEDURE — 250N000013 HC RX MED GY IP 250 OP 250 PS 637: Performed by: INTERNAL MEDICINE

## 2021-10-13 PROCEDURE — 250N000013 HC RX MED GY IP 250 OP 250 PS 637: Performed by: COUNSELOR

## 2021-10-13 PROCEDURE — 99451 NTRPROF PH1/NTRNET/EHR 5/>: CPT | Mod: GC | Performed by: DERMATOLOGY

## 2021-10-13 PROCEDURE — 258N000003 HC RX IP 258 OP 636: Performed by: INTERNAL MEDICINE

## 2021-10-13 PROCEDURE — 80048 BASIC METABOLIC PNL TOTAL CA: CPT | Performed by: STUDENT IN AN ORGANIZED HEALTH CARE EDUCATION/TRAINING PROGRAM

## 2021-10-13 PROCEDURE — 999N000128 HC STATISTIC PERIPHERAL IV START W/O US GUIDANCE

## 2021-10-13 PROCEDURE — 999N000127 HC STATISTIC PERIPHERAL IV START W US GUIDANCE

## 2021-10-13 PROCEDURE — 250N000013 HC RX MED GY IP 250 OP 250 PS 637: Performed by: STUDENT IN AN ORGANIZED HEALTH CARE EDUCATION/TRAINING PROGRAM

## 2021-10-13 PROCEDURE — 84100 ASSAY OF PHOSPHORUS: CPT | Performed by: STUDENT IN AN ORGANIZED HEALTH CARE EDUCATION/TRAINING PROGRAM

## 2021-10-13 PROCEDURE — 99233 SBSQ HOSP IP/OBS HIGH 50: CPT | Mod: GC | Performed by: STUDENT IN AN ORGANIZED HEALTH CARE EDUCATION/TRAINING PROGRAM

## 2021-10-13 PROCEDURE — 250N000013 HC RX MED GY IP 250 OP 250 PS 637: Performed by: DERMATOLOGY

## 2021-10-13 PROCEDURE — 97165 OT EVAL LOW COMPLEX 30 MIN: CPT | Mod: GO

## 2021-10-13 PROCEDURE — 258N000003 HC RX IP 258 OP 636: Performed by: STUDENT IN AN ORGANIZED HEALTH CARE EDUCATION/TRAINING PROGRAM

## 2021-10-13 PROCEDURE — 97530 THERAPEUTIC ACTIVITIES: CPT | Mod: GO

## 2021-10-13 PROCEDURE — 84132 ASSAY OF SERUM POTASSIUM: CPT | Performed by: STUDENT IN AN ORGANIZED HEALTH CARE EDUCATION/TRAINING PROGRAM

## 2021-10-13 PROCEDURE — 97116 GAIT TRAINING THERAPY: CPT | Mod: GP

## 2021-10-13 PROCEDURE — 83735 ASSAY OF MAGNESIUM: CPT | Performed by: STUDENT IN AN ORGANIZED HEALTH CARE EDUCATION/TRAINING PROGRAM

## 2021-10-13 PROCEDURE — 97129 THER IVNTJ 1ST 15 MIN: CPT | Mod: GO

## 2021-10-13 PROCEDURE — 250N000011 HC RX IP 250 OP 636: Performed by: INTERNAL MEDICINE

## 2021-10-13 PROCEDURE — 250N000013 HC RX MED GY IP 250 OP 250 PS 637

## 2021-10-13 PROCEDURE — 120N000003 HC R&B IMCU UMMC

## 2021-10-13 RX ORDER — POTASSIUM CHLORIDE 750 MG/1
20 TABLET, EXTENDED RELEASE ORAL ONCE
Status: COMPLETED | OUTPATIENT
Start: 2021-10-13 | End: 2021-10-13

## 2021-10-13 RX ORDER — LORAZEPAM 1 MG/1
1-2 TABLET ORAL EVERY 30 MIN PRN
Status: DISCONTINUED | OUTPATIENT
Start: 2021-10-13 | End: 2021-10-14 | Stop reason: HOSPADM

## 2021-10-13 RX ORDER — TRIAMCINOLONE ACETONIDE 1 MG/G
OINTMENT TOPICAL 2 TIMES DAILY
Status: DISCONTINUED | OUTPATIENT
Start: 2021-10-13 | End: 2021-10-14 | Stop reason: HOSPADM

## 2021-10-13 RX ORDER — DESONIDE 0.5 MG/G
OINTMENT TOPICAL 2 TIMES DAILY
Status: DISCONTINUED | OUTPATIENT
Start: 2021-10-13 | End: 2021-10-14 | Stop reason: HOSPADM

## 2021-10-13 RX ADMIN — THIAMINE HYDROCHLORIDE 500 MG: 100 INJECTION, SOLUTION INTRAMUSCULAR; INTRAVENOUS at 10:47

## 2021-10-13 RX ADMIN — Medication 400 MG: at 09:37

## 2021-10-13 RX ADMIN — THERA TABS 1 TABLET: TAB at 09:37

## 2021-10-13 RX ADMIN — POTASSIUM CHLORIDE 20 MEQ: 750 TABLET, EXTENDED RELEASE ORAL at 09:37

## 2021-10-13 RX ADMIN — LORAZEPAM 2 MG: 1 TABLET ORAL at 03:17

## 2021-10-13 RX ADMIN — LORAZEPAM 1 MG: 1 TABLET ORAL at 10:41

## 2021-10-13 RX ADMIN — LORAZEPAM 2 MG: 1 TABLET ORAL at 09:56

## 2021-10-13 RX ADMIN — THIAMINE HYDROCHLORIDE 500 MG: 100 INJECTION, SOLUTION INTRAMUSCULAR; INTRAVENOUS at 13:36

## 2021-10-13 RX ADMIN — DESONIDE: 0.5 OINTMENT TOPICAL at 20:50

## 2021-10-13 RX ADMIN — HYDROCORTISONE: 25 CREAM TOPICAL at 12:45

## 2021-10-13 RX ADMIN — LORAZEPAM 1 MG: 1 TABLET ORAL at 04:34

## 2021-10-13 RX ADMIN — THIAMINE HCL TAB 100 MG 100 MG: 100 TAB at 09:37

## 2021-10-13 RX ADMIN — LEVETIRACETAM 750 MG: 750 TABLET, FILM COATED ORAL at 09:37

## 2021-10-13 RX ADMIN — HYDROCORTISONE: 25 CREAM TOPICAL at 20:50

## 2021-10-13 RX ADMIN — THIAMINE HYDROCHLORIDE 500 MG: 100 INJECTION, SOLUTION INTRAMUSCULAR; INTRAVENOUS at 20:50

## 2021-10-13 RX ADMIN — ACETAMINOPHEN 650 MG: 325 TABLET, FILM COATED ORAL at 17:25

## 2021-10-13 RX ADMIN — LEVETIRACETAM 750 MG: 750 TABLET, FILM COATED ORAL at 20:50

## 2021-10-13 RX ADMIN — FOLIC ACID 1 MG: 1 TABLET ORAL at 09:37

## 2021-10-13 RX ADMIN — HYDROCORTISONE: 25 CREAM TOPICAL at 09:37

## 2021-10-13 RX ADMIN — HYDROCORTISONE: 25 CREAM TOPICAL at 16:41

## 2021-10-13 RX ADMIN — TRIAMCINOLONE ACETONIDE: 1 OINTMENT TOPICAL at 20:50

## 2021-10-13 RX ADMIN — SODIUM PHOSPHATE, MONOBASIC, MONOHYDRATE AND SODIUM PHOSPHATE, DIBASIC, ANHYDROUS 9 MMOL: 276; 142 INJECTION, SOLUTION INTRAVENOUS at 01:13

## 2021-10-13 ASSESSMENT — ACTIVITIES OF DAILY LIVING (ADL)
ADLS_ACUITY_SCORE: 8
ADLS_ACUITY_SCORE: 11
ADLS_ACUITY_SCORE: 8
ADLS_ACUITY_SCORE: 11
ADLS_ACUITY_SCORE: 8
ADLS_ACUITY_SCORE: 11
IADL_COMMENTS: IND

## 2021-10-13 ASSESSMENT — MIFFLIN-ST. JEOR: SCORE: 1420.5

## 2021-10-13 NOTE — PROVIDER NOTIFICATION
"\"[email or web address] [id number] 6B Vic 5. Can you call me to discuss reinitiating CIWA protocol and meds. Pt more restless and possibly more confused, Scoring 8 on incomplete CIWA assessment. Thanks Celso [phone]\"        Paged Vic 5 intern on call Dr. LANCE Lorenzo via smart web at [phone].      MD called back at [phone] and agreed with restarting CIWA and meds. Also given update that sitter is being initiated as soon as possible.      "

## 2021-10-13 NOTE — PLAN OF CARE
Shift: [id number]    Status: Admitted on 10/10 after witnessed fall and seizure, found new compression fracture. History of ETOH abuse (last drink 10/9 per notes), multiple previous thoracic and lumbar spine fractures, depression, withdrawal seizures    Neuro: Disoriented to place (also situation as of approximately 0530), confused, speech illogical at times, forgetful, easily redirectable, pleasant most of the time  Cardiac/VS: VSS, NSR with sinus bradycardia in high 50's at times  Respiratory: RA  GI: Denies N/V  Diet/Appetite: Regular  : Voids spontaneously   Activity: SBA to assist x1 with walker and TLSO brace when out of bed  Pain: Denies pain  Skin: No new deficits   LDA(s): PIV in R arm @ TKO with intermittent electrolyte infusions. Patient loses IV access frequently, might require midline if stay is prolonged and still requiring IV replacements      ELECTROLYTE REPLACEMENT ORDER SETS    -High potassium replacement protocol    -Recheck in AM    -High magnesium replacement protocol    -Recheck scheduled for AM    -High phosphorus replacement protocol    -gave 9 mmol phosphorus at 0110, recheck in AM       Changes this shift:   -initiated bedside attendant  -Re-initiated CIWA protocol and PO ativan:    -gave 2mg at 0315 for CIWA of 14   -gave 1mg at 0431 for CIWA of 11   -skipped dose at 0535 for CIWA of 11 (see MAR)   -skipped dose at 0631 for CIWA of 8 (see MAR)      Plan: Continue CIWA scoring

## 2021-10-13 NOTE — PROVIDER NOTIFICATION
"\"KATERINE rm 238-1 6B [facility] 5. Could you call again to discuss pt sedation status? still scoring 8-11 on CIWA, but needing to hold ativan doses per MAR. Thanks Celso [id number] \"        Paged Vic on-call intern Dr. LANCE Lorenzo via smart web at [phone].      [facility] 5 med student called back at 0715, informed of the changes, will inform team.     "

## 2021-10-13 NOTE — CONSULTS
TELEDERMATOLOGY Consult Note    IMPRESSION/PLAN:  1. Pruritic pink plaques 2/2 most likely psoriasis.  Chronic pruritic eruption. Clinically favor psoriasis. Nutritional deficiency is also on the ddx and could play a role. Will rec some topicals and f/u in derm clinic.  - check folate, B12, zinc and replete if needed  start triamcinolone 0.1% ointment BID to plaques on the body (avoid face, genitals, groin, skin folds, buttock crease, axillae.) (ordered for you)  - start desonide ointment BID to plaques on the face, genitals, groin, skin folds, buttock crease, axillae. (ordered for you)  - if the patient has hair, can consider fluocinonide solution (lidex) to scalp if there is pruritus and plaques. If he is bald, ok to use triamcinolone ointment BID.   - stop using topical steroids if the plaques disapear.   - I will send a message to derm schedulers to arrange for follow up after discharge.    Derm will sign off , but don't hesitate to page me or call me with any questions.     Jermain Carlos MD PGY-3    Staffed with Dr Carlin MD.     I  reviewed all available images and relevant chart history, and agree with the assessment and plan as documented in the resident's note.    Mathew Gillis MD  Dermatology Attending        HISTORY OF PRESENT ILLNESS:  Mario Lamar II is a 56 year old male with an itchy cutaneous eruption. It is chronic (years and years) and involves whole body including genitals. No mucous membrane involvement. The lesions are itchy but not painful. Hydrocortisone made it a little better.    PAST MEDICAL HISTORY:  Past Medical History:   Diagnosis Date     Depression      Pancreatitis      PTSD (post-traumatic stress disorder)      Seizures (H)     withdrawl     Substance abuse (H)     alcohol abuse       Inpatient Medications:  No current outpatient medications on file.         Allergies and drug reactions:   No Known Allergies    PSYCHOSOCIAL HISTORY  Social History      Socioeconomic History     Marital status:      Spouse name: Not on file     Number of children: Not on file     Years of education: Not on file     Highest education level: Not on file   Occupational History     Not on file   Tobacco Use     Smoking status: Former Smoker     Packs/day: 0.50     Years: 33.00     Pack years: 16.50     Quit date: 10/26/2017     Years since quitting: 3.9     Smokeless tobacco: Former User   Substance and Sexual Activity     Alcohol use: Yes     Comment: daily, 0.5 gal of vodka     Drug use: No     Sexual activity: Yes     Partners: Female   Other Topics Concern     Parent/sibling w/ CABG, MI or angioplasty before 65F 55M? Not Asked   Social History Narrative     Not on file     Social Determinants of Health     Financial Resource Strain:      Difficulty of Paying Living Expenses:    Food Insecurity:      Worried About Running Out of Food in the Last Year:      Ran Out of Food in the Last Year:    Transportation Needs:      Lack of Transportation (Medical):      Lack of Transportation (Non-Medical):    Physical Activity:      Days of Exercise per Week:      Minutes of Exercise per Session:    Stress:      Feeling of Stress :    Social Connections:      Frequency of Communication with Friends and Family:      Frequency of Social Gatherings with Friends and Family:      Attends [employer] Services:      Active Member of Clubs or Organizations:      Attends Club or Organization Meetings:      Marital Status:    Intimate Partner Violence:      Fear of Current or Ex-Partner:      Emotionally Abused:      Physically Abused:      Sexually Abused:        FAMILY HISTORY:  Family History   Problem Relation Age of Onset     Alcoholism Sister      Substance Abuse Sister      Substance Abuse Mother      Substance Abuse Father      Substance Abuse Maternal Grandmother      Substance Abuse Maternal Grandfather      Substance Abuse Paternal Grandmother      Substance Abuse Paternal  Grandfather      Substance Abuse Brother      Substance Abuse Brother      Substance Abuse Brother      Substance Abuse Brother      Substance Abuse Brother        REVIEW OF SYSTEMS:  See HPI    PHYSICAL EXAMINATION:  Patient Vitals for the past 4 hrs:   BP Temp Temp src Pulse Resp SpO2   10/13/21 1111 (!) 110/97 97.6  F (36.4  C) Oral 75 16 99 %      Temp (24hrs), Av.1  F (36.7  C), Min:97.6  F (36.4  C), Max:98.6  F (37  C)    Photos notable for pink scaly papules and plaques on the knee, dorsal hand, and shins. There are papules with overlying hemorrhagic crust on the anterior shin arranged in a linear suggestive of excoriations.     LABS:   Lab Results   Component Value Date    WBC 8.4 10/11/2021    WBC 6.6 2018    HGB 12.2 10/11/2021    HGB 13.8 2018    HCT 36.0 10/11/2021    HCT 42.2 2018    MCV 97 10/11/2021    MCV 91 2018     10/11/2021     2018     10/13/2021     2018    CHLORIDE 105 10/13/2021    CHLORIDE 101 2018    CR 0.42 10/13/2021    CR 0.61 2018    INR 1.33 10/11/2021    INR 1.24 2018

## 2021-10-13 NOTE — PLAN OF CARE
Neuro: A&Ox 2-3.  Pt has intermittent confusion and forgets things  easily redirectable and reproachable.  Cardiac: SR. VS'S.   Respiratory: Sating 94%> on RA.  GI/: Adequate urine output. BM X 2 loose liquid.  Diet/appetite: Tolerating regular diet. Eating well.  Activity:  Assist x 1, up to commode and bathroom with TLSO on.  Bed alarm on and active for pt safety.  Pain: At acceptable level on current regimen.   Skin: No new deficits noted.  LDA's:One PIV    Plan: Continue with POC. Notify primary team with changes.

## 2021-10-13 NOTE — PLAN OF CARE
Neuro: intermittent confusion, intermittently disoriented to time. Easily redirectable. CIWA protocols in place. 3mg of oral ativan given between 6095-7479.   Cardiac: SR. VSS.   Respiratory: Sating 100% on RA.  GI/: Adequate urine output. Uses urinal. No BM.   Diet/appetite: Tolerating regular diet. Eating well.  Activity:  SBA, up to chair. Restless during   Pain: At acceptable level on current regimen. Pt reported 3/10 pain in midback. PRN Tylenol given to good effect. Heating pad ordered.   Skin: No new deficits noted. Dermatology looked at skin, desonide and kenalog cream added for pts psoriasis.   LDA's: PIV L.     Plan: Continue with POC. Notify primary team with changes.     Teresita Herndon RN

## 2021-10-13 NOTE — PROGRESS NOTES
Resident/Fellow Attestation   I, Aquilino Frye, was present with the medical/CHRISTINE student who participated in the service and in the documentation of the note.  I have verified the history and personally performed the physical exam and medical decision making.  I agree with the assessment and plan of care as documented in the note.      Aquilino Frye MD    PGY2  Date of Service (when I saw the patient): 10/13/21    [facility]    Progress Note - [facility] Service        Date of Admission:  10/10/2021      Assessment & Plan              Mario Lamar II is a 56 year old man with a history of substance use disorder, suicidal ideation, depression who presents after seizure found to have multiple compression fractures, hepatic steatosis, A. fib with RVR (resolved) admitted for further work-up.    Changes today:  - restarted CIWA protocol overnight   - neurology: nothing new, recommends Keppra 750mg BID & outpatient follow up    - once ready for discharge can transition thiamine to  mg (for total of 5 days high dose) then 100 mg daily indefinitely   - PT/OT recommendations: home    - patient would like to see addiction medicine prior to discharge       Seizure disorder  Patient had 2 witnessed seizures in the ED, with generalized shaking movements in upper deviation of his eyes.  Patient was Keppra loaded in the ED.  Patient reports history of seizures, alcohol withdrawal may be complicating this presentation, although less likely because he is not showing any other signs of withdrawal.  He is currently alert, conversant, following commands. 30 minute EEG on 10/11 revealed no clinical events.   - Neurology consulted, appreciate recs    - Keppra 750 mg BID     - follow up Neurology Clinic   - Seizure precautions    Substance use disorder  Concern for alcohol withdrawal  H/x Wernicke encephalopathy   Last drink per patient is a week ago, alcohol level  negative.  Patient reports he has used marijuana, smokes cigarettes but quit a year ago. He is unclear about IV drug use.  On admission, acetaminophen, salicylate level negative.  On exam patient without tremors, or anxiety, and was appropriately conversing without agitation. U tox negative. Clinical picture less concerning for alcohol withdrawal induced seizure as patient initially scoring zero on CIWA. Patient reports that he was suppose to be going to chem dep treatment in the future and state that he has attended treatment 3 times before. Per report from , patient was [place] with [place] [facility]  Of note, patient has received 3 mg lorazepam for CIWA scoring overnight.    - continue CIWA protocol     - Addiction medicine consult: awaiting recs     - peer support  will meet with patient 10/14 to provide out pt resources   - consult neurology, appreciate recs   - high dose thiamine: 500 mg IV TID x2days, then 250 mg IV daily x5d  - Discussed that if patient is ready for discharge, can transition to thiamine  mg for 5 days then 100 mg PO indefinitely    electrolyte replacement    - on phos, mag and potassium protocol, replace as necessary     Atrial fibrillation with RVR, resolved  In the setting of seizure, resolved spontaneously. No history of prior echo or heart history. Concern with alcohol history there could be a component of cardiomyopathy or some unclear etiology playing into the onset of this atrial fibrillation. TTE (10/12) reassuring with EF of 55-60% and normal global function. Considered anticoagulation, but SRAVANTHI-VASc score of 0, did not indicate need for anticoagulation.    - continue to monitor clinically   - anticoagulation: not indicated at this time      Trauma  Compression fractures of T2 to L4, age-indeterminate  Moderate to severe foraminal stenosis lumbar vertebrae   Patient presented after fall at [employer], had seizure in the ED. Trauma  has completed their tertiary exam on 10/12 and no concern for acute injury.    - Trauma consulted in emergency department, appreciate recs   - Neurosurgery consulted, appreciate recommendations   - TLSO brace provided for patient       Syncope prior to fall  Patient had syncopal event witnessed at [employer] prior to admission. He does not remember this event and cannot recall if he had pre-syncopal symptoms. Differential for etiology for syncope includes atrial fibrillation with RVR, vasovagal, neurologic vs unclear etiology.  TTE (10/12) reassuring with EF of 55-60% and normal global function, less likely cardiac in nature.      Suspected psoriasis   Patient presents with diffuse scaly, patchy lesions, most concentrated on entire back, elbows and lower legs, concerning for psoriasis. Reports that he deals with this rash itching at home, but has never taken medications for it. Dermatology was consulted to ensure correct diagnosis, which they do believe is most likely psoriasis. Nutritional deficiency is also on the ddx and could be playing a role.   - dermatology consult for definitive diagnosis and management, appreciate recs    - check folate, B12, zinc and replete if needed  start triamcinolone 0.1% ointment BID to plaques on the body (avoid face, genitals, groin, skin folds, buttock crease, axillae.) (ordered for you)  - start desonide ointment BID to plaques on the face, genitals, groin, skin folds, buttock crease, axillae. (ordered for you)  - dermatology will line up outpatient appointment      Elevated liver enzymes   Direct hyperbilirubinemia   Coagulopathy of liver disease  Hepatic cirrhosis and steatosis  Likely secondary to alcohol use disorder.  He had been evaluated by hepatology and a prior admission in 2018 was not a candidate for transplant given acute alcohol abuse. Viral hepatitis panel (10/11) negative for acute infection of Hep B. HIV negative.    - hepatitis C screening pending         Hypokalemia  Patient has needed multiple doses of potassium replacement since admission.   - Potassium replacement protocol      Disposition:   - PT/OT recs: home        --Other problems--    Anion gap metabolic acidosis, resolved   Lactic acid 12 on admission, drawn right after seizure. Normalized to 1.0 on recheck. Leukocytosis reactive.  repeat VBG normalized.     Subclinical hypothyroidism  Elevated TSH, normal T4.  May be related to toxic substances.   - Obtain history determine if symptomatic to determine if needs treatment     Patchy bilateral groundglass opacities and atelectasis in lungs  Covid negative, no cough, hypoxia, white count, less concerning for infection.   -s/p one dose ceftri in the emergency department for CAP      Enlarged lymph node at gastrohepatic ligament 1.2 cm  -Follow-up in 6 to 12 months       Diet: Snacks/Supplements Adult: Ensure Clear; With Meals  Advance Diet as Tolerated: Regular Diet Adult    DVT Prophylaxis: Pneumatic Compression Devices  Ziegler Catheter: Not present  Fluids: PO  Central Lines: None  Code Status: Full Code      Disposition Plan   Expected discharge: 10/18/2021  5-7 days pending seizure disorder work up and management.      The patient's care was discussed with the Attending Physician, Chong Vasques, DO Samantha Eller  Medical Student  [address]  Securely message with the Vocera Web Console (learn more here)  Text page via [employer] Paging/Directory  Please see sign in/sign out for up to date coverage information    Clinically Significant Risk Factors Present on Admission                ______________________________________________________________________    Interval History   Overnight patient was very restless and agitated, started on CIWA protocol and received 3 mg lorazepam overnight. Subsequently very lethargic and difficult to      Data reviewed today: I reviewed all medications, new  labs and imaging results over the last 24 hours.     Physical Exam     Vital Signs: Temp: 98  F (36.7  C) Temp src: Oral BP: 100/67 Pulse: 69   Resp: 16 SpO2: 97 % O2 Device: None (Room air)    Weight: 134 lbs 11.22 oz  Constitutional: awake, alert and no apparent distress  Respiratory: No increased work of breathing, good air exchange, clear to auscultation bilaterally, no crackles or wheezing  Cardiovascular: regular rate and rhythm, normal S1 and S2 and no murmur noted  Skin: diffuse scaly, erythematous rash diffuse on back, elbows, lower legs, patient not noted to be itching as vigorously today  Musculoskeletal: spontaneously moves all 4 extremities   Neuropsychiatric: patient conversational, alert and answering questions appropriately  Data   Recent Labs   Lab 10/13/21  1739 10/13/21  0529 10/12/21  1324 10/12/21  0527 10/11/21  2011 10/11/21  0629 10/10/21  2225 10/10/21  2216   WBC  --   --   --   --   --  8.4  --  11.1*   HGB  --   --   --   --   --  12.2*  --  15.5  16.0   MCV  --   --   --   --   --  97  --  100   PLT  --   --   --   --   --  103*  --  159   INR  --   --   --   --   --  1.33*  --  1.25*   NA  --  135  --  138  --  139   < > 137  140   POTASSIUM 5.0 3.3*  3.3* 4.2 3.4   < > 3.0*   < > 2.9*  2.9*   CHLORIDE  --  105  --  109  --  108   < > 104   CO2  --  24  --  25  --  24   < > 16*   BUN  --  4*  --  4*  --  6*   < > 8   CR  --  0.42*  --  0.55*  --  0.47*   < > 0.57*   ANIONGAP  --  6  --  4  --  7   < > 17*   JOHN  --  8.4*  --  8.2*  --  8.0*   < > 8.8   GLC  --  71  --  77  --  84   < > 112*  123*   ALBUMIN  --   --   --   --   --  2.7*  --  3.7   PROTTOTAL  --   --   --   --   --  7.3  --  9.8*   BILITOTAL  --   --   --   --   --  1.9*  --  1.8*   ALKPHOS  --   --   --   --   --  250*  --  350*   ALT  --   --   --   --   --  46  --  79*   AST  --   --   --   --   --  106*  --  188*   LIPASE  --   --   --   --   --   --   --  149   TROPONIN  --   --   --   --   --   --   --  <0.015     < > = values in this interval not displayed.     No results found for this or any previous visit (from the past 24 hour(s)).

## 2021-10-14 ENCOUNTER — APPOINTMENT (OUTPATIENT)
Dept: OCCUPATIONAL THERAPY | Facility: CLINIC | Age: 56
End: 2021-10-14
Payer: COMMERCIAL

## 2021-10-14 ENCOUNTER — APPOINTMENT (OUTPATIENT)
Dept: PHYSICAL THERAPY | Facility: CLINIC | Age: 56
End: 2021-10-14
Payer: COMMERCIAL

## 2021-10-14 VITALS
OXYGEN SATURATION: 100 % | HEIGHT: 68 IN | TEMPERATURE: 97.5 F | HEART RATE: 81 BPM | DIASTOLIC BLOOD PRESSURE: 75 MMHG | WEIGHT: 134.48 LBS | SYSTOLIC BLOOD PRESSURE: 100 MMHG | BODY MASS INDEX: 20.38 KG/M2 | RESPIRATION RATE: 16 BRPM

## 2021-10-14 LAB
ANION GAP SERPL CALCULATED.3IONS-SCNC: 6 MMOL/L (ref 3–14)
BUN SERPL-MCNC: 8 MG/DL (ref 7–30)
CALCIUM SERPL-MCNC: 8.9 MG/DL (ref 8.5–10.1)
CHLORIDE BLD-SCNC: 107 MMOL/L (ref 94–109)
CO2 SERPL-SCNC: 26 MMOL/L (ref 20–32)
CREAT SERPL-MCNC: 0.52 MG/DL (ref 0.66–1.25)
ERYTHROCYTE [DISTWIDTH] IN BLOOD BY AUTOMATED COUNT: 13.6 % (ref 10–15)
FOLATE SERPL-MCNC: 9.3 NG/ML
GFR SERPL CREATININE-BSD FRML MDRD: >90 ML/MIN/1.73M2
GLUCOSE BLD-MCNC: 102 MG/DL (ref 70–99)
HCT VFR BLD AUTO: 41 % (ref 40–53)
HGB BLD-MCNC: 13.5 G/DL (ref 13.3–17.7)
MAGNESIUM SERPL-MCNC: 1.7 MG/DL (ref 1.6–2.3)
MCH RBC QN AUTO: 32.4 PG (ref 26.5–33)
MCHC RBC AUTO-ENTMCNC: 32.9 G/DL (ref 31.5–36.5)
MCV RBC AUTO: 98 FL (ref 78–100)
PHOSPHATE SERPL-MCNC: 2.9 MG/DL (ref 2.5–4.5)
PLATELET # BLD AUTO: 126 10E3/UL (ref 150–450)
POTASSIUM BLD-SCNC: 3.7 MMOL/L (ref 3.4–5.3)
RBC # BLD AUTO: 4.17 10E6/UL (ref 4.4–5.9)
SODIUM SERPL-SCNC: 139 MMOL/L (ref 133–144)
VIT B12 SERPL-MCNC: 430 PG/ML (ref 193–986)
WBC # BLD AUTO: 6.1 10E3/UL (ref 4–11)

## 2021-10-14 PROCEDURE — 97116 GAIT TRAINING THERAPY: CPT | Mod: GP

## 2021-10-14 PROCEDURE — 97530 THERAPEUTIC ACTIVITIES: CPT | Mod: GP

## 2021-10-14 PROCEDURE — 36415 COLL VENOUS BLD VENIPUNCTURE: CPT | Performed by: STUDENT IN AN ORGANIZED HEALTH CARE EDUCATION/TRAINING PROGRAM

## 2021-10-14 PROCEDURE — 84100 ASSAY OF PHOSPHORUS: CPT | Performed by: STUDENT IN AN ORGANIZED HEALTH CARE EDUCATION/TRAINING PROGRAM

## 2021-10-14 PROCEDURE — 250N000011 HC RX IP 250 OP 636: Performed by: STUDENT IN AN ORGANIZED HEALTH CARE EDUCATION/TRAINING PROGRAM

## 2021-10-14 PROCEDURE — 84630 ASSAY OF ZINC: CPT | Performed by: STUDENT IN AN ORGANIZED HEALTH CARE EDUCATION/TRAINING PROGRAM

## 2021-10-14 PROCEDURE — 258N000003 HC RX IP 258 OP 636: Performed by: INTERNAL MEDICINE

## 2021-10-14 PROCEDURE — 250N000013 HC RX MED GY IP 250 OP 250 PS 637: Performed by: STUDENT IN AN ORGANIZED HEALTH CARE EDUCATION/TRAINING PROGRAM

## 2021-10-14 PROCEDURE — 99207 PR APP CREDIT; MD BILLING SHARED VISIT: CPT | Performed by: INTERNAL MEDICINE

## 2021-10-14 PROCEDURE — 82746 ASSAY OF FOLIC ACID SERUM: CPT | Performed by: STUDENT IN AN ORGANIZED HEALTH CARE EDUCATION/TRAINING PROGRAM

## 2021-10-14 PROCEDURE — 82607 VITAMIN B-12: CPT | Performed by: STUDENT IN AN ORGANIZED HEALTH CARE EDUCATION/TRAINING PROGRAM

## 2021-10-14 PROCEDURE — 97161 PT EVAL LOW COMPLEX 20 MIN: CPT | Mod: GP

## 2021-10-14 PROCEDURE — 83735 ASSAY OF MAGNESIUM: CPT | Performed by: STUDENT IN AN ORGANIZED HEALTH CARE EDUCATION/TRAINING PROGRAM

## 2021-10-14 PROCEDURE — 250N000013 HC RX MED GY IP 250 OP 250 PS 637: Performed by: COUNSELOR

## 2021-10-14 PROCEDURE — 80048 BASIC METABOLIC PNL TOTAL CA: CPT | Performed by: STUDENT IN AN ORGANIZED HEALTH CARE EDUCATION/TRAINING PROGRAM

## 2021-10-14 PROCEDURE — 99238 HOSP IP/OBS DSCHRG MGMT 30/<: CPT | Mod: GC | Performed by: STUDENT IN AN ORGANIZED HEALTH CARE EDUCATION/TRAINING PROGRAM

## 2021-10-14 PROCEDURE — 250N000011 HC RX IP 250 OP 636: Performed by: INTERNAL MEDICINE

## 2021-10-14 PROCEDURE — 97530 THERAPEUTIC ACTIVITIES: CPT | Mod: GO

## 2021-10-14 PROCEDURE — 85027 COMPLETE CBC AUTOMATED: CPT | Performed by: STUDENT IN AN ORGANIZED HEALTH CARE EDUCATION/TRAINING PROGRAM

## 2021-10-14 RX ORDER — TRIAMCINOLONE ACETONIDE 1 MG/G
OINTMENT TOPICAL 2 TIMES DAILY
Qty: 80 G | Refills: 0 | Status: SHIPPED | OUTPATIENT
Start: 2021-10-14

## 2021-10-14 RX ORDER — LEVETIRACETAM 750 MG/1
750 TABLET ORAL 2 TIMES DAILY
Qty: 60 TABLET | Refills: 0 | Status: SHIPPED | OUTPATIENT
Start: 2021-10-14

## 2021-10-14 RX ORDER — DESONIDE 0.5 MG/G
OINTMENT TOPICAL 2 TIMES DAILY
Qty: 60 G | Refills: 0 | Status: SHIPPED | OUTPATIENT
Start: 2021-10-14

## 2021-10-14 RX ORDER — FOLIC ACID 1 MG/1
1 TABLET ORAL DAILY
Qty: 60 TABLET | Refills: 0 | Status: SHIPPED | OUTPATIENT
Start: 2021-10-15

## 2021-10-14 RX ORDER — LANOLIN ALCOHOL/MO/W.PET/CERES
CREAM (GRAM) TOPICAL
Qty: 68 TABLET | Refills: 0 | Status: SHIPPED | OUTPATIENT
Start: 2021-10-20 | End: 2021-12-19

## 2021-10-14 RX ORDER — NALTREXONE HYDROCHLORIDE 50 MG/1
50 TABLET, FILM COATED ORAL DAILY
Qty: 60 TABLET | Refills: 0 | Status: SHIPPED | OUTPATIENT
Start: 2021-10-14

## 2021-10-14 RX ORDER — MAGNESIUM SULFATE HEPTAHYDRATE 40 MG/ML
2 INJECTION, SOLUTION INTRAVENOUS ONCE
Status: COMPLETED | OUTPATIENT
Start: 2021-10-14 | End: 2021-10-14

## 2021-10-14 RX ORDER — POTASSIUM CHLORIDE 750 MG/1
10 TABLET, EXTENDED RELEASE ORAL ONCE
Status: COMPLETED | OUTPATIENT
Start: 2021-10-14 | End: 2021-10-14

## 2021-10-14 RX ORDER — LANOLIN ALCOHOL/MO/W.PET/CERES
100 CREAM (GRAM) TOPICAL DAILY
Qty: 60 TABLET | Refills: 0 | Status: CANCELLED | OUTPATIENT
Start: 2021-10-15

## 2021-10-14 RX ORDER — GABAPENTIN 300 MG/1
600 CAPSULE ORAL 3 TIMES DAILY
Status: CANCELLED | OUTPATIENT
Start: 2021-10-14

## 2021-10-14 RX ADMIN — LEVETIRACETAM 750 MG: 750 TABLET, FILM COATED ORAL at 07:40

## 2021-10-14 RX ADMIN — FOLIC ACID 1 MG: 1 TABLET ORAL at 07:40

## 2021-10-14 RX ADMIN — DESONIDE: 0.5 OINTMENT TOPICAL at 07:40

## 2021-10-14 RX ADMIN — HYDROCORTISONE: 25 CREAM TOPICAL at 07:40

## 2021-10-14 RX ADMIN — TRIAMCINOLONE ACETONIDE: 1 OINTMENT TOPICAL at 07:40

## 2021-10-14 RX ADMIN — THERA TABS 1 TABLET: TAB at 07:40

## 2021-10-14 RX ADMIN — MAGNESIUM SULFATE IN WATER 2 G: 40 INJECTION, SOLUTION INTRAVENOUS at 09:11

## 2021-10-14 RX ADMIN — THIAMINE HYDROCHLORIDE 250 MG: 100 INJECTION, SOLUTION INTRAMUSCULAR; INTRAVENOUS at 07:40

## 2021-10-14 RX ADMIN — POTASSIUM CHLORIDE 10 MEQ: 750 TABLET, EXTENDED RELEASE ORAL at 07:40

## 2021-10-14 RX ADMIN — THIAMINE HCL TAB 100 MG 100 MG: 100 TAB at 07:39

## 2021-10-14 ASSESSMENT — ACTIVITIES OF DAILY LIVING (ADL)
ADLS_ACUITY_SCORE: 9
ADLS_ACUITY_SCORE: 12
ADLS_ACUITY_SCORE: 12
ADLS_ACUITY_SCORE: 8

## 2021-10-14 ASSESSMENT — MIFFLIN-ST. JEOR: SCORE: 1419.5

## 2021-10-14 NOTE — PLAN OF CARE
Occupational Therapy Discharge Summary    Reason for therapy discharge:    Discharged to home.    Progress towards therapy goal(s). See goals on Care Plan in [facility] electronic health record for goal details.  Goals partially met.  Barriers to achieving goals:   discharge from facility.    Therapy recommendation(s):    No further therapy is recommended.  REC supervision and assist for higher level IADLs 2/2 cognition deficits, may need reminders to wear TLSO when OOB.

## 2021-10-14 NOTE — DISCHARGE SUMMARY
[facility]  Discharge Summary - Medicine & Pediatrics       Date of Admission:  10/10/2021  Date of Discharge:  10/14/2021  Discharging Provider: Dr. Chong Carroll  Discharge Service: Vic Chung    Discharge Diagnoses   Seizure disorder   Alcohol withdrawal   Substance use disorder   Atrial fibrillation with RVR  Multiple chronic compression fractures, T2-L4  Suspected psoriasis   Hepatic cirrhosis and steatosis     Follow-ups Needed After Discharge   Follow-up Appointments     Adult [facility] Follow-up and recommended labs and tests      Follow up with primary care provider, Elle Aguilar, within 1-2 weeks,   for hospital follow- up. No follow up labs or test are needed.     Appointments on [place] and/or [facility] (with [facility] or [facility]   provider or service). Call [phone] if you haven't heard regarding   these appointments within 7 days of discharge.           Unresulted Labs Ordered in the Past 30 Days of this Admission     Date and Time Order Name Status Description    10/13/2021 10:01 PM Zinc In process     10/11/2021  1:54 AM Blood Culture Peripheral Blood Preliminary     10/11/2021  1:54 AM Blood Culture Peripheral Blood Preliminary       These results will be followed up by PCP     Discharge Disposition   Discharged to home  Condition at discharge: Stable    Hospital Course   Mario Lamar II with a history of substance use disorder, seizure disorder admitted on 10/10/2021 for syncope, seizures and alcohol withdrawal.  The following problems were addressed during his hospitalization:    Seizure disorder  Patient had 2 witnessed seizures in the ED, with generalized shaking movements in upper deviation of his eyes.  Patient was Keppra loaded in the ED. Patient reports history of seizures and was previously prescribed Keppra, however he is not compliant. Endorses last alcoholic drink was ~1 week prior to admission, however alcohol withdrawal  may have been complicating this presentation. 30 minute EEG on 10/11 revealed no clinical events and patient restarted on Keppra 750 mg BID and would like him to follow up in Neurology Clinic.     Substance use disorder  Concern for alcohol withdrawal  H/x Wernicke encephalopathy   Last drink per patient is a week ago, alcohol level negative.  Patient reports he has used marijuana, smokes cigarettes but quit a year ago. He is unclear about IV drug use.  On admission, acetaminophen, salicylate level negative. U tox negative. Patient reports that he was suppose to be going to chem dep treatment in the future and state that he has attended treatment 3 times before. Per report from , patient was diagnosed with early wernicke encehalopathy previously at [facility]. Patient started on high dose thiamine while being hospitalized. Also placed on CIWA protocol, with scores requirements medications on 3rd day of hospitalization, discontinued at discharge with score of zero > 24 hours prior to admission. Addiction medicine did see the patient while inpatient to help establish sobeity goals with patient and line up outpatient treatment.   -  will be calling the patient to establish outpatient treatment   - will continue with 2 days of 500 mg PO thiamine followed by 100 mg thiamine daily indefinitely       electrolyte replacement    Patient was on phos, mag and potassium protocol while being hospitalized and required occasional replacements.       Atrial fibrillation with RVR, resolved  In the setting of seizure, resolved spontaneously. No history of prior echo or heart history. Concern with alcohol history there could be a component of cardiomyopathy or some unclear etiology playing into the onset of this atrial fibrillation. TTE (10/12) reassuring with EF of 55-60% and normal global function. Considered anticoagulation, but SRAVANTHI-VASc score of 0, did not indicate need for anticoagulation.        Trauma  Compression fractures of T2 to L4, age-indeterminate  Moderate to severe foraminal stenosis lumbar vertebrae   Patient presented after fall at [employer], had seizure in the ED. Trauma has completed their tertiary exam on 10/12 and no concern for acute injury. TLSO brace provided for patient.      Syncope prior to fall  Patient had syncopal event witnessed at [facility] prior to admission. He does not remember this event and cannot recall if he had pre-syncopal symptoms. Differential for etiology for syncope includes atrial fibrillation with RVR, vasovagal, neurologic vs unclear etiology.  TTE (10/12) reassuring with EF of 55-60% and normal globaetl function, less likely cardiac in nature.       Suspected psoriasis   Patient presented with diffuse scaly, patchy lesions, most concentrated on entire back, elbows and lower legs, concerning for psoriasis. Reports that he deals with this rash itching at home, but has never taken medications for it. Dermatology was consulted to ensure correct diagnosis, which they do believe is most likely psoriasis. Wanted to rule out rutritional deficiency in playing a role and D12, folate and zinc were all checked and were within normal limits.   - patient is to start triamcinolone 0.1% ointment BID to plaques on the body (avoid face, genitals, groin, skin folds, buttock crease, axillae.)  - start desonide ointment BID to plaques on the face, genitals, groin, skin folds, buttock crease, axillae.   - dermatology will line up outpatient appointment      Elevated liver enzymes   Direct hyperbilirubinemia   Coagulopathy of liver disease  Hepatic cirrhosis and steatosis  Likely secondary to alcohol use disorder.  He had been evaluated by hepatology and a prior admission in 2018 was not a candidate for transplant given acute alcohol abuse. Viral hepatitis panel (10/11) negative for acute infection of Hep B, HIV negative and hepatitis C screening negative.        Consultations This  Hospital Stay   NEUROLOGY GENERAL ADULT IP CONSULT  NUTRITION SERVICES ADULT IP CONSULT  PHYSICAL THERAPY ADULT IP CONSULT  OCCUPATIONAL THERAPY ADULT IP CONSULT  NEUROSURGERY ADULT IP CONSULT  ADDICTION SERVICE ADULT IP CONSULT FOR [place]  ORTHOSIS BRACE IP CONSULT  PHYSICAL THERAPY ADULT IP CONSULT  OCCUPATIONAL THERAPY ADULT IP CONSULT  VASCULAR ACCESS CARE ADULT IP CONSULT  VASCULAR ACCESS CARE ADULT IP CONSULT  VASCULAR ACCESS CARE ADULT IP CONSULT  VASCULAR ACCESS CARE ADULT IP CONSULT  DERMATOLOGY IP CONSULT    Code Status   Prior       The patient was discussed with Dr. Chong Carroll.     Aquilino Frye MD  [address] [place]  [address] [facility] [address]  Phone: [phone]  ______________________________________________________________________    Physical Exam   Vital Signs: Temp: 97.5  F (36.4  C) Temp src: Oral BP: 100/75 Pulse: 81   Resp: 16 SpO2: 100 % O2 Device: None (Room air)    Weight: 134 lbs 7.69 oz  Constitutional: awake, alert and no apparent distress  Respiratory: No increased work of breathing, good air exchange, clear to auscultation bilaterally, no crackles or wheezing  Cardiovascular: regular rate and rhythm, normal S1 and S2 and no murmur noted  Skin: diffuse scaly, erythematous rash diffuse on back, elbows, lower legs, patient not noted to be itching as vigorously today, unchanged from previous exam.  Musculoskeletal: spontaneously moves all 4 extremities   Neuropsychiatric: patient conversational, alert and answering questions appropriately      Primary Care Physician   Elle Aguilar    Discharge Orders      Reason for your hospital stay    Dear Mario Lamar II,    Your were hospitalized at [facility] with alcohol withdrawal and seizures and treated with anti-seizure medications.  Over your hospitalization your condition improved and today you are ready to be discharged.  You should continue  to improve but if you develop fever, shortness of breath, seizures, loss of consciousness, please seek medical attention.    We are suggesting the following medication changes:  - Start Keppra 750mg BID for seizures  - Start Thiamine 1 tablet 500mg daily for two days then 100mg daily thereafter for alcohol withdrawal  - Start folate 1mg daily for alcohol withdrawal    Please get the following tests done:  None    Please set up an appointment with:  - PCP in 1-2 weeks    It was a pleasure meeting with you today. Thank you for allowing me and my team the privilege of caring for you during your hospitalization. You are the reason we are here, and I truly hope we provided you with the excellent service you deserve. Please let us know if there is anything else we can do for you so that we can be sure you are leaving completely satisfied with your care experience.    Your hospital unit at the time of discharge is 6B so if you have any questions please call the hospital at [phone] and ask to talk to a nurse on 6B.     Sincerely,    Aquilino Frye MD  Internal Medicine  [facility]     Activity    Your activity upon discharge: activity as tolerated     Adult [facility] Follow-up and recommended labs and tests    Follow up with primary care provider, Elle Aguilar, within 1-2 weeks, for hospital follow- up. No follow up labs or test are needed.     Appointments on [place] and/or [facility] (with [facility] or [facility] provider or service). Call [phone] if you haven't heard regarding these appointments within 7 days of discharge.     Diet    Follow this diet upon discharge: Normal diet       Significant Results and Procedures   Most Recent 3 CBC's:  Recent Labs   Lab Test 10/14/21  0527 10/11/21  0629 10/10/21  2216   WBC 6.1 8.4 11.1*   HGB 13.5 12.2* 15.5  16.0   MCV 98 97 100   * 103* 159     Most Recent 3 BMP's:  Recent Labs   Lab Test 10/14/21  0527 10/13/21  1739 10/13/21  0529  10/12/21  1324 10/12/21  0527     --  135  --  138   POTASSIUM 3.7 5.0 3.3*  3.3*   < > 3.4   CHLORIDE 107  --  105  --  109   CO2 26  --  24  --  25   BUN 8  --  4*  --  4*   CR 0.52*  --  0.42*  --  0.55*   ANIONGAP 6  --  6  --  4   JOHN 8.9  --  8.4*  --  8.2*   *  --  71  --  77    < > = values in this interval not displayed.     Most Recent 2 LFT's:  Recent Labs   Lab Test 10/11/21  0629 10/10/21  2216   * 188*   ALT 46 79*   ALKPHOS 250* 350*   BILITOTAL 1.9* 1.8*       Discharge Medications   Discharge Medication List as of 10/14/2021  2:38 PM      START taking these medications    Details   desonide (DESOWEN) 0.05 % external ointment Apply topically 2 times dailyDisp-60 g, [id number]      folic acid (FOLVITE) 1 MG tablet Take 1 tablet (1 mg) by mouth daily, Disp-60 tablet, R-0, E-Prescribe      levETIRAcetam (KEPPRA) 750 MG tablet Take 1 tablet (750 mg) by mouth 2 times daily, Disp-60 tablet, R-0, E-Prescribe      naltrexone (DEPADE/REVIA) 50 MG tablet Take 1 tablet (50 mg) by mouth daily, Disp-60 tablet, R-0, E-Prescribe      thiamine (B-1) 100 MG tablet Take 5 tablets (500 mg) by mouth daily for 2 days, THEN 1 tablet (100 mg) daily., Disp-68 tablet, R-0, E-Prescribe      triamcinolone (KENALOG) 0.1 % external ointment Apply topically 2 times dailyDisp-80 g, [id number]           Allergies   No Known Allergies

## 2021-10-14 NOTE — PROGRESS NOTES
DISCHARGE                         No discharge date for patient encounter.  ----------------------------------------------------------------------------  Discharged to: Home  Via: taxi  Accompanied by: Family  Discharge Instructions: regular diet, activity as tolerated - wearing TSLO brace when OOB, medications, follow up appointments, when to call the MD, aftercare instructions.  Prescriptions: To be filled by [facility]; medication list reviewed & sent with pt  Follow Up Appointments: arranged; information given  Belongings: All sent with pt  IV: d/c'd  Telemetry: d/c'd  Pt exhibits understanding of above discharge instructions; all questions answered.    Discharge Paperwork: Signed, copied, and sent home with patient.

## 2021-10-14 NOTE — DISCHARGE INSTRUCTIONS
Rule 25:  Out-Patient Mental Health & Addiction Line ([place])  [phone]  *Call to schedule a Rule 25 assessment for treatment recommendations.  Assessments are virtual right now.        AA Meeting info:  [facility]  [phone] / [email or web address]

## 2021-10-14 NOTE — PLAN OF CARE
Physical Therapy Discharge Summary    Reason for therapy discharge:    All goals and outcomes met, no further needs identified.    Progress towards therapy goal(s). See goals on Care Plan in [facility] electronic health record for goal details.  Goals met    Therapy recommendation(s):    No further therapy is recommended.    Much improved mobility today, up IND in halls and improved alertness/safety awareness. Pt at baseline, OK for discharge home when medically ready.

## 2021-10-14 NOTE — PLAN OF CARE
Neuro: A&Ox4. No new neuro deficit. Follows command. Pleasant and cooperative.   Cardiac: SR. HR 70-60s. -100/70-60s.  Respiratory: Sating >95 on RA. No respiratory problem noted.  GI/: Adequate urine output. BM X1  Diet/appetite: Tolerating regular diet. Good appetite.   Activity:  Assist of one to bathroom and chair.   Pain: At acceptable level on current regimen. Denies pain.   Skin: Rashes on body.   LDA's: PIV   Plan: Monitor pt's neuro status closely. PT sitter was discontinued, increase visualization on pt.   Continue with POC. Notify primary team with changes.

## 2021-10-15 ENCOUNTER — PATIENT OUTREACH (OUTPATIENT)
Dept: CARE COORDINATION | Facility: CLINIC | Age: 56
End: 2021-10-15

## 2021-10-15 DIAGNOSIS — Z71.89 OTHER SPECIFIED COUNSELING: ICD-10-CM

## 2021-10-15 NOTE — PROGRESS NOTES
Clinic Care Coordination Contact  [facility]/Voicemail       Clinical Data: Care Coordinator Outreach    Outreach attempted x 1.  Unable to leave message on patient's voicemail with call back information, phone just beeps.    Plan:  Care Coordinator will try to reach patient again in 1-2 business days.    Marily Strong, [facility]  [phone]  [facility]

## 2021-10-16 LAB
BACTERIA BLD CULT: NO GROWTH
BACTERIA BLD CULT: NO GROWTH

## 2021-10-16 NOTE — PROGRESS NOTES
Clinic Care Coordination Contact  [facility]/Voicemail       Clinical Data: Care Coordinator Outreach  Outreach attempted x 2.  Unable to leave message.  Phone beeps, but doesn't ring.    Shannan Rojas, [facility]  [phone]  [facility]

## 2021-10-17 LAB — ZINC SERPL-MCNC: 50.8 UG/DL

## 2021-12-02 ENCOUNTER — MEDICAL CORRESPONDENCE (OUTPATIENT)
Dept: HEALTH INFORMATION MANAGEMENT | Facility: CLINIC | Age: 56
End: 2021-12-02
Payer: COMMERCIAL

## 2021-12-03 ENCOUNTER — TRANSFERRED RECORDS (OUTPATIENT)
Dept: HEALTH INFORMATION MANAGEMENT | Facility: CLINIC | Age: 56
End: 2021-12-03
Payer: COMMERCIAL

## 2021-12-03 ENCOUNTER — TRANSCRIBE ORDERS (OUTPATIENT)
Dept: OTHER | Age: 56
End: 2021-12-03

## 2021-12-03 DIAGNOSIS — B35.1 ONYCHOMYCOSIS: Primary | ICD-10-CM

## 2022-02-18 ENCOUNTER — OFFICE VISIT (OUTPATIENT)
Dept: PODIATRY | Facility: CLINIC | Age: 57
End: 2022-02-18
Payer: COMMERCIAL

## 2022-02-18 VITALS
WEIGHT: 139 LBS | DIASTOLIC BLOOD PRESSURE: 68 MMHG | HEIGHT: 68 IN | SYSTOLIC BLOOD PRESSURE: 118 MMHG | BODY MASS INDEX: 21.07 KG/M2

## 2022-02-18 DIAGNOSIS — M79.672 LEFT FOOT PAIN: Primary | ICD-10-CM

## 2022-02-18 DIAGNOSIS — M79.671 RIGHT FOOT PAIN: ICD-10-CM

## 2022-02-18 PROCEDURE — 99203 OFFICE O/P NEW LOW 30 MIN: CPT | Performed by: PODIATRIST

## 2022-02-18 NOTE — LETTER
2/18/2022         RE: Mario Lamar II  [address]  [place] [address]        Dear Colleague,    Thank you for referring your patient, Mario Lamar II, [facility]. Please see a copy of my visit note below.    Assessment:      ICD-10-CM    1. Left foot pain  M79.672 Adult Dermatology Referral   2. Right foot pain  M79.671 Adult Dermatology Referral          Plan:    Please refer to foot care nurse for this service in the future.    See AVS for list of locations/options.          Full disclosure on the potential for lack of insurance coverage for this service.    ABN signed prior to procedure completion today.        Return:  Foot care nurse    Sandhya Jones DPM                              Chief Complaint:     Patient presents with:  Foot Problems       HPI:  Mario Lamar II is a 56 year old year old male who presents for foot concern.    Past Medical & Surgical History:  Past Medical History:   Diagnosis Date     Depression      Pancreatitis      PTSD (post-traumatic stress disorder)      Seizures (H)     withdrawl     Substance abuse (H)     alcohol abuse      Past Surgical History:   Procedure Laterality Date     ORTHOPEDIC SURGERY      Right ankle orthopedic surgery after fracture.      Family History   Problem Relation Age of Onset     Alcoholism Sister      Substance Abuse Sister      Substance Abuse Mother      Substance Abuse Father      Substance Abuse Maternal Grandmother      Substance Abuse Maternal Grandfather      Substance Abuse Paternal Grandmother      Substance Abuse Paternal Grandfather      Substance Abuse Brother      Substance Abuse Brother      Substance Abuse Brother      Substance Abuse Brother      Substance Abuse Brother         Social History:  ?  History   Smoking Status     Former Smoker     Packs/day: 0.50     Years: 33.00     Quit date: 10/26/2017   Smokeless Tobacco     Former User     History    Drug Use No     Social History    Substance and Sexual Activity      Alcohol use: Yes        Comment: daily, 0.5 gal of vodka      Allergies:  ?   No Known Allergies     Medications:    Current Outpatient Medications   Medication     desonide (DESOWEN) 0.05 % external ointment     folic acid (FOLVITE) 1 MG tablet     levETIRAcetam (KEPPRA) 750 MG tablet     naltrexone (DEPADE/REVIA) 50 MG tablet     triamcinolone (KENALOG) 0.1 % external ointment     No current facility-administered medications for this visit.       Physical Exam:    Vitals:  [unfilled]  General:  WD/WN, in NAD.  A&O x3.  Dermatologic:  Skin is intact, open lesions absent.   Fungus present to nails, feet.  Vascular:  Pulses palpable bilateral.  Digital capillary refill time normal bilateral.  Skin temperature is normal bilateral.  Generalized edema- none bilateral.  Neurologic:    Protective sensation normal.  Gait and balance normal.  Musculoskeletal:  aROM digits, ankle intact.  Muscle strength intact to foot & ankle.  No gross deformity present.            Again, thank you for allowing me to participate in the care of your patient.        Sincerely,        Sandhya Jones DPM

## 2022-02-18 NOTE — PROGRESS NOTES
Assessment:      ICD-10-CM    1. Left foot pain  M79.672 Adult Dermatology Referral   2. Right foot pain  M79.671 Adult Dermatology Referral          Plan:    Please refer to foot care nurse for this service in the future.    See AVS for list of locations/options.          Full disclosure on the potential for lack of insurance coverage for this service.    ABN signed prior to procedure completion today.        Return:  Foot care nurse    Sandhya Jones DPM                              Chief Complaint:     Patient presents with:  Foot Problems       HPI:  Mario Lamar II is a 56 year old year old male who presents for foot concern.    Past Medical & Surgical History:  Past Medical History:   Diagnosis Date     Depression      Pancreatitis      PTSD (post-traumatic stress disorder)      Seizures (H)     withdrawl     Substance abuse (H)     alcohol abuse      Past Surgical History:   Procedure Laterality Date     ORTHOPEDIC SURGERY      Right ankle orthopedic surgery after fracture.      Family History   Problem Relation Age of Onset     Alcoholism Sister      Substance Abuse Sister      Substance Abuse Mother      Substance Abuse Father      Substance Abuse Maternal Grandmother      Substance Abuse Maternal Grandfather      Substance Abuse Paternal Grandmother      Substance Abuse Paternal Grandfather      Substance Abuse Brother      Substance Abuse Brother      Substance Abuse Brother      Substance Abuse Brother      Substance Abuse Brother         Social History:  ?  History   Smoking Status     Former Smoker     Packs/day: 0.50     Years: 33.00     Quit date: 10/26/2017   Smokeless Tobacco     Former User     History   Drug Use No     Social History    Substance and Sexual Activity      Alcohol use: Yes        Comment: daily, 0.5 gal of vodka      Allergies:  ?   No Known Allergies     Medications:    Current Outpatient Medications   Medication     desonide (DESOWEN) 0.05 % external ointment      folic acid (FOLVITE) 1 MG tablet     levETIRAcetam (KEPPRA) 750 MG tablet     naltrexone (DEPADE/REVIA) 50 MG tablet     triamcinolone (KENALOG) 0.1 % external ointment     No current facility-administered medications for this visit.       Physical Exam:    Vitals:  [unfilled]  General:  WD/WN, in NAD.  A&O x3.  Dermatologic:  Skin is intact, open lesions absent.   Fungus present to nails, feet.  Vascular:  Pulses palpable bilateral.  Digital capillary refill time normal bilateral.  Skin temperature is normal bilateral.  Generalized edema- none bilateral.  Neurologic:    Protective sensation normal.  Gait and balance normal.  Musculoskeletal:  aROM digits, ankle intact.  Muscle strength intact to foot & ankle.  No gross deformity present.

## 2022-02-18 NOTE — PATIENT INSTRUCTIONS
PATIENT INSTRUCTIONS - Podiatry / Foot & Ankle Surgery    ROUTINE FOOT CARE (NAIL TRIMMING / CALLUSES)    Go to [email or web address].[email or web address] ([employer]) and search for providers near you.  Otherwise, this is a list we have gathered of  recommended locations/providers in [place].    [facility]   [phone]   [facility]  [phone]  [email or web address]   [facility]  [phone]  [place] [facility]  [phone]  [email or web address]   [facility]  [address] [place] [address] [phone] Herb Davis DPM  [address] [place] [address] [phone]   [facility]  [phone] [address] [place] [address]  [facility]  Dr. Michael Gillespie  [phone]  [place], [place]   [facility]  [phone]  [place] & [place] Locations  (does not take BCBS) FYI:  *Some providers accept insurance while others are out of pocket. Please contact them for details*

## 2022-07-19 NOTE — PROGRESS NOTES
10/13/21 1600   Quick Adds   Type of Visit Initial Occupational Therapy Evaluation   Living Environment   People in home alone   Current Living Arrangements apartment   Home Accessibility no concerns   Transportation Anticipated public transportation   Living Environment Comments Pt lives alone in apt with elevator access. PTA IND with I/ADLs. Tub/shower with shower chair.    Self-Care   Usual Activity Tolerance good   Current Activity Tolerance good   Regular Exercise No   Activity/Exercise/Self-Care Comment IND with I/ADLs   Instrumental Activities of Daily Living (IADL)   IADL Comments IND   Disability/Function   Hearing Difficulty or Deaf no   Wear Glasses or Blind yes   Concentrating, Remembering or Making Decisions Difficulty no   Difficulty Communicating no   Difficulty Eating/Swallowing no   Walking or Climbing Stairs Difficulty no   Dressing/Bathing Difficulty no   Toileting issues no   Doing Errands Independently Difficulty (such as shopping) no   Fall history within last six months yes   Number of times patient has fallen within last six months 2   Change in Functional Status Since Onset of Current Illness/Injury yes   General Information   Onset of Illness/Injury or Date of Surgery 10/11/21   Referring Physician Chong Carroll, DO   Patient/Family Therapy Goal Statement (OT) n/a   Existing Precautions/Restrictions brace worn when out of bed;fall   Limitations/Impairments safety/cognitive   Left Upper Extremity (Weight-bearing Status) full weight-bearing (FWB)   Right Upper Extremity (Weight-bearing Status) full weight-bearing (FWB)   Heart Disease Risk Factors Medical history   General Observations and Info Pt is rather impulsive with transfers, unsure if this is pt's baseline. Will continue pt's cognition & mentation.    Cognitive Status Examination   Orientation Status person;place  (grossly oriented to time (knew month & year))   Sensory   Sensory Quick Adds No deficits were identified   Posture    Posture forward head position   Range of Motion Comprehensive   General Range of Motion no range of motion deficits identified   Strength Comprehensive (MMT)   Comment, General Manual Muscle Testing (MMT) Assessment proximal<distal    Bed Mobility   Comment (Bed Mobility) Supine>sit SBA    Transfers   Transfer Comments STS SBA-impulsive    Activities of Daily Living   BADL Assessment   (socks maxA)   Clinical Impression   Criteria for Skilled Therapeutic Interventions Met (OT) yes;meets criteria   OT Diagnosis Per H&P: 56 year old man with a history of substance use disorder, suicidal ideation, depression who presents after seizure found to have multiple compression fractures, hepatic steatosis, A. fib with RVR (resolved) admitted for further work-up.   OT Problem List-Impairments impacting ADL problems related to;activity tolerance impaired;balance;cognition;strength   Assessment of Occupational Performance 1-3 Performance Deficits   Identified Performance Deficits transfers, cognition, LB dressing.   Planned Therapy Interventions (OT) ADL retraining;strengthening   Intervention Comments Pt is below baseline with functional tasks & mobility    Clinical Decision Making Complexity (OT) low complexity   Therapy Frequency (OT) 5x/week   Predicted Duration of Therapy 1 week    Anticipated Equipment Needs Upon Discharge (OT)   (TBD )   Risk & Benefits of therapy have been explained evaluation/treatment results reviewed   Comment-Clinical Impression Pt is below with functional tasks & mobility. Would like to administer additional cognitive assessments for safe d/c plan.    OT Discharge Planning    OT Discharge Recommendation (DC Rec) Home with assist   OT Rationale for DC Rec Pt is below with functional tasks & mobility. Would like to administer additional cognitive assessments for safe d/c plan.    Total Evaluation Time (Minutes)   Total Evaluation Time (Minutes) 10      Additional Notes: Patient consent was obtained to proceed with the visit and recommended plan of care after discussion of all risks and benefits, including the risks of COVID-19 exposure. Detail Level: Simple

## 2022-09-22 ENCOUNTER — HOSPITAL ENCOUNTER (EMERGENCY)
Facility: CLINIC | Age: 57
Discharge: HOME | End: 2022-09-23
Attending: EMERGENCY MEDICINE | Admitting: EMERGENCY MEDICINE
Payer: COMMERCIAL

## 2022-09-22 VITALS
HEART RATE: 80 BPM | DIASTOLIC BLOOD PRESSURE: 76 MMHG | RESPIRATION RATE: 18 BRPM | SYSTOLIC BLOOD PRESSURE: 118 MMHG | TEMPERATURE: 98.6 F | OXYGEN SATURATION: 100 %

## 2022-09-22 DIAGNOSIS — E87.6 HYPOKALEMIA: ICD-10-CM

## 2022-09-22 DIAGNOSIS — E83.42 HYPOMAGNESEMIA: ICD-10-CM

## 2022-09-22 DIAGNOSIS — K70.9 ALCOHOLIC LIVER DISEASE (H): ICD-10-CM

## 2022-09-22 LAB
ALBUMIN SERPL BCG-MCNC: 3.3 G/DL (ref 3.5–5.2)
ALBUMIN UR-MCNC: 30 MG/DL
ALP SERPL-CCNC: 264 U/L (ref 40–129)
ALT SERPL W P-5'-P-CCNC: 43 U/L (ref 10–50)
AMPHETAMINES UR QL SCN: NORMAL
ANION GAP SERPL CALCULATED.3IONS-SCNC: 12 MMOL/L (ref 7–15)
APPEARANCE UR: CLEAR
AST SERPL W P-5'-P-CCNC: 107 U/L (ref 10–50)
BARBITURATES UR QL SCN: NORMAL
BASOPHILS # BLD AUTO: 0.1 10E3/UL (ref 0–0.2)
BASOPHILS NFR BLD AUTO: 1 %
BENZODIAZ UR QL SCN: NORMAL
BILIRUB SERPL-MCNC: 2.8 MG/DL
BILIRUB UR QL STRIP: ABNORMAL
BUN SERPL-MCNC: 5.8 MG/DL (ref 6–20)
BZE UR QL SCN: NORMAL
CALCIUM SERPL-MCNC: 8.5 MG/DL (ref 8.6–10)
CANNABINOIDS UR QL SCN: NORMAL
CHLORIDE SERPL-SCNC: 100 MMOL/L (ref 98–107)
COLOR UR AUTO: YELLOW
CREAT SERPL-MCNC: 0.45 MG/DL (ref 0.67–1.17)
DEPRECATED HCO3 PLAS-SCNC: 25 MMOL/L (ref 22–29)
EOSINOPHIL # BLD AUTO: 0.1 10E3/UL (ref 0–0.7)
EOSINOPHIL NFR BLD AUTO: 1 %
ERYTHROCYTE [DISTWIDTH] IN BLOOD BY AUTOMATED COUNT: 14.1 % (ref 10–15)
ETHANOL SERPL-MCNC: 0.01 G/DL
GFR SERPL CREATININE-BSD FRML MDRD: >90 ML/MIN/1.73M2
GLUCOSE SERPL-MCNC: 131 MG/DL (ref 70–99)
GLUCOSE UR STRIP-MCNC: NEGATIVE MG/DL
HCT VFR BLD AUTO: 36.9 % (ref 40–53)
HGB BLD-MCNC: 12.4 G/DL (ref 13.3–17.7)
HGB UR QL STRIP: NEGATIVE
IMM GRANULOCYTES # BLD: 0 10E3/UL
IMM GRANULOCYTES NFR BLD: 1 %
KETONES UR STRIP-MCNC: NEGATIVE MG/DL
LEUKOCYTE ESTERASE UR QL STRIP: NEGATIVE
LYMPHOCYTES # BLD AUTO: 0.9 10E3/UL (ref 0.8–5.3)
LYMPHOCYTES NFR BLD AUTO: 13 %
MAGNESIUM SERPL-MCNC: 1.6 MG/DL (ref 1.7–2.3)
MCH RBC QN AUTO: 33.3 PG (ref 26.5–33)
MCHC RBC AUTO-ENTMCNC: 33.6 G/DL (ref 31.5–36.5)
MCV RBC AUTO: 99 FL (ref 78–100)
MONOCYTES # BLD AUTO: 1.2 10E3/UL (ref 0–1.3)
MONOCYTES NFR BLD AUTO: 18 %
MUCOUS THREADS #/AREA URNS LPF: PRESENT /LPF
NEUTROPHILS # BLD AUTO: 4.4 10E3/UL (ref 1.6–8.3)
NEUTROPHILS NFR BLD AUTO: 66 %
NITRATE UR QL: NEGATIVE
NRBC # BLD AUTO: 0 10E3/UL
NRBC BLD AUTO-RTO: 0 /100
OPIATES UR QL SCN: NORMAL
PH UR STRIP: 6.5 [PH] (ref 5–7)
PLATELET # BLD AUTO: 141 10E3/UL (ref 150–450)
POTASSIUM SERPL-SCNC: 2.7 MMOL/L (ref 3.4–5.3)
POTASSIUM SERPL-SCNC: 3.1 MMOL/L (ref 3.4–5.3)
PROT SERPL-MCNC: 7.5 G/DL (ref 6.4–8.3)
RBC # BLD AUTO: 3.72 10E6/UL (ref 4.4–5.9)
RBC URINE: <1 /HPF
SODIUM SERPL-SCNC: 137 MMOL/L (ref 136–145)
SP GR UR STRIP: 1.02 (ref 1–1.03)
UROBILINOGEN UR STRIP-MCNC: 12 MG/DL
WBC # BLD AUTO: 6.6 10E3/UL (ref 4–11)
WBC URINE: 1 /HPF

## 2022-09-22 PROCEDURE — 96367 TX/PROPH/DG ADDL SEQ IV INF: CPT

## 2022-09-22 PROCEDURE — 83735 ASSAY OF MAGNESIUM: CPT | Performed by: EMERGENCY MEDICINE

## 2022-09-22 PROCEDURE — 80053 COMPREHEN METABOLIC PANEL: CPT | Performed by: EMERGENCY MEDICINE

## 2022-09-22 PROCEDURE — 250N000013 HC RX MED GY IP 250 OP 250 PS 637: Performed by: EMERGENCY MEDICINE

## 2022-09-22 PROCEDURE — 82077 ASSAY SPEC XCP UR&BREATH IA: CPT | Performed by: EMERGENCY MEDICINE

## 2022-09-22 PROCEDURE — 96361 HYDRATE IV INFUSION ADD-ON: CPT

## 2022-09-22 PROCEDURE — 99284 EMERGENCY DEPT VISIT MOD MDM: CPT | Mod: 25

## 2022-09-22 PROCEDURE — 85004 AUTOMATED DIFF WBC COUNT: CPT | Performed by: EMERGENCY MEDICINE

## 2022-09-22 PROCEDURE — 250N000011 HC RX IP 250 OP 636: Performed by: EMERGENCY MEDICINE

## 2022-09-22 PROCEDURE — 93005 ELECTROCARDIOGRAM TRACING: CPT

## 2022-09-22 PROCEDURE — 80307 DRUG TEST PRSMV CHEM ANLYZR: CPT | Performed by: EMERGENCY MEDICINE

## 2022-09-22 PROCEDURE — 258N000003 HC RX IP 258 OP 636: Performed by: EMERGENCY MEDICINE

## 2022-09-22 PROCEDURE — 84132 ASSAY OF SERUM POTASSIUM: CPT | Performed by: EMERGENCY MEDICINE

## 2022-09-22 PROCEDURE — 81001 URINALYSIS AUTO W/SCOPE: CPT | Performed by: EMERGENCY MEDICINE

## 2022-09-22 PROCEDURE — 96365 THER/PROPH/DIAG IV INF INIT: CPT

## 2022-09-22 PROCEDURE — 36415 COLL VENOUS BLD VENIPUNCTURE: CPT | Performed by: EMERGENCY MEDICINE

## 2022-09-22 RX ORDER — SODIUM CHLORIDE 9 MG/ML
INJECTION, SOLUTION INTRAVENOUS CONTINUOUS
Status: DISCONTINUED | OUTPATIENT
Start: 2022-09-22 | End: 2022-09-23 | Stop reason: HOSPADM

## 2022-09-22 RX ORDER — POTASSIUM CHLORIDE 1.5 G/1.58G
40 POWDER, FOR SOLUTION ORAL ONCE
Status: COMPLETED | OUTPATIENT
Start: 2022-09-22 | End: 2022-09-22

## 2022-09-22 RX ORDER — MAGNESIUM SULFATE HEPTAHYDRATE 40 MG/ML
2 INJECTION, SOLUTION INTRAVENOUS ONCE
Status: COMPLETED | OUTPATIENT
Start: 2022-09-22 | End: 2022-09-22

## 2022-09-22 RX ORDER — POTASSIUM CHLORIDE 7.45 MG/ML
10 INJECTION INTRAVENOUS ONCE
Status: COMPLETED | OUTPATIENT
Start: 2022-09-22 | End: 2022-09-22

## 2022-09-22 RX ADMIN — POTASSIUM CHLORIDE FOR ORAL SOLUTION 40 MEQ: 1.5 POWDER, FOR SOLUTION ORAL at 18:54

## 2022-09-22 RX ADMIN — MAGNESIUM SULFATE HEPTAHYDRATE 2 G: 40 INJECTION, SOLUTION INTRAVENOUS at 20:21

## 2022-09-22 RX ADMIN — POTASSIUM CHLORIDE 10 MEQ: 7.46 INJECTION, SOLUTION INTRAVENOUS at 18:54

## 2022-09-22 RX ADMIN — SODIUM CHLORIDE 1000 ML: 9 INJECTION, SOLUTION INTRAVENOUS at 17:48

## 2022-09-22 RX ADMIN — SODIUM CHLORIDE: 9 INJECTION, SOLUTION INTRAVENOUS at 20:22

## 2022-09-22 RX ADMIN — POTASSIUM CHLORIDE FOR ORAL SOLUTION 40 MEQ: 1.5 POWDER, FOR SOLUTION ORAL at 23:22

## 2022-09-22 ASSESSMENT — ACTIVITIES OF DAILY LIVING (ADL)
ADLS_ACUITY_SCORE: 35

## 2022-09-22 ASSESSMENT — ENCOUNTER SYMPTOMS
ABDOMINAL PAIN: 0
FEVER: 0
VOMITING: 1
NAUSEA: 1
FATIGUE: 1
COUGH: 0
HEADACHES: 0

## 2022-09-22 NOTE — ED NOTES
Bed: ED05  Expected date: 9/22/22  Expected time:   Means of arrival: Ambulance  Comments:  57 M found sleeping on bench

## 2022-09-22 NOTE — ED TRIAGE NOTES
Pt arrives via EMS after a bystander noticed pt resting next to a tree. The by standard honked the horn and pt did not move. Police were called. Pt has a bottle of mouth wash on him that he states he rinsed his mouth with and this makes him tired. Pt states that his last drink was last night. ABCs intact pt is alert to person.     Triage Assessment     Row Name 09/22/22 6269       Triage Assessment (Adult)    Airway WDL WDL       Respiratory WDL    Respiratory WDL WDL       Cardiac WDL    Cardiac WDL WDL

## 2022-09-22 NOTE — DISCHARGE INSTRUCTIONS
Your laboratory tests show that you are not dehydrated you are not getting enough electrolytes in your diet.  Please drink plenty of fluids and take a diet rich in fruits and vegetables.    Your testing today also shows that there has been damage to your liver.  This has been a chronic issue but could be getting worse.  Please follow-up with the substance abuse resources provided.    Return to the ER for any further problems.    Substance Abuse Resources    It is recommended that you abstain from all mood altering chemicals. Please contact the sober support hotline ([phone]) as needed; phones are answered 24 hours a day, 7 days a week.     To access substance abuse treatment you must have an assessment completed within 30 days of starting any program. Information for this to be completed and to secure funding if you have medical assistance or no insurance can be found through your County's chemical health intake line. If you have private insurance, call the customer service number on the back of your insurance card to find an in-network substance abuse assessment. The ideal provider will be a treatment facility, licensed in the [facility].    For those with [place] Medicaid you will need a Rule 25 assessment: The following are phone numbers for each On license of [facility]. Rule 25 assessments must be completed by your current county of residence. Once approved for funding you can connect with a facility that does Rule 25 assessment.  [facility] [phone]  [facility] [phone]  [facility] [phone]  [facility] [phone]  [facility] [phone]  [facility] [phone]  [facility] [phone]  [place] - [phone]    The following facilities also offer Rule 25/chemical health assessments:    [facility]  [phone]  Mon-Friday: [address] [facility], [address]  Saturday:  [address] [facility], [address]  M-F assessments (7a-1:45p); Saturday assessments (7:45-10:45a)    [facility]   [phone]  [address] [place], [place], [address]  *by appointment only M-W; walk ins available Fridays from 10-2.    [facility]  [phone] (phone consultation available 24/7)  In-person Assessments: [address], [place] [address]  [address] [address] [place] [address]  [address] [facility], [place] [address]  [address] [place] [address]    [facility]  [phone]  [address],  [place], [place], [address]  *walk in and appointments available by calling    [facility]  [phone]  [address] [place], [place], [address]  *by appointment only M-Th     [facility]  [phone]  [address] [place], [place], [address]  *walk ins available M-F    [facility]  [phone]  [address] [place], [place], [address]  *available by appointments only    [facility]  [phone]  [address] [place], [place], [address]  *available by appointment only    [facility] ([facility])  [phone]  [address] [place] [address] [address], [place], [address]  *Walk in assessments also available M-F starting at 8 am.    [facility]  [phone]  [address] [place], [place], [address]  *available by appointments    [facility]  [phone]  [address] [place], [place], [address]  *walk in assessments available M-F starting at 7 am.    [facility]  [phone]  [facility]  [address], [place], [address]  *Walk in assessments availble M-F starting at 8 am OR [phone]    [facility]  [address] [place] [address]  *Walk in assessments available M-F starting at 8 am    [facility]  [phone]  [address] [place] [address]    [facility]  [phone]  [address] [place], [place], [address]  *available by appointment  only    [facility]  [phone]  [address] [facility], [place], [place], [address]    [facility]  [phone]  [address] [place], [address]  *available by appointment    [facility]  [phone]  [address], [place], [address]  *walk ins available M-TH from 9-3    [facility]  [phone]  [address] [place], [address]    [facility]  [phone] [address] [place] [address]  [address] [address] [address]    [facility] (Does Rule 25 Assessments)  [email or web address]/  [phone]  [address], [place] [address]    [facility]  [email or web address]/drug-[email or web address]-treatment  [phone], [address], [place] [address]  [phone], [address], [place], [place] [address] [phone], [address], [place], [place] [address] [phone], [address], ([facility], [place] [address] [phone], [address] [address], [place] [address]    If you are intoxicated, you may be required to detox at a detox facility before starting treatment. The following are detox facilities that you can self present to. All detox facilities are able to help you complete an assessment/rule 25 prior to discharge if you choose:    [facility]: [address] [place], [place], [address] [phone]  [facility]: [address] [place], [place], [address]  [phone]  [facility]: [address] [place], [place], [address]  [phone]  [facility]: [address], [place], [place], [address] [phone]  [facility]: [address], [place], [address] [phone]     Ways to help cope with sobriety:  Take prescribed medicines as scheduled  Keep follow-up appointments  Talk to  others about your concerns  Get regular exercise  Practice deep breathing skills  Eat a healthy diet  Use community resources, including hotline numbers, [facility] crisis and support meetings  Stay sober and avoid places/people/things associated with substance use  Maintain a daily schedule/routine  Get at least 7-8 hours of sleep per night  Create a list 10--20 healthy activities that you can do that are enjoyable and do not involve substance use  Create daily goals (approx. 1-4 goals) per day and work to achieve them throughout the day    [facility] [place] [facility] ([facility]): [facility] connects people seeking recovery to resources that help foster and sustain long-term recovery. Whether you are seeking resources for treatment, transportation, housing, job training, education, health care or other pathways to recovery, [facility] is a great place to start. Phone: [phone]. www.[place] (Great listing of all types of recovery and non-recovery related resources)    [facility]: https://www.[employer].org/    Alcoholics Anonymous: 0-800-ALCOHOL  HTTP://WWW.AA.ORG/  [facility] [place] (890-763-8261 or http://aaminneaWatsi.org)  [facility] [place] (540-606-9454 or www.aastpaul.org)    Narcotics Anonymous: 666.641.6311  www.[employer].us.    [facility]: [address] [place] [address], [address], [place]  [phone]  Drop-in Hours: Monday-Friday 9-11:30 am. By appointment at other times.  [facility] is a drop-in center on the east side [facility] that provides a safe space for individuals who are homeless and have a history of chemical use. Sobriety is not a requirement but drugs and alcohol are not allowed on the property.  Non-clients can access drop-in services such as Recovery and Harm Reduction Groups, referrals to case management, community activities, shower facilities, and a pool table. Individuals who are homeless and have chemical health needs may be eligible for enrollment into [facility]'s case management program. Clients and  work together to access benefits, treatment, health care, shelter, and external housing resources.     [facility] Chemical Assessment & Referral Unit: [address] [place] [address]  [phone]  Monday-Friday 8 am-5 pm  Rule 25 assessment and referral for individuals seeking treatment or counseling for chemical dependency. Must be a resident of [facility]. There is no fee for assessment. There is some funding available for treatment programs.    [facility]: [address] [facility]  [phone] or [phone]  Monday - Friday 7 am - 5 pm  Daily drop-in Rule 25 assessments and weekly mental health assessments and services. Outpatient chemical dependency treatment (with sober recovery housing), relapse prevention, case management, and employment services. Outpatient and residential treatment for women with children. Specializes in assisting individuals with a history of relapse who face multiple barriers to achieving stable recovery. No charge for most services or services can be billed through insurance. Gender-specific services and treatment for co-occurring substance abuse and mental health concerns offered.

## 2022-09-22 NOTE — ED PROVIDER NOTES
History     Chief Complaint:  Alcohol Intoxication      HPI   Mario Lamar II is a 57 year old male who has a history of atrial fibrillation, alcohol use disorder, substance abuse, liver cirrhosis, and presents with alcohol intoxication. The patient was resting by a tree and was noticed by a bystander earlier today. They honked his horn to get his attention but he did not move, so they called PD. He had a bottle of mouthwash that he used earlier to rinse his mouth, which makes him more fatigued. He drank alcohol and smoke tobacco last night. He feels nauseous, and vomited last night as well. He denies headache, chest pain, abdominal pain, fever, cough, or head injuries. He denies drug use.     Review of Systems   Constitutional: Positive for fatigue. Negative for fever.   Respiratory: Negative for cough.    Cardiovascular: Negative for chest pain.   Gastrointestinal: Positive for nausea and vomiting. Negative for abdominal pain.   Neurological: Negative for headaches.   All other systems reviewed and are negative.    Allergies:  No Known Allergies      Medications:    desonide  folic acid  levetiracetam  naltrexone   triamcinolone     Past Medical History:    Depression with suicidal ideation  Pancreatitis  PTSD  Seizures  Substance abuse  Atrial fibrillation  Liver cirrhosis  compression fracture of T8 vertebra  Thrombocytopenia  Alcohol use disorder  Cognitive and neurobehavioral dysfunction following brain injury    Past Surgical History:    Right ankle orthopedic surgery after fracture    Family History:    Sister: alcoholism, substance abuse  Mother: substance abuse  Father: substance abuse  Maternal grandmother: substance abuse  Maternal grandfather: substance abuse  Paternal grandmother: substance abuse  Paternal grandfather: substance abuse  Brother: substance abuse    Social History:  Presents alone via EMS    Physical Exam     Patient Vitals for the past 24 hrs:   BP Temp Temp src Pulse Resp  SpO2   09/22/22 1730 118/76 98.6  F (37  C) Oral 80 18 100 %       Physical Exam  Constitutional:       General: He is not in acute distress.     Appearance: He is not diaphoretic.   HENT:      Head: Atraumatic.   Eyes:      General: No scleral icterus.     Pupils: Pupils are equal, round, and reactive to light.   Cardiovascular:      Rate and Rhythm: Normal rate and regular rhythm.      Heart sounds: Normal heart sounds.   Pulmonary:      Effort: No respiratory distress.      Breath sounds: Normal breath sounds.   Abdominal:      General: Bowel sounds are normal.      Palpations: Abdomen is soft.      Tenderness: There is no abdominal tenderness.   Musculoskeletal:         General: No tenderness.   Skin:     General: Skin is warm.      Findings: No rash.   Neurological:      General: No focal deficit present.      Mental Status: He is oriented to person, place, and time.   Psychiatric:         Mood and Affect: Mood normal.         Behavior: Behavior normal.           Emergency Department Course   ECG:  ECG taken at 1746, ECG read at 1752  Normal sinus rhythm  Moderate voltage criteria for LVH, may be normal variant  Possible lateral infarct, age undetermined  Abnormal ECG   Rate 75 bpm. SD interval * ms. QRS duration 80 ms. QT/QTc 440/491 ms. P-R-T axes 13 -28 -9.     Laboratory:  Labs Ordered and Resulted from Time of ED Arrival to Time of ED Departure   COMPREHENSIVE METABOLIC PANEL - Abnormal       Result Value    Sodium 137      Potassium 2.7 (*)     Chloride 100      Carbon Dioxide (CO2) 25      Anion Gap 12      Urea Nitrogen 5.8 (*)     Creatinine 0.45 (*)     Calcium 8.5 (*)     Glucose 131 (*)     Alkaline Phosphatase 264 (*)      (*)     ALT 43      Protein Total 7.5      Albumin 3.3 (*)     Bilirubin Total 2.8 (*)     GFR Estimate >90     MAGNESIUM - Abnormal    Magnesium 1.6 (*)    ETHYL ALCOHOL LEVEL - Abnormal    Alcohol ethyl 0.01 (*)    ROUTINE UA WITH MICROSCOPIC REFLEX TO CULTURE - Abnormal     Color Urine Yellow      Appearance Urine Clear      Glucose Urine Negative      Bilirubin Urine Small (*)     Ketones Urine Negative      Specific Gravity Urine 1.022      Blood Urine Negative      pH Urine 6.5      Protein Albumin Urine 30  (*)     Urobilinogen Urine 12.0 (*)     Nitrite Urine Negative      Leukocyte Esterase Urine Negative      Mucus Urine Present (*)     RBC Urine <1      WBC Urine 1     CBC WITH PLATELETS AND DIFFERENTIAL - Abnormal    WBC Count 6.6      RBC Count 3.72 (*)     Hemoglobin 12.4 (*)     Hematocrit 36.9 (*)     MCV 99      MCH 33.3 (*)     MCHC 33.6      RDW 14.1      Platelet Count 141 (*)     % Neutrophils 66      % Lymphocytes 13      % Monocytes 18      % Eosinophils 1      % Basophils 1      % Immature Granulocytes 1      NRBCs per 100 WBC 0      Absolute Neutrophils 4.4      Absolute Lymphocytes 0.9      Absolute Monocytes 1.2      Absolute Eosinophils 0.1      Absolute Basophils 0.1      Absolute Immature Granulocytes 0.0      Absolute NRBCs 0.0     POTASSIUM - Abnormal    Potassium 3.1 (*)    DRUG ABUSE SCREEN 1 URINE (ED) - Normal    Amphetamines Urine Screen Negative      Barbituates Urine Screen Negative      Benzodiazepine Urine Screen Negative      Cannabinoids Urine Screen Negative      Cocaine Urine Screen Negative      Opiates Urine Screen Negative         Emergency Department Course:    Reviewed:  I reviewed nursing notes, vitals, past medical history and Care Everywhere    Assessments:  1722 I obtained history and examined the patient as noted above.   2106 I rechecked the patient.    Interventions:  1748 NS 1000 mL IV  1854 Potassium 40 mEq PO  1854 Potassium chloride 10 mEq IV  2021 Magnesium 2 g IV    Disposition:  The patient was discharged to home.     Impression & Plan      Medical Decision Making:  This patient is a 57-year-old man who presents to the ED after he was found sleeping in public and was felt to be difficult to arouse.  On arrival here with  EMS the patient is easily arousable and is conversant.  He denies alcohol use today but he says he was drinking yesterday.  He had a bottle of mouthwash next to him although it is completely full.  He says he was not drinking it.  He says he only uses sips to refresh his breath.  I asked him again prior to discharge and he denies any other toxic alcohol use such as mouthwash, rubbing alcohol, or antifreeze.    The patient does have electrolyte disturbances with hypokalemia and hypomagnesemia.  These are replaced here.  The patient was hydrated with IV fluids.  He is able to eat a meal without any difficulty.    We discussed his alcoholic liver disease.  This appears to be a chronic issue.  He was given substance abuse referrals.    Diagnosis:    ICD-10-CM    1. Hypomagnesemia  E83.42    2. Hypokalemia  E87.6    3. Alcoholic liver disease (H)  K70.9        Discharge Medications:  New Prescriptions    No medications on file       Scribe Disclosure:  Enrique SELF Hired, am serving as a scribe at 5:30 PM on 9/22/2022 to document services personally performed by Lee Novoa MD based on my observations and the provider's statements to me.      Lee Novoa MD  09/22/22 3136

## 2022-09-23 ENCOUNTER — HOSPITAL ENCOUNTER (EMERGENCY)
Facility: CLINIC | Age: 57
Discharge: HOME | End: 2022-09-24
Attending: EMERGENCY MEDICINE | Admitting: EMERGENCY MEDICINE
Payer: COMMERCIAL

## 2022-09-23 DIAGNOSIS — E87.6 HYPOKALEMIA: ICD-10-CM

## 2022-09-23 DIAGNOSIS — F10.920 ALCOHOLIC INTOXICATION WITHOUT COMPLICATION (H): ICD-10-CM

## 2022-09-23 LAB
ALBUMIN SERPL BCG-MCNC: 3.3 G/DL (ref 3.5–5.2)
ALP SERPL-CCNC: 259 U/L (ref 40–129)
ALT SERPL W P-5'-P-CCNC: 39 U/L (ref 10–50)
ANION GAP SERPL CALCULATED.3IONS-SCNC: 13 MMOL/L (ref 7–15)
ANION GAP SERPL CALCULATED.3IONS-SCNC: 17 MMOL/L (ref 7–15)
AST SERPL W P-5'-P-CCNC: 107 U/L (ref 10–50)
ATRIAL RATE - MUSE: 357 BPM
BASOPHILS # BLD AUTO: 0.1 10E3/UL (ref 0–0.2)
BASOPHILS NFR BLD AUTO: 1 %
BILIRUB SERPL-MCNC: 2.9 MG/DL
BUN SERPL-MCNC: 3.2 MG/DL (ref 6–20)
BUN SERPL-MCNC: 3.8 MG/DL (ref 6–20)
CALCIUM SERPL-MCNC: 7.5 MG/DL (ref 8.6–10)
CALCIUM SERPL-MCNC: 8 MG/DL (ref 8.6–10)
CHLORIDE SERPL-SCNC: 106 MMOL/L (ref 98–107)
CHLORIDE SERPL-SCNC: 99 MMOL/L (ref 98–107)
CREAT SERPL-MCNC: 0.31 MG/DL (ref 0.67–1.17)
CREAT SERPL-MCNC: 0.36 MG/DL (ref 0.67–1.17)
DEPRECATED HCO3 PLAS-SCNC: 18 MMOL/L (ref 22–29)
DEPRECATED HCO3 PLAS-SCNC: 18 MMOL/L (ref 22–29)
DIASTOLIC BLOOD PRESSURE - MUSE: NORMAL MMHG
EOSINOPHIL # BLD AUTO: 0.1 10E3/UL (ref 0–0.7)
EOSINOPHIL NFR BLD AUTO: 1 %
ERYTHROCYTE [DISTWIDTH] IN BLOOD BY AUTOMATED COUNT: 14.7 % (ref 10–15)
ETHANOL SERPL-MCNC: 0.15 G/DL
GFR SERPL CREATININE-BSD FRML MDRD: >90 ML/MIN/1.73M2
GFR SERPL CREATININE-BSD FRML MDRD: >90 ML/MIN/1.73M2
GLUCOSE SERPL-MCNC: 64 MG/DL (ref 70–99)
GLUCOSE SERPL-MCNC: 76 MG/DL (ref 70–99)
HCT VFR BLD AUTO: 37.1 % (ref 40–53)
HGB BLD-MCNC: 12 G/DL (ref 13.3–17.7)
HOLD SPECIMEN: NORMAL
IMM GRANULOCYTES # BLD: 0.1 10E3/UL
IMM GRANULOCYTES NFR BLD: 1 %
INTERPRETATION ECG - MUSE: NORMAL
LYMPHOCYTES # BLD AUTO: 1.2 10E3/UL (ref 0.8–5.3)
LYMPHOCYTES NFR BLD AUTO: 13 %
MAGNESIUM SERPL-MCNC: 1.8 MG/DL (ref 1.7–2.3)
MCH RBC QN AUTO: 32.9 PG (ref 26.5–33)
MCHC RBC AUTO-ENTMCNC: 32.3 G/DL (ref 31.5–36.5)
MCV RBC AUTO: 102 FL (ref 78–100)
MONOCYTES # BLD AUTO: 1.3 10E3/UL (ref 0–1.3)
MONOCYTES NFR BLD AUTO: 14 %
NEUTROPHILS # BLD AUTO: 6.5 10E3/UL (ref 1.6–8.3)
NEUTROPHILS NFR BLD AUTO: 70 %
NRBC # BLD AUTO: 0 10E3/UL
NRBC BLD AUTO-RTO: 0 /100
P AXIS - MUSE: 13 DEGREES
PLATELET # BLD AUTO: 149 10E3/UL (ref 150–450)
POTASSIUM SERPL-SCNC: 3.3 MMOL/L (ref 3.4–5.3)
POTASSIUM SERPL-SCNC: 3.6 MMOL/L (ref 3.4–5.3)
PR INTERVAL - MUSE: NORMAL MS
PROT SERPL-MCNC: 7.4 G/DL (ref 6.4–8.3)
QRS DURATION - MUSE: 80 MS
QT - MUSE: 440 MS
QTC - MUSE: 491 MS
R AXIS - MUSE: -28 DEGREES
RBC # BLD AUTO: 3.65 10E6/UL (ref 4.4–5.9)
SODIUM SERPL-SCNC: 134 MMOL/L (ref 136–145)
SODIUM SERPL-SCNC: 137 MMOL/L (ref 136–145)
SYSTOLIC BLOOD PRESSURE - MUSE: NORMAL MMHG
T AXIS - MUSE: -9 DEGREES
VENTRICULAR RATE- MUSE: 75 BPM
WBC # BLD AUTO: 9.2 10E3/UL (ref 4–11)

## 2022-09-23 PROCEDURE — 99284 EMERGENCY DEPT VISIT MOD MDM: CPT | Mod: 25

## 2022-09-23 PROCEDURE — 36415 COLL VENOUS BLD VENIPUNCTURE: CPT | Performed by: EMERGENCY MEDICINE

## 2022-09-23 PROCEDURE — 93005 ELECTROCARDIOGRAM TRACING: CPT

## 2022-09-23 PROCEDURE — 82077 ASSAY SPEC XCP UR&BREATH IA: CPT | Performed by: EMERGENCY MEDICINE

## 2022-09-23 PROCEDURE — 250N000011 HC RX IP 250 OP 636: Performed by: EMERGENCY MEDICINE

## 2022-09-23 PROCEDURE — 85025 COMPLETE CBC W/AUTO DIFF WBC: CPT | Performed by: EMERGENCY MEDICINE

## 2022-09-23 PROCEDURE — 80177 DRUG SCRN QUAN LEVETIRACETAM: CPT | Performed by: EMERGENCY MEDICINE

## 2022-09-23 PROCEDURE — 82310 ASSAY OF CALCIUM: CPT | Performed by: EMERGENCY MEDICINE

## 2022-09-23 PROCEDURE — 96365 THER/PROPH/DIAG IV INF INIT: CPT

## 2022-09-23 PROCEDURE — 83735 ASSAY OF MAGNESIUM: CPT | Performed by: EMERGENCY MEDICINE

## 2022-09-23 PROCEDURE — 258N000003 HC RX IP 258 OP 636: Performed by: EMERGENCY MEDICINE

## 2022-09-23 PROCEDURE — 96367 TX/PROPH/DG ADDL SEQ IV INF: CPT

## 2022-09-23 PROCEDURE — 250N000009 HC RX 250: Performed by: EMERGENCY MEDICINE

## 2022-09-23 PROCEDURE — 250N000013 HC RX MED GY IP 250 OP 250 PS 637: Performed by: EMERGENCY MEDICINE

## 2022-09-23 PROCEDURE — 80053 COMPREHEN METABOLIC PANEL: CPT | Performed by: EMERGENCY MEDICINE

## 2022-09-23 PROCEDURE — 96366 THER/PROPH/DIAG IV INF ADDON: CPT

## 2022-09-23 RX ORDER — LEVETIRACETAM 10 MG/ML
1000 INJECTION INTRAVASCULAR ONCE
Status: COMPLETED | OUTPATIENT
Start: 2022-09-23 | End: 2022-09-23

## 2022-09-23 RX ORDER — POTASSIUM CHLORIDE 1.5 G/1.58G
20 POWDER, FOR SOLUTION ORAL ONCE
Status: COMPLETED | OUTPATIENT
Start: 2022-09-23 | End: 2022-09-23

## 2022-09-23 RX ADMIN — POTASSIUM CHLORIDE: 2 INJECTION, SOLUTION, CONCENTRATE INTRAVENOUS at 17:47

## 2022-09-23 RX ADMIN — LEVETIRACETAM 1000 MG: 10 INJECTION INTRAVENOUS at 18:45

## 2022-09-23 RX ADMIN — POTASSIUM CHLORIDE FOR ORAL SOLUTION 20 MEQ: 1.5 POWDER, FOR SOLUTION ORAL at 18:54

## 2022-09-23 ASSESSMENT — ACTIVITIES OF DAILY LIVING (ADL)
ADLS_ACUITY_SCORE: 35

## 2022-09-23 ASSESSMENT — ENCOUNTER SYMPTOMS: CONFUSION: 1

## 2022-09-23 NOTE — ED TRIAGE NOTES
"Pt states the \"police or someone\" found him in the [place] \"passed out\". States he was afraid he might get in a fight \"or something.\"    Pt is alert, pleasant and very chatty at room arrival. Skin is warm and dry. Respirations are even and easy.       "

## 2022-09-23 NOTE — ED PROVIDER NOTES
History   Chief Complaint:  Intoxication    The history is provided by the patient.     Mario Lamar II is a 57 year old male with history of alcohol withdrawal, atrial fibrillation, substance abuse, liver cirrhosis, and seizure who presents with intoxication. The patient reports that he was sleeping trying to fight anyone when he was found. Police suspected that the patient had been drinking mouthwash. The patient endorses this and states that is what put him to sleep. Reports that he is not sure what kind of mouthwash he was drinking but thinks it may have been Listerine. Notes that he started drinking mouthwash after he was discharged from here last night. Of note, the patient states he has not been taking his medication for seizures and reports that he does not have them. Denies drinking rubbing alcohol, antifreeze, or other alcohols. Denies use of marijuana or other drugs. Denies other trauma or injuries.     Review of Systems   Psychiatric/Behavioral: Positive for confusion.   All other systems reviewed and are negative.    Allergies:  No Known Allergies    Medications:  Desonide  Levetriacetam  Naltrexone  Triamcinolone  Folic acid     Past Medical History:     Depression with suicidal ideation  Alcohol withdrawal  Acute pancreatitis  Seizure  Atrial fibrillation  TBI  Subarachnoid hemorrhage  [place]  Substance use disorder  ADD  Alcoholic hepatitis  L5 vertebral fracture  Ascites  GERD  Hypokalemia  Vitamin D deficiency  Encephalopathy  Sepsis   Vision loss, right  Cirrhosis of liver  Iron deficiency anemia    Past Surgical History:    Ankle fracture, right  Laparoscopic cholecystectomy     Family History:    Father- alcohol/drug, asthma, diabetes, hypertension, substance abuse  Mother- arthritis, asthma, diabetes, hypertension, substance abuse  Sister- alcoholism, substance abuse  Brother- alcohol/drug, psychiatry    Social History:  Presents alone  Presents via EMS    Physical Exam     Patient  Vitals for the past 24 hrs:   BP Temp Temp src Pulse Resp SpO2 Weight   09/23/22 2100 -- -- -- 62 9 -- --   09/23/22 2030 -- -- -- 54 13 -- --   09/23/22 2000 -- -- -- 55 16 -- --   09/23/22 1845 -- -- -- 61 15 -- --   09/23/22 1830 -- -- -- 59 8 -- --   09/23/22 1815 -- -- -- 62 10 -- --   09/23/22 1800 -- -- -- 60 12 -- --   09/23/22 1745 -- -- -- 64 10 100 % --   09/23/22 1730 -- -- -- 53 10 98 % --   09/23/22 1715 -- -- -- 57 12 98 % --   09/23/22 1700 -- -- -- 60 28 100 % --   09/23/22 1645 -- -- -- -- -- 100 % --   09/23/22 1638 112/70 98  F (36.7  C) Temporal 58 16 100 % 63.5 kg (140 lb)     Physical Exam  Constitutional:       General: He is not in acute distress.     Appearance: He is not diaphoretic.      Comments: Disheveled and intoxicated   HENT:      Head: Atraumatic.   Eyes:      General: No scleral icterus.     Pupils: Pupils are equal, round, and reactive to light.   Cardiovascular:      Rate and Rhythm: Normal rate and regular rhythm.      Heart sounds: Normal heart sounds.   Pulmonary:      Effort: No respiratory distress.      Breath sounds: Normal breath sounds.   Abdominal:      General: Bowel sounds are normal.      Palpations: Abdomen is soft.      Tenderness: There is no abdominal tenderness.   Musculoskeletal:         General: No tenderness.   Skin:     General: Skin is warm.      Capillary Refill: Capillary refill takes less than 2 seconds.      Findings: No rash.   Neurological:      General: No focal deficit present.      Mental Status: He is oriented to person, place, and time.      Comments: Slurred speech   Psychiatric:      Comments: Cooperative           Emergency Department Course   ECG  ECG taken at 1700, ECG read at 1702  Sinus bradycardia  Moderate voltage criteria for LVH, may be normal variant  Prolonged QT   No chagne as compared to prior, dated 9/22/22.  Rate 58 bpm. AK interval 158 ms. QRS duration 82 ms. QT/QTc 522/512 ms. P-R-T axes 66 -20 -8.     Laboratory:  Labs  Ordered and Resulted from Time of ED Arrival to Time of ED Departure   COMPREHENSIVE METABOLIC PANEL - Abnormal       Result Value    Sodium 134 (*)     Potassium 3.3 (*)     Chloride 99      Carbon Dioxide (CO2) 18 (*)     Anion Gap 17 (*)     Urea Nitrogen 3.8 (*)     Creatinine 0.36 (*)     Calcium 8.0 (*)     Glucose 76      Alkaline Phosphatase 259 (*)      (*)     ALT 39      Protein Total 7.4      Albumin 3.3 (*)     Bilirubin Total 2.9 (*)     GFR Estimate >90     ETHYL ALCOHOL LEVEL - Abnormal    Alcohol ethyl 0.15 (*)    CBC WITH PLATELETS AND DIFFERENTIAL - Abnormal    WBC Count 9.2      RBC Count 3.65 (*)     Hemoglobin 12.0 (*)     Hematocrit 37.1 (*)      (*)     MCH 32.9      MCHC 32.3      RDW 14.7      Platelet Count 149 (*)     % Neutrophils 70      % Lymphocytes 13      % Monocytes 14      % Eosinophils 1      % Basophils 1      % Immature Granulocytes 1      NRBCs per 100 WBC 0      Absolute Neutrophils 6.5      Absolute Lymphocytes 1.2      Absolute Monocytes 1.3      Absolute Eosinophils 0.1      Absolute Basophils 0.1      Absolute Immature Granulocytes 0.1      Absolute NRBCs 0.0     BASIC METABOLIC PANEL - Abnormal    Sodium 137      Potassium 3.6      Chloride 106      Carbon Dioxide (CO2) 18 (*)     Anion Gap 13      Urea Nitrogen 3.2 (*)     Creatinine 0.31 (*)     Calcium 7.5 (*)     Glucose 64 (*)     GFR Estimate >90     MAGNESIUM - Normal    Magnesium 1.8     KEPPRA (LEVETIRACETAM) LEVEL      Emergency Department Course:  Reviewed:  I reviewed nursing notes, vitals, past medical history and Care Everywhere    Assessments:  1624 I obtained history and examined the patient as noted above.   2011 I rechecked and updated the patient.   2114 I rechecked and updated the patient.     Interventions:  1747 0.9% sodium chloride with Infuvite Adult 10 mL infusion, 1000 mL, IV  1844 Rate change, 500 mL/hr, IV  1845 Levetriacetam infusion, 1000 mg, IV  1854 Potassium chloride, 20  mEq, PO    Disposition:  The patient was discharged to home.     Impression & Plan     Medical Decision Making:  This patient is a 57-year-old man who presents to the ED with intoxication after he was found confused in public.  He was actually here last night at which point he did not have a measurable alcohol level.  His electrolytes were repleted and ultimately he was discharged under his own care.    The patient returns tonight.  He says that earlier in the day he drank mouthwash.  He was cautioned about the dangers of this practice.  He adamantly denies any other type of toxic alcohol such as rubbing alcohol, washer fluid, or otherwise.  He denies any other substance abuse.    The patient has been observed for over 6 hours.  At this point he is clinically sober.  He ate a meal very heartily.  He was able to ambulate around the ED.  He is appropriate for discharge and was on care at this point.  He has been given addiction medicine services.  He was given a dose of Keppra as he did not use any in the last 24 hours.  He was strongly encouraged to use his home medications.    Diagnosis:    ICD-10-CM    1. Alcoholic intoxication without complication (H)  F10.920    2. Hypokalemia  E87.6        Discharge Medications:  New Prescriptions    No medications on file     Scribe Disclosure:  I, Lety Olvera, am serving as a scribe at 4:19 PM on 9/23/2022 to document services personally performed by Lee Novoa MD based on my observations and the provider's statements to me.     Lee Novoa MD  09/23/22 1520

## 2022-09-24 VITALS
DIASTOLIC BLOOD PRESSURE: 66 MMHG | BODY MASS INDEX: 21.29 KG/M2 | WEIGHT: 140 LBS | SYSTOLIC BLOOD PRESSURE: 94 MMHG | OXYGEN SATURATION: 100 % | RESPIRATION RATE: 18 BRPM | HEART RATE: 80 BPM | TEMPERATURE: 98 F

## 2022-09-24 LAB — LEVETIRACETAM SERPL-MCNC: <2 ΜG/ML (ref 10–40)

## 2022-09-24 ASSESSMENT — ACTIVITIES OF DAILY LIVING (ADL): ADLS_ACUITY_SCORE: 35

## 2022-09-24 NOTE — ED NOTES
"Patient up and ambulated in the department. In need of stand by assist to keep patient steady on his feet.     Incontinent of a large amount of urine. Brought patient to bathroom and had him sit and attempt to void on the toilet while changing his linen. Patient given scrub bottoms and soaked clothing placed in a patient belonging bag.     Patient tolerated boxed lunch eating 100% of his meal.     Patient states he doesn't know why he is at this hospital. States he lives in [place], but that the police told him he lives in [place]. \"I don't live in [place]\". Pt is cooperative.  "

## 2022-09-24 NOTE — DISCHARGE INSTRUCTIONS
Please follow-up with the addiction services program for further help with your alcoholism.    Return to the ER for any further problems.    It is very important you take your home medications as prescribed.

## 2022-09-26 LAB
ATRIAL RATE - MUSE: 58 BPM
DIASTOLIC BLOOD PRESSURE - MUSE: NORMAL MMHG
INTERPRETATION ECG - MUSE: NORMAL
P AXIS - MUSE: 66 DEGREES
PR INTERVAL - MUSE: 158 MS
QRS DURATION - MUSE: 82 MS
QT - MUSE: 522 MS
QTC - MUSE: 512 MS
R AXIS - MUSE: -20 DEGREES
SYSTOLIC BLOOD PRESSURE - MUSE: NORMAL MMHG
T AXIS - MUSE: -8 DEGREES
VENTRICULAR RATE- MUSE: 58 BPM

## 2023-02-03 ENCOUNTER — HOSPITAL ENCOUNTER (EMERGENCY)
Facility: CLINIC | Age: 58
Discharge: LEFT BEFORE DISCHARGE | End: 2023-02-03
Payer: COMMERCIAL

## 2023-02-03 VITALS
SYSTOLIC BLOOD PRESSURE: 128 MMHG | DIASTOLIC BLOOD PRESSURE: 88 MMHG | HEART RATE: 78 BPM | RESPIRATION RATE: 16 BRPM | TEMPERATURE: 97.8 F | OXYGEN SATURATION: 96 %

## 2023-02-03 NOTE — ED TRIAGE NOTES
Pt pressed the emergency button at the bus stop and told EMS he is homeless and cold and wants to go to the ED where it is warm. Pt denies pain. Blood glucose level was 108.     Triage Assessment     Row Name 02/03/23 0326       Triage Assessment (Adult)    Airway WDL WDL       Respiratory WDL    Respiratory WDL WDL       Skin Circulation/Temperature WDL    Skin Circulation/Temperature WDL WDL       Cardiac WDL    Cardiac WDL WDL       Peripheral/Neurovascular WDL    Peripheral Neurovascular WDL WDL       Cognitive/Neuro/Behavioral WDL    Cognitive/Neuro/Behavioral WDL WDL

## (undated) RX ORDER — LIDOCAINE HYDROCHLORIDE 10 MG/ML
INJECTION, SOLUTION EPIDURAL; INFILTRATION; INTRACAUDAL; PERINEURAL
Status: DISPENSED
Start: 2021-10-13